# Patient Record
Sex: MALE | Race: BLACK OR AFRICAN AMERICAN | NOT HISPANIC OR LATINO | Employment: FULL TIME | URBAN - METROPOLITAN AREA
[De-identification: names, ages, dates, MRNs, and addresses within clinical notes are randomized per-mention and may not be internally consistent; named-entity substitution may affect disease eponyms.]

---

## 2019-03-03 ENCOUNTER — CLINICAL SUPPORT (OUTPATIENT)
Dept: URGENT CARE | Facility: CLINIC | Age: 34
End: 2019-03-03
Payer: COMMERCIAL

## 2019-03-03 VITALS
HEIGHT: 69 IN | RESPIRATION RATE: 16 BRPM | HEART RATE: 104 BPM | BODY MASS INDEX: 31.37 KG/M2 | DIASTOLIC BLOOD PRESSURE: 91 MMHG | SYSTOLIC BLOOD PRESSURE: 133 MMHG | TEMPERATURE: 99 F | WEIGHT: 211.81 LBS

## 2019-03-03 DIAGNOSIS — M54.50 LOW BACK PAIN, NON-SPECIFIC: ICD-10-CM
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
## 2019-03-03 DIAGNOSIS — M54.9 MUSCULOSKELETAL BACK PAIN: ICD-10-CM

## 2019-03-03 PROCEDURE — 72110 PR  X-RAY LUMBAR SPINE 4 VW: ICD-10-PCS | Mod: S$GLB,,, | Performed by: NURSE PRACTITIONER

## 2019-03-03 PROCEDURE — 72110 X-RAY EXAM L-2 SPINE 4/>VWS: CPT | Mod: S$GLB,,, | Performed by: NURSE PRACTITIONER

## 2019-03-03 PROCEDURE — 96372 PR INJECTION,THERAP/PROPH/DIAG2ST, IM OR SUBCUT: ICD-10-PCS | Mod: S$GLB,,, | Performed by: NURSE PRACTITIONER

## 2019-03-03 PROCEDURE — 99204 OFFICE O/P NEW MOD 45 MIN: CPT | Mod: 25,S$GLB,, | Performed by: NURSE PRACTITIONER

## 2019-03-03 PROCEDURE — 96372 THER/PROPH/DIAG INJ SC/IM: CPT | Mod: S$GLB,,, | Performed by: NURSE PRACTITIONER

## 2019-03-03 PROCEDURE — 99204 PR OFFICE/OUTPT VISIT, NEW, LEVL IV, 45-59 MIN: ICD-10-PCS | Mod: 25,S$GLB,, | Performed by: NURSE PRACTITIONER

## 2019-03-03 RX ORDER — DEXAMETHASONE SODIUM PHOSPHATE 4 MG/ML
8 INJECTION, SOLUTION INTRA-ARTICULAR; INTRALESIONAL; INTRAMUSCULAR; INTRAVENOUS; SOFT TISSUE
Status: COMPLETED | OUTPATIENT
Start: 2019-03-03 | End: 2019-03-03

## 2019-03-03 RX ORDER — METHOCARBAMOL 750 MG/1
TABLET, FILM COATED ORAL
Qty: 30 TABLET | Refills: 0 | Status: SHIPPED | OUTPATIENT
Start: 2019-03-03 | End: 2020-02-13

## 2019-03-03 RX ORDER — KETOROLAC TROMETHAMINE 30 MG/ML
30 INJECTION, SOLUTION INTRAMUSCULAR; INTRAVENOUS
Status: COMPLETED | OUTPATIENT
Start: 2019-03-03 | End: 2019-03-03

## 2019-03-03 RX ORDER — DEXTROAMPHETAMINE SACCHARATE, AMPHETAMINE ASPARTATE, DEXTROAMPHETAMINE SULFATE AND AMPHETAMINE SULFATE 3.125; 3.125; 3.125; 3.125 MG/1; MG/1; MG/1; MG/1
12.5 TABLET ORAL DAILY
COMMUNITY
End: 2020-02-13

## 2019-03-03 RX ORDER — MELOXICAM 15 MG/1
15 TABLET ORAL DAILY
Qty: 10 TABLET | Refills: 0 | Status: SHIPPED | OUTPATIENT
Start: 2019-03-03 | End: 2019-03-13

## 2019-03-03 RX ADMIN — KETOROLAC TROMETHAMINE 30 MG: 30 INJECTION, SOLUTION INTRAMUSCULAR; INTRAVENOUS at 02:03

## 2019-03-03 RX ADMIN — DEXAMETHASONE SODIUM PHOSPHATE 8 MG: 4 INJECTION, SOLUTION INTRA-ARTICULAR; INTRALESIONAL; INTRAMUSCULAR; INTRAVENOUS; SOFT TISSUE at 02:03

## 2019-03-03 NOTE — LETTER
March 3, 2019      [facility]  [address]  [place] [address]  Phone: [phone]       Patient: Owen Gonzalez   YOB: 1985  Date of Visit: 03/03/2019    To Whom It May Concern:    Tiana Gonzalez  was at [facility] on 03/03/2019. Please excuse him from work on 03/04/2019. If you have any questions or concerns, or if I can be of further assistance, please do not hesitate to contact me.    Sincerely,    [facility]

## 2019-03-03 NOTE — PATIENT INSTRUCTIONS
Self-Care for Low Back Pain    Most people have low back pain now and then. In many cases, it isnt serious and self-care can help. Sometimes low back pain can be a sign of a bigger problem. Call your healthcare provider if your pain returns often or gets worse over time. For the long-term care of your back, get regular exercise, lose any excess weight and learn good posture.  Take a short rest  Lying down during the day may be beneficial for short periods of time if severe pain increases with sitting or standing. Long-term bed rest could be detrimental.  Reduce pain and swelling  Cold reduces swelling. Both cold and heat can reduce pain. Protect your skin by placing a towel between your body and the ice or heat source.  · For the first few days, apply an ice pack for 15 to 20 minutes .  · After the first few days, try heat for 15 minutes at a time to ease pain. Never sleep on a heating pad.  · Over-the-counter medicine can help control pain and swelling. Try aspirin or ibuprofen.  Exercise  Exercise can help your back heal. It also helps your back get stronger and more flexible, preventing any reinjury. Ask your healthcare provider about specific exercises for your back.  Use good posture to avoid reinjury  · When moving, bend at the hips and knees. Dont bend at the waist or twist around.  · When lifting, keep the object close to your body. Dont try to lift more than you can handle.  · When sitting, keep your lower back supported. Use a rolled-up towel as needed.  Seek immediate medical care if:  · Youre unable to stand or walk.  · You have a temperature over 100.4°F (38.0°C)  · You have frequent, painful, or bloody urination.  · You have severe abdominal pain.  · You have a sharp, stabbing pain.  · Your pain is constant.  · You have pain or numbness in your leg.  · You feel pain in a new area of your back.  · You notice that the pain isnt decreasing after more than a week.   Date Last Reviewed: 9/29/2015  ©  2798-2146 [employer]. [address] [place] [address]. All rights reserved. This information is not intended as a substitute for professional medical care. Always follow your healthcare professional's instructions.        Relieving Back Pain  Back pain is a common problem. You can strain back muscles by lifting too much weight or just by moving the wrong way. Back strain can be uncomfortable, even painful. And it can take weeks or months to improve. To help yourself feel better and prevent future back strains, try these tips.  Important Note: Do not give aspirin to children or teens without first discussing it with your healthcare provider.      ? Ice    Ice reduces muscle pain and swelling. It helps most during the first 24 to 48 hours after an injury.  · Wrap an ice pack or a bag of frozen peas in a thin towel. (Never place ice directly on your skin.)  · Place the ice where your back hurts the most.  · Dont ice for more than 20 minutes at a time.  · You can use ice several times a day.  ? Medicines  Over-the-counter pain relievers can include acetaminophen and anti-inflammatory medicines, which includes aspirin or ibuprofen. They can help ease discomfort. Some also reduce swelling.  · Tell your healthcare provider about any medicines you are already taking.  · Take medicines only as directed.  ? Heat  After the first 48 hours, heat can relax sore muscles and improve blood flow.  · Try a warm bath or shower. Or use a heating pad set on low. To prevent a burn, keep a cloth between you and the heating pad.  · Dont use a heating pad for more than 15 minutes at a time. Never sleep on a heating pad.  Date Last Reviewed: 9/1/2015  © 3246-4769 [employer]. [address] [place] [address]. All rights reserved. This information is not intended as a substitute for professional medical care. Always follow your healthcare professional's instructions.
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
## 2020-02-13 ENCOUNTER — OFFICE VISIT (OUTPATIENT)
Dept: FAMILY MEDICINE | Facility: CLINIC | Age: 35
End: 2020-02-13
Payer: COMMERCIAL

## 2020-02-13 VITALS
HEIGHT: 69 IN | BODY MASS INDEX: 33.03 KG/M2 | HEART RATE: 84 BPM | SYSTOLIC BLOOD PRESSURE: 118 MMHG | WEIGHT: 223 LBS | DIASTOLIC BLOOD PRESSURE: 62 MMHG

## 2020-02-13 DIAGNOSIS — F90.0 ATTENTION DEFICIT HYPERACTIVITY DISORDER (ADHD), PREDOMINANTLY INATTENTIVE TYPE: ICD-10-CM

## 2020-02-13 DIAGNOSIS — Z00.00 ROUTINE PHYSICAL EXAMINATION: Primary | ICD-10-CM

## 2020-02-13 PROCEDURE — 99395 PR PREVENTIVE VISIT,EST,18-39: ICD-10-PCS | Mod: S$GLB,,, | Performed by: PHYSICIAN ASSISTANT

## 2020-02-13 PROCEDURE — 99395 PREV VISIT EST AGE 18-39: CPT | Mod: S$GLB,,, | Performed by: PHYSICIAN ASSISTANT

## 2020-02-13 RX ORDER — DEXTROAMPHETAMINE SACCHARATE, AMPHETAMINE ASPARTATE MONOHYDRATE, DEXTROAMPHETAMINE SULFATE AND AMPHETAMINE SULFATE 7.5; 7.5; 7.5; 7.5 MG/1; MG/1; MG/1; MG/1
30 CAPSULE, EXTENDED RELEASE ORAL EVERY MORNING
Qty: 30 CAPSULE | Refills: 0 | Status: SHIPPED | OUTPATIENT
Start: 2020-02-13 | End: 2020-03-13 | Stop reason: SDUPTHER

## 2020-02-13 NOTE — PROGRESS NOTES
SUBJECTIVE:    Patient ID: Owen Gonzalez is a 34 y.o. male.    Chief Complaint: Annual Exam (Wellness Visit, no meds// SW)    34-year-old black male presents today for annual wellness exam.  He reports that he has been doing pretty well. Does report some fatigue that seems to be worsening. Work as an  and busy with his hands. Has previously been on adderall for ADHD and reports that he was doing great on 30mg and 10 afternoon. Thinks that some of his anxiety and mood is related to not being as productive at work as he had been.      No visits with results within 6 Month(s) from this visit.   Latest known visit with results is:   Office Visit on 07/14/2016   Component Date Value Ref Range Status    Testosterone, Free 07/14/2016 9.4  5.1 - 41.5 pg/mL Final    Testosterone, Free 07/14/2016 9.4  5.1 - 41.5 pg/mL Final    TSH 07/14/2016 1.722  0.400 - 4.000 uIU/mL Final       History reviewed. No pertinent past medical history.  Past Surgical History:   Procedure Laterality Date    ANKLE FRACTURE SURGERY Left 2008    left ankle surgery 2008       Family History   Problem Relation Age of Onset    Diabetes Maternal Grandfather        Marital Status: Single  Alcohol History:  reports that he drinks alcohol.  Tobacco History:  reports that he has quit smoking. He quit smokeless tobacco use about 4 years ago.  Drug History:  reports that he does not use drugs.    Review of patient's allergies indicates:  No Known Allergies    Current Outpatient Medications:     dextroamphetamine-amphetamine (ADDERALL XR) 30 MG 24 hr capsule, Take 1 capsule (30 mg total) by mouth every morning., Disp: 30 capsule, Rfl: 0    Review of Systems   Constitutional: Negative for activity change, fatigue, fever and unexpected weight change.   HENT: Negative for congestion.    Respiratory: Negative for apnea, cough, chest tightness and shortness of breath.    Cardiovascular: Negative for chest pain and palpitations.  "  Gastrointestinal: Negative for abdominal distention and abdominal pain.   Genitourinary: Negative for difficulty urinating and dysuria.   Musculoskeletal: Negative for arthralgias and back pain.   Neurological: Negative for dizziness and weakness.   Psychiatric/Behavioral: Positive for decreased concentration. Negative for agitation, dysphoric mood and suicidal ideas. The patient is nervous/anxious.           Objective:      Vitals:    02/13/20 1025   BP: 118/62   Pulse: 84   Weight: 101.2 kg (223 lb)   Height: 5' 8.5" (1.74 m)     Physical Exam   Constitutional: He is oriented to person, place, and time. He appears well-developed and well-nourished. No distress.   HENT:   Head: Normocephalic and atraumatic.   Eyes: Pupils are equal, round, and reactive to light.   Neck: Normal range of motion. Neck supple. No thyromegaly present.   Cardiovascular: Normal rate, regular rhythm, normal heart sounds and intact distal pulses.   Pulmonary/Chest: Effort normal and breath sounds normal.   Abdominal: Soft. Bowel sounds are normal. He exhibits no distension. There is no tenderness.   Musculoskeletal: Normal range of motion.   Neurological: He is alert and oriented to person, place, and time. No cranial nerve deficit.   Skin: Skin is warm and dry. No rash noted. No erythema.         Assessment:       1. Routine physical examination    2. Attention deficit hyperactivity disorder (ADHD), predominantly inattentive type         Plan:       Routine physical examination  Comments:  Going to get baseline labs on patient today for ongoing patient care    Attention deficit hyperactivity disorder (ADHD), predominantly inattentive type  Comments:  I suspect that some of his anxiety is related to under treated ADHD.  Plan to start him back on Adderall 30 mg daily and see him in 30 days  Orders:  -     dextroamphetamine-amphetamine (ADDERALL XR) 30 MG 24 hr capsule; Take 1 capsule (30 mg total) by mouth every morning.  Dispense: 30 " capsule; Refill: 0  -     CBC auto differential; Future; Expected date: 02/13/2020  -     Comprehensive metabolic panel; Future; Expected date: 02/13/2020  -     Lipid panel; Future; Expected date: 02/13/2020  -     TSH w/reflex to FT4; Future; Expected date: 02/13/2020  -     Urinalysis, Reflex to Urine Culture Urine, Clean Catch; Future; Expected date: 02/13/2020      Follow up in about 4 weeks (around 3/12/2020) for adhd checkup.        2/13/2020 Johnathan Gill PA-C

## 2020-02-13 NOTE — PATIENT INSTRUCTIONS
Treating Attention Deficit/Hyperactivity Disorder (ADHD, ADD) in Adults  Attention deficit hyperactivity disorder (ADHD) starts in childhood. It may continue throughout your life. When it does, it may affect your job and even your relationships. Fortunately, with help, you can manage ADHD.     Treatment for ADD can help you achieve your goals.   Therapy  Your therapist can help you learn healthy ways to cope with ADHD. Sometimes, your partner or family may attend your sessions with you. This helps them understand more about your disorder.  Coaching  An ADHD  works with you to achieve your goals. Youll learn the best ways to manage your time. Youll also learn to avoid clutter and noise that may distract you. In time, your life will have more order and structure. And your  will provide support and feedback on your progress.  Work  Look for jobs where you can be free and creative. Avoid those that are dull or centered on details. You may still need to make a special effort. The following tips may help:  · Try to work at home, at least part-time.  · Ask for a private office.  · Use headphones to muffle noise.  · Work on more than one project at the same time. When you get bored with one, switch to the other.  · Work on boring tasks when you feel most alert.  · Have a schedule for each day.  · Ask your  or  to help with details.  · Use a day planner and to-do lists. Write yourself notes.  · Reward yourself when you finish a task.  Medicines  In some cases, medicines can help control symptoms of ADHD. Your healthcare provider may prescribe a stimulant to help you stay focused. Or you may take a type of antidepressant. It may take some time to find what works best for you. Keep in mind that medicines dont cure ADHD. And they may cause side effects such as headaches, trouble sleeping, or stomach problems. If youre bothered by side effects, be sure to tell your healthcare provider.  Changing the dose or type of medicine may help. Most often, youll use medicine along with therapy, coaching, and lifestyle changes.  Date Last Reviewed: 1/1/2017  © 9529-3884 [employer]. [address] [place] [address], [place] [address]. All rights reserved. This information is not intended as a substitute for professional medical care. Always follow your healthcare professional's instructions.

## 2020-02-15 LAB
ALBUMIN SERPL-MCNC: 4.6 G/DL (ref 3.6–5.1)
ALBUMIN/GLOB SERPL: 1.4 (CALC) (ref 1–2.5)
ALP SERPL-CCNC: 58 U/L (ref 36–130)
ALT SERPL-CCNC: 53 U/L (ref 9–46)
APPEARANCE UR: CLEAR
AST SERPL-CCNC: 68 U/L (ref 10–40)
BACTERIA #/AREA URNS HPF: ABNORMAL /HPF
BACTERIA UR CULT: ABNORMAL
BASOPHILS # BLD AUTO: 42 CELLS/UL (ref 0–200)
BASOPHILS NFR BLD AUTO: 0.6 %
BILIRUB SERPL-MCNC: 0.6 MG/DL (ref 0.2–1.2)
BILIRUB UR QL STRIP: NEGATIVE
BUN SERPL-MCNC: 17 MG/DL (ref 7–25)
BUN/CREAT SERPL: ABNORMAL (CALC) (ref 6–22)
CALCIUM SERPL-MCNC: 9.5 MG/DL (ref 8.6–10.3)
CHLORIDE SERPL-SCNC: 103 MMOL/L (ref 98–110)
CHOLEST SERPL-MCNC: 213 MG/DL
CHOLEST/HDLC SERPL: 3.3 (CALC)
CO2 SERPL-SCNC: 28 MMOL/L (ref 20–32)
COLOR UR: YELLOW
CREAT SERPL-MCNC: 0.95 MG/DL (ref 0.6–1.35)
EOSINOPHIL # BLD AUTO: 119 CELLS/UL (ref 15–500)
EOSINOPHIL NFR BLD AUTO: 1.7 %
ERYTHROCYTE [DISTWIDTH] IN BLOOD BY AUTOMATED COUNT: 11.9 % (ref 11–15)
GFRSERPLBLD MDRD-ARVRAT: 105 ML/MIN/1.73M2
GLOBULIN SER CALC-MCNC: 3.3 G/DL (CALC) (ref 1.9–3.7)
GLUCOSE SERPL-MCNC: 97 MG/DL (ref 65–99)
GLUCOSE UR QL STRIP: NEGATIVE
HCT VFR BLD AUTO: 47.5 % (ref 38.5–50)
HDLC SERPL-MCNC: 64 MG/DL
HGB BLD-MCNC: 16.2 G/DL (ref 13.2–17.1)
HGB UR QL STRIP: NEGATIVE
HYALINE CASTS #/AREA URNS LPF: ABNORMAL /LPF
KETONES UR QL STRIP: NEGATIVE
LDLC SERPL CALC-MCNC: 134 MG/DL (CALC)
LEUKOCYTE ESTERASE UR QL STRIP: NEGATIVE
LYMPHOCYTES # BLD AUTO: 1897 CELLS/UL (ref 850–3900)
LYMPHOCYTES NFR BLD AUTO: 27.1 %
MCH RBC QN AUTO: 31.9 PG (ref 27–33)
MCHC RBC AUTO-ENTMCNC: 34.1 G/DL (ref 32–36)
MCV RBC AUTO: 93.5 FL (ref 80–100)
MONOCYTES # BLD AUTO: 560 CELLS/UL (ref 200–950)
MONOCYTES NFR BLD AUTO: 8 %
NEUTROPHILS # BLD AUTO: 4382 CELLS/UL (ref 1500–7800)
NEUTROPHILS NFR BLD AUTO: 62.6 %
NITRITE UR QL STRIP: NEGATIVE
NONHDLC SERPL-MCNC: 149 MG/DL (CALC)
PH UR STRIP: 6.5 [PH] (ref 5–8)
PLATELET # BLD AUTO: 224 THOUSAND/UL (ref 140–400)
PMV BLD REES-ECKER: 10.6 FL (ref 7.5–12.5)
POTASSIUM SERPL-SCNC: 4.6 MMOL/L (ref 3.5–5.3)
PROT SERPL-MCNC: 7.9 G/DL (ref 6.1–8.1)
PROT UR QL STRIP: NEGATIVE
RBC # BLD AUTO: 5.08 MILLION/UL (ref 4.2–5.8)
RBC #/AREA URNS HPF: ABNORMAL /HPF
SODIUM SERPL-SCNC: 139 MMOL/L (ref 135–146)
SP GR UR STRIP: 1.02 (ref 1–1.03)
SQUAMOUS #/AREA URNS HPF: ABNORMAL /HPF
TRIGL SERPL-MCNC: 55 MG/DL
TSH SERPL-ACNC: 1.07 MIU/L (ref 0.4–4.5)
WBC # BLD AUTO: 7 THOUSAND/UL (ref 3.8–10.8)
WBC #/AREA URNS HPF: ABNORMAL /HPF

## 2020-03-13 ENCOUNTER — OFFICE VISIT (OUTPATIENT)
Dept: FAMILY MEDICINE | Facility: CLINIC | Age: 35
End: 2020-03-13
Payer: COMMERCIAL

## 2020-03-13 VITALS
SYSTOLIC BLOOD PRESSURE: 114 MMHG | HEART RATE: 76 BPM | BODY MASS INDEX: 32.88 KG/M2 | WEIGHT: 222 LBS | DIASTOLIC BLOOD PRESSURE: 76 MMHG | HEIGHT: 69 IN

## 2020-03-13 DIAGNOSIS — F90.0 ATTENTION DEFICIT HYPERACTIVITY DISORDER (ADHD), PREDOMINANTLY INATTENTIVE TYPE: ICD-10-CM

## 2020-03-13 PROCEDURE — 3008F PR BODY MASS INDEX (BMI) DOCUMENTED: ICD-10-PCS | Mod: S$GLB,,, | Performed by: PHYSICIAN ASSISTANT

## 2020-03-13 PROCEDURE — 3008F BODY MASS INDEX DOCD: CPT | Mod: S$GLB,,, | Performed by: PHYSICIAN ASSISTANT

## 2020-03-13 PROCEDURE — 99213 OFFICE O/P EST LOW 20 MIN: CPT | Mod: S$GLB,,, | Performed by: PHYSICIAN ASSISTANT

## 2020-03-13 PROCEDURE — 99213 PR OFFICE/OUTPT VISIT, EST, LEVL III, 20-29 MIN: ICD-10-PCS | Mod: S$GLB,,, | Performed by: PHYSICIAN ASSISTANT

## 2020-03-13 RX ORDER — DEXTROAMPHETAMINE SACCHARATE, AMPHETAMINE ASPARTATE, DEXTROAMPHETAMINE SULFATE AND AMPHETAMINE SULFATE 2.5; 2.5; 2.5; 2.5 MG/1; MG/1; MG/1; MG/1
10 TABLET ORAL DAILY
Qty: 30 TABLET | Refills: 0 | Status: SHIPPED | OUTPATIENT
Start: 2020-03-13 | End: 2020-04-12

## 2020-03-13 RX ORDER — DEXTROAMPHETAMINE SACCHARATE, AMPHETAMINE ASPARTATE, DEXTROAMPHETAMINE SULFATE AND AMPHETAMINE SULFATE 2.5; 2.5; 2.5; 2.5 MG/1; MG/1; MG/1; MG/1
10 TABLET ORAL DAILY
Qty: 30 TABLET | Refills: 0 | Status: SHIPPED | OUTPATIENT
Start: 2020-05-13 | End: 2020-04-16 | Stop reason: SDUPTHER

## 2020-03-13 RX ORDER — DEXTROAMPHETAMINE SACCHARATE, AMPHETAMINE ASPARTATE, DEXTROAMPHETAMINE SULFATE AND AMPHETAMINE SULFATE 2.5; 2.5; 2.5; 2.5 MG/1; MG/1; MG/1; MG/1
10 TABLET ORAL DAILY
Qty: 30 TABLET | Refills: 0 | Status: SHIPPED | OUTPATIENT
Start: 2020-04-13 | End: 2020-05-13

## 2020-03-13 RX ORDER — DEXTROAMPHETAMINE SACCHARATE, AMPHETAMINE ASPARTATE MONOHYDRATE, DEXTROAMPHETAMINE SULFATE AND AMPHETAMINE SULFATE 7.5; 7.5; 7.5; 7.5 MG/1; MG/1; MG/1; MG/1
30 CAPSULE, EXTENDED RELEASE ORAL EVERY MORNING
Qty: 30 CAPSULE | Refills: 0 | Status: SHIPPED | OUTPATIENT
Start: 2020-03-13 | End: 2020-04-12

## 2020-03-13 RX ORDER — DEXTROAMPHETAMINE SACCHARATE, AMPHETAMINE ASPARTATE MONOHYDRATE, DEXTROAMPHETAMINE SULFATE AND AMPHETAMINE SULFATE 7.5; 7.5; 7.5; 7.5 MG/1; MG/1; MG/1; MG/1
30 CAPSULE, EXTENDED RELEASE ORAL EVERY MORNING
Qty: 30 CAPSULE | Refills: 0 | Status: SHIPPED | OUTPATIENT
Start: 2020-05-13 | End: 2020-05-26 | Stop reason: SDUPTHER

## 2020-03-13 RX ORDER — DEXTROAMPHETAMINE SACCHARATE, AMPHETAMINE ASPARTATE MONOHYDRATE, DEXTROAMPHETAMINE SULFATE AND AMPHETAMINE SULFATE 7.5; 7.5; 7.5; 7.5 MG/1; MG/1; MG/1; MG/1
30 CAPSULE, EXTENDED RELEASE ORAL EVERY MORNING
Qty: 30 CAPSULE | Refills: 0 | Status: SHIPPED | OUTPATIENT
Start: 2020-04-13 | End: 2020-05-13

## 2020-03-13 NOTE — PROGRESS NOTES
SUBJECTIVE:    Patient ID: Owen Gonzalez is a 34 y.o. male.    Chief Complaint: Follow-up (ADHD, no bottles// SW)    34-year-old black male presents for 4 week follow-up.  I started him on Adderall XR at the last visit.  He had previously been treated in the past and did well on medication.  New job as an  and requiring lots of focus and prioritizing.  He reports that he has done well with the 30 XR.  Very minimal side effects to note. Thinks that he does notice it wearing off mid afternoon.      Office Visit on 02/13/2020   Component Date Value Ref Range Status    WBC 02/14/2020 7.0  3.8 - 10.8 Thousand/uL Final    RBC 02/14/2020 5.08  4.20 - 5.80 Million/uL Final    Hemoglobin 02/14/2020 16.2  13.2 - 17.1 g/dL Final    Hematocrit 02/14/2020 47.5  38.5 - 50.0 % Final    Mean Corpuscular Volume 02/14/2020 93.5  80.0 - 100.0 fL Final    Mean Corpuscular Hemoglobin 02/14/2020 31.9  27.0 - 33.0 pg Final    Mean Corpuscular Hemoglobin Conc 02/14/2020 34.1  32.0 - 36.0 g/dL Final    RDW 02/14/2020 11.9  11.0 - 15.0 % Final    Platelets 02/14/2020 224  140 - 400 Thousand/uL Final    MPV 02/14/2020 10.6  7.5 - 12.5 fL Final    Neutrophils Absolute 02/14/2020 4,382  1,500 - 7,800 cells/uL Final    Lymph # 02/14/2020 1,897  850 - 3,900 cells/uL Final    Mono # 02/14/2020 560  200 - 950 cells/uL Final    Eos # 02/14/2020 119  15 - 500 cells/uL Final    Baso # 02/14/2020 42  0 - 200 cells/uL Final    Neutrophils Relative 02/14/2020 62.6  % Final    Lymph% 02/14/2020 27.1  % Final    Mono% 02/14/2020 8.0  % Final    Eosinophil% 02/14/2020 1.7  % Final    Basophil% 02/14/2020 0.6  % Final    Glucose 02/14/2020 97  65 - 99 mg/dL Final    BUN, Bld 02/14/2020 17  7 - 25 mg/dL Final    Creatinine 02/14/2020 0.95  0.60 - 1.35 mg/dL Final    eGFR if non African American 02/14/2020 105  > OR = 60 mL/min/1.73m2 Final    eGFR if  02/14/2020 121  > OR = 60 mL/min/1.73m2 Final     BUN/Creatinine Ratio 02/14/2020 NOT APPLICABLE  6 - 22 (calc) Final    Sodium 02/14/2020 139  135 - 146 mmol/L Final    Potassium 02/14/2020 4.6  3.5 - 5.3 mmol/L Final    Chloride 02/14/2020 103  98 - 110 mmol/L Final    CO2 02/14/2020 28  20 - 32 mmol/L Final    Calcium 02/14/2020 9.5  8.6 - 10.3 mg/dL Final    Total Protein 02/14/2020 7.9  6.1 - 8.1 g/dL Final    Albumin 02/14/2020 4.6  3.6 - 5.1 g/dL Final    Globulin, Total 02/14/2020 3.3  1.9 - 3.7 g/dL (calc) Final    Albumin/Globulin Ratio 02/14/2020 1.4  1.0 - 2.5 (calc) Final    Total Bilirubin 02/14/2020 0.6  0.2 - 1.2 mg/dL Final    Alkaline Phosphatase 02/14/2020 58  36 - 130 U/L Final    AST 02/14/2020 68* 10 - 40 U/L Final    ALT 02/14/2020 53* 9 - 46 U/L Final    Cholesterol 02/14/2020 213* <200 mg/dL Final    HDL 02/14/2020 64  > OR = 40 mg/dL Final    Triglycerides 02/14/2020 55  <150 mg/dL Final    LDL Cholesterol 02/14/2020 134* mg/dL (calc) Final    Hdl/Cholesterol Ratio 02/14/2020 3.3  <5.0 (calc) Final    Non HDL Chol. (LDL+VLDL) 02/14/2020 149* <130 mg/dL (calc) Final    TSH w/reflex to FT4 02/14/2020 1.07  0.40 - 4.50 mIU/L Final    Color, UA 02/14/2020 YELLOW  YELLOW Final    Appearance, UA 02/14/2020 CLEAR  CLEAR Final    Specific Gravity, UA 02/14/2020 1.020  1.001 - 1.035 Final    pH, UA 02/14/2020 6.5  5.0 - 8.0 Final    Glucose, UA 02/14/2020 NEGATIVE  NEGATIVE Final    Bilirubin, UA 02/14/2020 NEGATIVE  NEGATIVE Final    Ketones, UA 02/14/2020 NEGATIVE  NEGATIVE Final    Occult Blood UA 02/14/2020 NEGATIVE  NEGATIVE Final    Protein, UA 02/14/2020 NEGATIVE  NEGATIVE Final    Nitrite, UA 02/14/2020 NEGATIVE  NEGATIVE Final    Leukocytes, UA 02/14/2020 NEGATIVE  NEGATIVE Final    WBC Casts, UA 02/14/2020 NONE SEEN  < OR = 5 /HPF Final    RBC Casts, UA 02/14/2020 NONE SEEN  < OR = 2 /HPF Final    Squam Epithel, UA 02/14/2020 NONE SEEN  < OR = 5 /HPF Final    Bacteria, UA 02/14/2020 NONE SEEN  NONE SEEN  /HPF Final    Hyaline Casts, UA 02/14/2020 0-5* NONE SEEN /LPF Final    Reflexive Urine Culture 02/14/2020 NO CULTURE INDICATED   Final       History reviewed. No pertinent past medical history.  Past Surgical History:   Procedure Laterality Date    ANKLE FRACTURE SURGERY Left 2008    left ankle surgery 2008       Family History   Problem Relation Age of Onset    Diabetes Maternal Grandfather        Marital Status: Single  Alcohol History:  reports that he drinks alcohol.  Tobacco History:  reports that he has quit smoking. He quit smokeless tobacco use about 4 years ago.  Drug History:  reports that he does not use drugs.    Review of patient's allergies indicates:  No Known Allergies    Current Outpatient Medications:     dextroamphetamine-amphetamine (ADDERALL XR) 30 MG 24 hr capsule, Take 1 capsule (30 mg total) by mouth every morning., Disp: 30 capsule, Rfl: 0    [START ON 4/13/2020] dextroamphetamine-amphetamine (ADDERALL XR) 30 MG 24 hr capsule, Take 1 capsule (30 mg total) by mouth every morning., Disp: 30 capsule, Rfl: 0    [START ON 5/13/2020] dextroamphetamine-amphetamine (ADDERALL XR) 30 MG 24 hr capsule, Take 1 capsule (30 mg total) by mouth every morning., Disp: 30 capsule, Rfl: 0    dextroamphetamine-amphetamine (ADDERALL) 10 mg Tab, Take 1 tablet (10 mg total) by mouth once daily., Disp: 30 tablet, Rfl: 0    [START ON 4/13/2020] dextroamphetamine-amphetamine (ADDERALL) 10 mg Tab, Take 1 tablet (10 mg total) by mouth once daily., Disp: 30 tablet, Rfl: 0    [START ON 5/13/2020] dextroamphetamine-amphetamine (ADDERALL) 10 mg Tab, Take 1 tablet (10 mg total) by mouth once daily., Disp: 30 tablet, Rfl: 0    Review of Systems   Constitutional: Negative for activity change, fatigue, fever and unexpected weight change.   HENT: Negative for congestion.    Respiratory: Negative for apnea, cough, chest tightness and shortness of breath.    Cardiovascular: Negative for chest pain and palpitations.  "  Gastrointestinal: Negative for abdominal distention and abdominal pain.   Genitourinary: Negative for difficulty urinating and dysuria.   Musculoskeletal: Negative for arthralgias and back pain.   Neurological: Negative for dizziness and weakness.          Objective:      Vitals:    03/13/20 0739   BP: 114/76   Pulse: 76   Weight: 100.7 kg (222 lb)   Height: 5' 8.5" (1.74 m)     Physical Exam   Constitutional: He is oriented to person, place, and time. He appears well-developed and well-nourished. No distress.   HENT:   Head: Normocephalic and atraumatic.   Eyes: Pupils are equal, round, and reactive to light.   Neck: Normal range of motion. Neck supple. No thyromegaly present.   Cardiovascular: Normal rate, regular rhythm, normal heart sounds and intact distal pulses.   Pulmonary/Chest: Effort normal and breath sounds normal.   Abdominal: Soft. Bowel sounds are normal. He exhibits no distension. There is no tenderness.   Musculoskeletal: Normal range of motion.   Neurological: He is alert and oriented to person, place, and time. No cranial nerve deficit.   Skin: Skin is warm and dry. No rash noted. No erythema.         Assessment:       1. Attention deficit hyperactivity disorder (ADHD), predominantly inattentive type         Plan:       Attention deficit hyperactivity disorder (ADHD), predominantly inattentive type  Comments:  Going to add 10mg IR in the afternoon prn on days where he has alot going on. Reevaluate in 3 mos.  Orders:  -     dextroamphetamine-amphetamine (ADDERALL XR) 30 MG 24 hr capsule; Take 1 capsule (30 mg total) by mouth every morning.  Dispense: 30 capsule; Refill: 0  -     dextroamphetamine-amphetamine (ADDERALL XR) 30 MG 24 hr capsule; Take 1 capsule (30 mg total) by mouth every morning.  Dispense: 30 capsule; Refill: 0  -     dextroamphetamine-amphetamine (ADDERALL XR) 30 MG 24 hr capsule; Take 1 capsule (30 mg total) by mouth every morning.  Dispense: 30 capsule; Refill: 0  -     " dextroamphetamine-amphetamine (ADDERALL) 10 mg Tab; Take 1 tablet (10 mg total) by mouth once daily.  Dispense: 30 tablet; Refill: 0  -     dextroamphetamine-amphetamine (ADDERALL) 10 mg Tab; Take 1 tablet (10 mg total) by mouth once daily.  Dispense: 30 tablet; Refill: 0  -     dextroamphetamine-amphetamine (ADDERALL) 10 mg Tab; Take 1 tablet (10 mg total) by mouth once daily.  Dispense: 30 tablet; Refill: 0      Follow up in about 3 months (around 6/13/2020).        3/13/2020 Johnathan Gill PA-C

## 2020-03-13 NOTE — PATIENT INSTRUCTIONS
Treating Attention Deficit/Hyperactivity Disorder (ADHD, ADD) in Adults  Attention deficit hyperactivity disorder (ADHD) starts in childhood. It may continue throughout your life. When it does, it may affect your job and even your relationships. Fortunately, with help, you can manage ADHD.     Treatment for ADD can help you achieve your goals.   Therapy  Your therapist can help you learn healthy ways to cope with ADHD. Sometimes, your partner or family may attend your sessions with you. This helps them understand more about your disorder.  Coaching  An ADHD  works with you to achieve your goals. Youll learn the best ways to manage your time. Youll also learn to avoid clutter and noise that may distract you. In time, your life will have more order and structure. And your  will provide support and feedback on your progress.  Work  Look for jobs where you can be free and creative. Avoid those that are dull or centered on details. You may still need to make a special effort. The following tips may help:  · Try to work at home, at least part-time.  · Ask for a private office.  · Use headphones to muffle noise.  · Work on more than one project at the same time. When you get bored with one, switch to the other.  · Work on boring tasks when you feel most alert.  · Have a schedule for each day.  · Ask your  or  to help with details.  · Use a day planner and to-do lists. Write yourself notes.  · Reward yourself when you finish a task.  Medicines  In some cases, medicines can help control symptoms of ADHD. Your healthcare provider may prescribe a stimulant to help you stay focused. Or you may take a type of antidepressant. It may take some time to find what works best for you. Keep in mind that medicines dont cure ADHD. And they may cause side effects such as headaches, trouble sleeping, or stomach problems. If youre bothered by side effects, be sure to tell your healthcare provider.  Changing the dose or type of medicine may help. Most often, youll use medicine along with therapy, coaching, and lifestyle changes.  Date Last Reviewed: 1/1/2017  © 9792-3865 [employer]. [address] [place] [address], [place] [address]. All rights reserved. This information is not intended as a substitute for professional medical care. Always follow your healthcare professional's instructions.

## 2020-03-23 ENCOUNTER — NURSE TRIAGE (OUTPATIENT)
Dept: ADMINISTRATIVE | Facility: CLINIC | Age: 35
End: 2020-03-23

## 2020-03-23 ENCOUNTER — HOSPITAL ENCOUNTER (EMERGENCY)
Facility: HOSPITAL | Age: 35
Discharge: HOME | End: 2020-03-23
Attending: EMERGENCY MEDICINE
Payer: COMMERCIAL

## 2020-03-23 VITALS
DIASTOLIC BLOOD PRESSURE: 68 MMHG | BODY MASS INDEX: 30.81 KG/M2 | RESPIRATION RATE: 25 BRPM | TEMPERATURE: 99 F | SYSTOLIC BLOOD PRESSURE: 114 MMHG | HEIGHT: 69 IN | OXYGEN SATURATION: 94 % | HEART RATE: 86 BPM | WEIGHT: 208 LBS

## 2020-03-23 DIAGNOSIS — J18.9 PNEUMONIA OF LEFT LOWER LOBE DUE TO INFECTIOUS ORGANISM: ICD-10-CM

## 2020-03-23 DIAGNOSIS — R50.9 COUGH WITH FEVER: ICD-10-CM

## 2020-03-23 DIAGNOSIS — R05.9 COUGH WITH FEVER: ICD-10-CM

## 2020-03-23 DIAGNOSIS — J40 BRONCHITIS: Primary | ICD-10-CM

## 2020-03-23 LAB
ALBUMIN SERPL BCP-MCNC: 4.3 G/DL (ref 3.5–5.2)
ALP SERPL-CCNC: 59 U/L (ref 55–135)
ALT SERPL W/O P-5'-P-CCNC: 29 U/L (ref 10–44)
ANION GAP SERPL CALC-SCNC: 9 MMOL/L (ref 8–16)
AST SERPL-CCNC: 30 U/L (ref 10–40)
BASOPHILS # BLD AUTO: 0.02 K/UL (ref 0–0.2)
BASOPHILS NFR BLD: 0.4 % (ref 0–1.9)
BILIRUB SERPL-MCNC: 1.2 MG/DL (ref 0.1–1)
BUN SERPL-MCNC: 18 MG/DL (ref 6–20)
CALCIUM SERPL-MCNC: 8.8 MG/DL (ref 8.7–10.5)
CHLORIDE SERPL-SCNC: 97 MMOL/L (ref 95–110)
CO2 SERPL-SCNC: 27 MMOL/L (ref 23–29)
CREAT SERPL-MCNC: 1.1 MG/DL (ref 0.5–1.4)
DIFFERENTIAL METHOD: ABNORMAL
EOSINOPHIL # BLD AUTO: 0 K/UL (ref 0–0.5)
EOSINOPHIL NFR BLD: 0 % (ref 0–8)
ERYTHROCYTE [DISTWIDTH] IN BLOOD BY AUTOMATED COUNT: 11.5 % (ref 11.5–14.5)
EST. GFR  (AFRICAN AMERICAN): >60 ML/MIN/1.73 M^2
EST. GFR  (NON AFRICAN AMERICAN): >60 ML/MIN/1.73 M^2
GLUCOSE SERPL-MCNC: 91 MG/DL (ref 70–110)
HCT VFR BLD AUTO: 46.9 % (ref 40–54)
HGB BLD-MCNC: 15.5 G/DL (ref 14–18)
IMM GRANULOCYTES # BLD AUTO: 0.04 K/UL (ref 0–0.04)
IMM GRANULOCYTES NFR BLD AUTO: 0.7 % (ref 0–0.5)
INFLUENZA A, MOLECULAR: NEGATIVE
INFLUENZA B, MOLECULAR: NEGATIVE
LACTATE SERPL-SCNC: 1 MMOL/L (ref 0.5–1.9)
LYMPHOCYTES # BLD AUTO: 1.4 K/UL (ref 1–4.8)
LYMPHOCYTES NFR BLD: 26.5 % (ref 18–48)
MCH RBC QN AUTO: 31.4 PG (ref 27–31)
MCHC RBC AUTO-ENTMCNC: 33 G/DL (ref 32–36)
MCV RBC AUTO: 95 FL (ref 82–98)
MONOCYTES # BLD AUTO: 0.4 K/UL (ref 0.3–1)
MONOCYTES NFR BLD: 6.8 % (ref 4–15)
NEUTROPHILS # BLD AUTO: 3.6 K/UL (ref 1.8–7.7)
NEUTROPHILS NFR BLD: 65.6 % (ref 38–73)
NRBC BLD-RTO: 0 /100 WBC
PLATELET # BLD AUTO: 130 K/UL (ref 150–350)
PMV BLD AUTO: 10.2 FL (ref 9.2–12.9)
POTASSIUM SERPL-SCNC: 4.1 MMOL/L (ref 3.5–5.1)
PROCALCITONIN SERPL IA-MCNC: <0.05 NG/ML (ref 0–0.5)
PROT SERPL-MCNC: 8.3 G/DL (ref 6–8.4)
RBC # BLD AUTO: 4.94 M/UL (ref 4.6–6.2)
SODIUM SERPL-SCNC: 133 MMOL/L (ref 136–145)
SPECIMEN SOURCE: NORMAL
WBC # BLD AUTO: 5.43 K/UL (ref 3.9–12.7)

## 2020-03-23 PROCEDURE — U0002 COVID-19 LAB TEST NON-CDC: HCPCS

## 2020-03-23 PROCEDURE — 96366 THER/PROPH/DIAG IV INF ADDON: CPT

## 2020-03-23 PROCEDURE — 25000003 PHARM REV CODE 250: Performed by: NURSE PRACTITIONER

## 2020-03-23 PROCEDURE — 83605 ASSAY OF LACTIC ACID: CPT

## 2020-03-23 PROCEDURE — 85025 COMPLETE CBC W/AUTO DIFF WBC: CPT

## 2020-03-23 PROCEDURE — 87502 INFLUENZA DNA AMP PROBE: CPT

## 2020-03-23 PROCEDURE — 96365 THER/PROPH/DIAG IV INF INIT: CPT

## 2020-03-23 PROCEDURE — 63600175 PHARM REV CODE 636 W HCPCS: Performed by: NURSE PRACTITIONER

## 2020-03-23 PROCEDURE — 87040 BLOOD CULTURE FOR BACTERIA: CPT | Mod: 59

## 2020-03-23 PROCEDURE — 99284 EMERGENCY DEPT VISIT MOD MDM: CPT | Mod: 25

## 2020-03-23 PROCEDURE — 80053 COMPREHEN METABOLIC PANEL: CPT

## 2020-03-23 PROCEDURE — 87081 CULTURE SCREEN ONLY: CPT

## 2020-03-23 PROCEDURE — 84145 PROCALCITONIN (PCT): CPT

## 2020-03-23 RX ORDER — ACETAMINOPHEN 325 MG/1
650 TABLET ORAL
Status: COMPLETED | OUTPATIENT
Start: 2020-03-23 | End: 2020-03-23

## 2020-03-23 RX ORDER — ALBUTEROL SULFATE 90 UG/1
1-2 AEROSOL, METERED RESPIRATORY (INHALATION) EVERY 6 HOURS PRN
Qty: 1 G | Refills: 0 | Status: SHIPPED | OUTPATIENT
Start: 2020-03-23 | End: 2020-09-02

## 2020-03-23 RX ORDER — AZITHROMYCIN 250 MG/1
250 TABLET, FILM COATED ORAL DAILY
Qty: 6 TABLET | Refills: 0 | Status: SHIPPED | OUTPATIENT
Start: 2020-03-23 | End: 2020-06-19

## 2020-03-23 RX ORDER — AZITHROMYCIN 250 MG/1
250 TABLET, FILM COATED ORAL DAILY
Qty: 6 TABLET | Refills: 0 | Status: SHIPPED | OUTPATIENT
Start: 2020-03-23 | End: 2020-03-23 | Stop reason: SDUPTHER

## 2020-03-23 RX ORDER — ALBUTEROL SULFATE 90 UG/1
1-2 AEROSOL, METERED RESPIRATORY (INHALATION) EVERY 6 HOURS PRN
Qty: 1 G | Refills: 0 | Status: SHIPPED | OUTPATIENT
Start: 2020-03-23 | End: 2020-03-23 | Stop reason: SDUPTHER

## 2020-03-23 RX ORDER — BENZONATATE 100 MG/1
100 CAPSULE ORAL 3 TIMES DAILY PRN
Qty: 30 CAPSULE | Refills: 0 | Status: SHIPPED | OUTPATIENT
Start: 2020-03-23 | End: 2020-06-19

## 2020-03-23 RX ORDER — BENZONATATE 100 MG/1
100 CAPSULE ORAL 3 TIMES DAILY PRN
Qty: 30 CAPSULE | Refills: 0 | Status: SHIPPED | OUTPATIENT
Start: 2020-03-23 | End: 2020-03-23 | Stop reason: SDUPTHER

## 2020-03-23 RX ADMIN — CEFTRIAXONE 1 G: 1 INJECTION, SOLUTION INTRAVENOUS at 06:03

## 2020-03-23 RX ADMIN — SODIUM CHLORIDE 1000 ML: 0.9 INJECTION, SOLUTION INTRAVENOUS at 06:03

## 2020-03-23 RX ADMIN — ACETAMINOPHEN 650 MG: 325 TABLET ORAL at 03:03

## 2020-03-23 NOTE — DISCHARGE INSTRUCTIONS
At this time you must quarantine at home until you have   3 days without fever  2.   Must have not taken any fever reducing medications   3.  Other symptoms such as cough should be improving.  4.  Must have been at least 7 days since first symptom.    At this time it is important to self quarantine at home and to call the [facility] at [phone] or text JAQUELINED to [phone] for further advice on when quarantine may be lifted.  You may talk to a doctor by virtual visit by going to [email or web address] or [email or web address] or call your primary care doctor for further advice.  You should return to the ER immediately if you have difficulty breathing, have any worsening symptoms or any concerns. You may call [phone] for 24/7 Covid-19 information.    Places for Testing         [facility] (for [facility] and [facility] patients only)                   [facility] [address] [place] [address]                             [facility] [place] [address], [address]  [place] [address]    [place] [facility]  [address] [place] [address]          [facility] [address]        [address]         [place], [address]          [facility] [address] [place] [address]          [facility] [address]        [address] [place], [place] [facility] [place]  [address]  [place] [address]

## 2020-03-23 NOTE — TELEPHONE ENCOUNTER
Owen Gonzalez is a 34 y.o. Male c/o cough, muscle aches for 2 days. Patient denies fever. 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     Plan:   - [facility] appt today  - Call back if sx worsen or further questions     Reason for Disposition   Cough occurs and onset > 14 days after COVID-19 EXPOSURE   Cough occurs within 14 days of COVID-19 EXPOSURE    Additional Information   Negative: Severe difficulty breathing (e.g., struggling for each breath, speak in single words, bluish lips)   Negative: Sounds like a life-threatening emergency to the triager   Negative: Difficulty breathing occurs within 14 days of COVID-19 EXPOSURE   Negative: Patient sounds very sick or weak to the triager   Negative: Difficulty breathing (shortness of breath) occurs and onset > 14 days after COVID-19 EXPOSURE (Close Contact)   Negative: Common cold symptoms and onset > 14 days after COVID-19 EXPOSURE   Negative: Fever or feeling feverish within 14 Days of COVID-19 EXPOSURE    Protocols used: CORONAVIRUS (COVID-19) EXPOSURE-A-OH

## 2020-03-23 NOTE — ED PROVIDER NOTES
Encounter Date: 3/23/2020       History     Chief Complaint   Patient presents with    Fever    Cough     Presents with complaint of temp hightest 100.3 and cough  Onset 3 days        Review of patient's allergies indicates:  No Known Allergies  No past medical history on file.  Past Surgical History:   Procedure Laterality Date    ANKLE FRACTURE SURGERY Left 2008    left ankle surgery 2008       Family History   Problem Relation Age of Onset    Diabetes Maternal Grandfather      Social History     Tobacco Use    Smoking status: Former Smoker    Smokeless tobacco: Former User     Quit date: 10/1/2015   Substance Use Topics    Alcohol use: Yes     Comment: socially    Drug use: No     Review of Systems   Constitutional: Positive for fever.   Respiratory: Positive for cough. Negative for shortness of breath and wheezing.    Cardiovascular: Negative for chest pain, palpitations and leg swelling.   Gastrointestinal: Negative for abdominal pain, diarrhea, nausea and vomiting.   Musculoskeletal: Negative for back pain.   Skin: Negative for rash.   Neurological: Negative for weakness.       Physical Exam     Initial Vitals [03/23/20 1358]   BP Pulse Resp Temp SpO2   126/76 102 20 99.9 °F (37.7 °C) 95 %      MAP       --         Physical Exam    Constitutional: He appears well-developed and well-nourished.   HENT:   Mouth/Throat: Oropharynx is clear and moist.   Eyes: Conjunctivae are normal.   Neck: Normal range of motion. Neck supple.   Cardiovascular: Normal rate and regular rhythm.   Pulmonary/Chest: Breath sounds normal. No respiratory distress. He has no wheezes. He has no rhonchi.   Abdominal: Soft. Bowel sounds are normal.   Musculoskeletal: Normal range of motion.   Neurological: He is alert and oriented to person, place, and time. No sensory deficit. GCS score is 15. GCS eye subscore is 4. GCS verbal subscore is 5. GCS motor subscore is 6.   Skin: Skin is warm. Capillary refill takes less than 2 seconds.    Psychiatric: He has a normal mood and affect. Thought content normal.         ED Course   Procedures  Labs Reviewed   THROAT SCREEN, RAPID   SARS-COV-2 (COVID-19) QUALITATIVE PCR   INFLUENZA A AND B ANTIGEN          Imaging Results    None          Medical Decision Making:   Initial Assessment:   Cough and fever for 3 days  ED Management:  Have discussed with this pt the possibility of having COVID 19 virus  He has received both written and verbal instructions regarding quarantine  He has verbalized understanding of his instruction  He is in no acute distress and is of low risk of having COVID 19   Pt was brought in from the car line He HR is elevated and temp barely decreased after dose of tylenol      Have discussed this pt with DR Longoria                                 Clinical Impression:       ICD-10-CM ICD-9-CM   1. Bronchitis J40 490                                Maddison Tim NP  03/23/20 1521       Maddison Tim NP  03/23/20 2008

## 2020-03-26 LAB — BACTERIA THROAT CULT: NORMAL

## 2020-03-28 LAB
BACTERIA BLD CULT: NORMAL
BACTERIA BLD CULT: NORMAL
SARS-COV-2 RNA RESP QL NAA+PROBE: NOT DETECTED

## 2020-03-29 ENCOUNTER — TELEPHONE (OUTPATIENT)
Dept: ADMINISTRATIVE | Facility: HOSPITAL | Age: 35
End: 2020-03-29

## 2020-03-29 NOTE — TELEPHONE ENCOUNTER
Instructed patient of NEGATIVE COVID-19 (coronavirus) test result. Further instructed patient, if asymptomatic, self quarantine may be discontinued, however it is recommended to stay home, avoid large crowds and practice proper handwashing. If still symptomatic, instructed patient they may have another respiratory pathogen that is circulating in the community, to avoid work, school and group settings until TWO days following the last day of respiratory symptoms/fever. Instructed to follow up with primary care physician or return to ER if symptoms worsen. Obtained home e-mail address to send a copy of printed instructions. This result was communicated to the patient by Arianne Reyes RN on 03/29/2020.

## 2020-04-16 DIAGNOSIS — F90.0 ATTENTION DEFICIT HYPERACTIVITY DISORDER (ADHD), PREDOMINANTLY INATTENTIVE TYPE: ICD-10-CM

## 2020-04-16 RX ORDER — DEXTROAMPHETAMINE SACCHARATE, AMPHETAMINE ASPARTATE, DEXTROAMPHETAMINE SULFATE AND AMPHETAMINE SULFATE 2.5; 2.5; 2.5; 2.5 MG/1; MG/1; MG/1; MG/1
10 TABLET ORAL DAILY
Qty: 30 TABLET | Refills: 0 | Status: SHIPPED | OUTPATIENT
Start: 2020-05-13 | End: 2020-05-26 | Stop reason: SDUPTHER

## 2020-04-16 NOTE — TELEPHONE ENCOUNTER
----- Message from Kirstin Acuña sent at 4/16/2020  8:55 AM CDT -----  Refill for Asderall. [facility] [address]. Pt #[phone]

## 2020-05-26 DIAGNOSIS — F90.0 ATTENTION DEFICIT HYPERACTIVITY DISORDER (ADHD), PREDOMINANTLY INATTENTIVE TYPE: ICD-10-CM

## 2020-05-26 RX ORDER — DEXTROAMPHETAMINE SACCHARATE, AMPHETAMINE ASPARTATE, DEXTROAMPHETAMINE SULFATE AND AMPHETAMINE SULFATE 2.5; 2.5; 2.5; 2.5 MG/1; MG/1; MG/1; MG/1
10 TABLET ORAL DAILY
Qty: 30 TABLET | Refills: 0 | Status: SHIPPED | OUTPATIENT
Start: 2020-05-26 | End: 2020-06-19 | Stop reason: SDUPTHER

## 2020-05-26 RX ORDER — DEXTROAMPHETAMINE SACCHARATE, AMPHETAMINE ASPARTATE MONOHYDRATE, DEXTROAMPHETAMINE SULFATE AND AMPHETAMINE SULFATE 7.5; 7.5; 7.5; 7.5 MG/1; MG/1; MG/1; MG/1
30 CAPSULE, EXTENDED RELEASE ORAL EVERY MORNING
Qty: 30 CAPSULE | Refills: 0 | Status: SHIPPED | OUTPATIENT
Start: 2020-05-26 | End: 2020-06-19 | Stop reason: SDUPTHER

## 2020-05-26 NOTE — TELEPHONE ENCOUNTER
----- Message from Jennie Fish sent at 5/26/2020  1:06 PM CDT -----  Contact: Owen BRANTLEY  1:00  Refill Adderall 10 mg and Adderall  30 mg. [facility] [address]. pts # [phone] GH

## 2020-06-19 ENCOUNTER — OFFICE VISIT (OUTPATIENT)
Dept: FAMILY MEDICINE | Facility: CLINIC | Age: 35
End: 2020-06-19
Payer: COMMERCIAL

## 2020-06-19 VITALS
DIASTOLIC BLOOD PRESSURE: 84 MMHG | SYSTOLIC BLOOD PRESSURE: 118 MMHG | HEART RATE: 72 BPM | WEIGHT: 216 LBS | HEIGHT: 69 IN | BODY MASS INDEX: 31.99 KG/M2 | TEMPERATURE: 98 F

## 2020-06-19 DIAGNOSIS — F90.0 ATTENTION DEFICIT HYPERACTIVITY DISORDER (ADHD), PREDOMINANTLY INATTENTIVE TYPE: ICD-10-CM

## 2020-06-19 DIAGNOSIS — Z20.822 CLOSE EXPOSURE TO COVID-19 VIRUS: Primary | ICD-10-CM

## 2020-06-19 PROCEDURE — 3008F PR BODY MASS INDEX (BMI) DOCUMENTED: ICD-10-PCS | Mod: S$GLB,,, | Performed by: PHYSICIAN ASSISTANT

## 2020-06-19 PROCEDURE — 99213 PR OFFICE/OUTPT VISIT, EST, LEVL III, 20-29 MIN: ICD-10-PCS | Mod: S$GLB,,, | Performed by: PHYSICIAN ASSISTANT

## 2020-06-19 PROCEDURE — 99213 OFFICE O/P EST LOW 20 MIN: CPT | Mod: S$GLB,,, | Performed by: PHYSICIAN ASSISTANT

## 2020-06-19 PROCEDURE — 3008F BODY MASS INDEX DOCD: CPT | Mod: S$GLB,,, | Performed by: PHYSICIAN ASSISTANT

## 2020-06-19 RX ORDER — DEXTROAMPHETAMINE SACCHARATE, AMPHETAMINE ASPARTATE, DEXTROAMPHETAMINE SULFATE AND AMPHETAMINE SULFATE 2.5; 2.5; 2.5; 2.5 MG/1; MG/1; MG/1; MG/1
10 TABLET ORAL DAILY
Qty: 30 TABLET | Refills: 0 | Status: SHIPPED | OUTPATIENT
Start: 2020-07-26 | End: 2020-09-02

## 2020-06-19 RX ORDER — DEXTROAMPHETAMINE SACCHARATE, AMPHETAMINE ASPARTATE MONOHYDRATE, DEXTROAMPHETAMINE SULFATE AND AMPHETAMINE SULFATE 7.5; 7.5; 7.5; 7.5 MG/1; MG/1; MG/1; MG/1
30 CAPSULE, EXTENDED RELEASE ORAL EVERY MORNING
Qty: 30 CAPSULE | Refills: 0 | Status: SHIPPED | OUTPATIENT
Start: 2020-07-26 | End: 2020-08-25

## 2020-06-19 RX ORDER — DEXTROAMPHETAMINE SACCHARATE, AMPHETAMINE ASPARTATE, DEXTROAMPHETAMINE SULFATE AND AMPHETAMINE SULFATE 2.5; 2.5; 2.5; 2.5 MG/1; MG/1; MG/1; MG/1
10 TABLET ORAL DAILY
Qty: 30 TABLET | Refills: 0 | Status: ON HOLD | OUTPATIENT
Start: 2020-08-26 | End: 2020-08-18 | Stop reason: HOSPADM

## 2020-06-19 RX ORDER — DEXTROAMPHETAMINE SACCHARATE, AMPHETAMINE ASPARTATE MONOHYDRATE, DEXTROAMPHETAMINE SULFATE AND AMPHETAMINE SULFATE 7.5; 7.5; 7.5; 7.5 MG/1; MG/1; MG/1; MG/1
30 CAPSULE, EXTENDED RELEASE ORAL EVERY MORNING
Qty: 30 CAPSULE | Refills: 0 | Status: SHIPPED | OUTPATIENT
Start: 2020-06-26 | End: 2020-07-26

## 2020-06-19 RX ORDER — DEXTROAMPHETAMINE SACCHARATE, AMPHETAMINE ASPARTATE MONOHYDRATE, DEXTROAMPHETAMINE SULFATE AND AMPHETAMINE SULFATE 7.5; 7.5; 7.5; 7.5 MG/1; MG/1; MG/1; MG/1
30 CAPSULE, EXTENDED RELEASE ORAL EVERY MORNING
Qty: 30 CAPSULE | Refills: 0 | Status: ON HOLD | OUTPATIENT
Start: 2020-08-26 | End: 2020-08-18 | Stop reason: HOSPADM

## 2020-06-19 RX ORDER — DEXTROAMPHETAMINE SACCHARATE, AMPHETAMINE ASPARTATE, DEXTROAMPHETAMINE SULFATE AND AMPHETAMINE SULFATE 2.5; 2.5; 2.5; 2.5 MG/1; MG/1; MG/1; MG/1
10 TABLET ORAL DAILY
Qty: 30 TABLET | Refills: 0 | Status: SHIPPED | OUTPATIENT
Start: 2020-06-26 | End: 2020-07-26

## 2020-06-19 NOTE — PATIENT INSTRUCTIONS
Attention Deficit/Hyperactivity Disorder (ADHD, ADD) in Adults  Youve always had trouble concentrating. Your mind wanders, and its hard to finish tasks. As a result, you didnt do well in school. And now, you often struggle with your job. Sometimes this makes you clark or depressed. These may be symptoms of attention deficit  hyperactivity disorder (ADHD) or may still be referred to as attention deficit disorder (ADD). To find out more, talk to your healthcare provider. He or she can offer guidance and support.  Symptoms  of ADD in adults  For an adult to be diagnosed with ADHD, the symptoms must have been present since childhood. The symptoms may include:  · Trouble thinking things through  · Low self-esteem  · Depression  · Trouble holding a job  · Memory problems  · Problems with a marriage or relationship  · Lack of discipline   What is ADHD?  Attention deficit hyperactivity disorder makes it hard to focus your mind. You may daydream a lot. And you may be restless much of the time. As a result, you may have trouble with detailed or boring work. And it may be hard to stick with one project for very long. You also may forget things. Or, you may miss key points during a lecture or meeting. You may even have trouble sitting through a movie or concert. At times, you may feel frustrated or angry. This can affect your relationships with others.  Who does it affect?  ADHD starts in childhood. Sometimes, your symptoms may improve as you get older. But they also may persist into your adult years. ADHD is often thought of as a kids problem. Thats why its often missed in adults. In fact, many parents learn they have ADHD when their children are diagnosed.  What causes it?  The exact cause of ADHD isnt known. The disorder does run in families. Having one parent with ADHD makes it more likely youll have it too. And the part of your brain that controls attention may be involved. Certain brain chemicals that are out  of balance may also play a role.  What can be done?  The first step is finding out if you really have ADHD. Your doctor will use special guidelines to diagnose the disorder. Most adults with ADHD are greatly helped by therapy and coaching. In some cases, your doctor may also prescribe medicine to ease your symptoms.  Resources  · National Resource Center on AD/HDwww.vlup9nuzp.org  · Attention Deficit Disorder Associationwww.add.org   Date Last Reviewed: 1/1/2017 © 2000-2017 [employer]. [address] [place] [address], [place] [address]. All rights reserved. This information is not intended as a substitute for professional medical care. Always follow your healthcare professional's instructions.

## 2020-06-19 NOTE — PROGRESS NOTES
SUBJECTIVE:    Patient ID: Owen Gonzalez is a 34 y.o. male.    Chief Complaint: Follow-up (ADHD, no bottles, went over meds verbally// SW)    34-year-old male presents today for regular ADHD checkup. He reports that he has been doing pretty well overall. Medication doses are effective at the current dose. Denies any SEs. Sleeps well. Occasionally does get a tinnitus and ringing in his ears. Did have his GF that tested positive for COVID-19 in march. Nurse at [facility]. He deveoped all the sxs but was negative. Interested in getting the antibody test done.      Admission on 03/23/2020, Discharged on 03/23/2020   Component Date Value Ref Range Status    SARS-CoV2 (COVID-19) Qualitative P* 03/23/2020 Not Detected  Not Detected Final    Influenza A, Molecular 03/23/2020 Negative  Negative Final    Influenza B, Molecular 03/23/2020 Negative  Negative Final    Flu A & B Source 03/23/2020 Nasal swab   Final    Strep A Culture 03/23/2020 No  Group A  Streptococcus isolated   Final    WBC 03/23/2020 5.43  3.90 - 12.70 K/uL Final    RBC 03/23/2020 4.94  4.60 - 6.20 M/uL Final    Hemoglobin 03/23/2020 15.5  14.0 - 18.0 g/dL Final    Hematocrit 03/23/2020 46.9  40.0 - 54.0 % Final    Mean Corpuscular Volume 03/23/2020 95  82 - 98 fL Final    Mean Corpuscular Hemoglobin 03/23/2020 31.4* 27.0 - 31.0 pg Final    Mean Corpuscular Hemoglobin Conc 03/23/2020 33.0  32.0 - 36.0 g/dL Final    RDW 03/23/2020 11.5  11.5 - 14.5 % Final    Platelets 03/23/2020 130* 150 - 350 K/uL Final    MPV 03/23/2020 10.2  9.2 - 12.9 fL Final    Immature Granulocytes 03/23/2020 0.7* 0.0 - 0.5 % Final    Gran # (ANC) 03/23/2020 3.6  1.8 - 7.7 K/uL Final    Immature Grans (Abs) 03/23/2020 0.04  0.00 - 0.04 K/uL Final    Lymph # 03/23/2020 1.4  1.0 - 4.8 K/uL Final    Mono # 03/23/2020 0.4  0.3 - 1.0 K/uL Final    Eos # 03/23/2020 0.0  0.0 - 0.5 K/uL Final    Baso # 03/23/2020 0.02  0.00 - 0.20 K/uL Final    nRBC  03/23/2020 0  0 /100 WBC Final    Gran% 03/23/2020 65.6  38.0 - 73.0 % Final    Lymph% 03/23/2020 26.5  18.0 - 48.0 % Final    Mono% 03/23/2020 6.8  4.0 - 15.0 % Final    Eosinophil% 03/23/2020 0.0  0.0 - 8.0 % Final    Basophil% 03/23/2020 0.4  0.0 - 1.9 % Final    Differential Method 03/23/2020 Automated   Final    Sodium 03/23/2020 133* 136 - 145 mmol/L Final    Potassium 03/23/2020 4.1  3.5 - 5.1 mmol/L Final    Chloride 03/23/2020 97  95 - 110 mmol/L Final    CO2 03/23/2020 27  23 - 29 mmol/L Final    Glucose 03/23/2020 91  70 - 110 mg/dL Final    BUN, Bld 03/23/2020 18  6 - 20 mg/dL Final    Creatinine 03/23/2020 1.1  0.5 - 1.4 mg/dL Final    Calcium 03/23/2020 8.8  8.7 - 10.5 mg/dL Final    Total Protein 03/23/2020 8.3  6.0 - 8.4 g/dL Final    Albumin 03/23/2020 4.3  3.5 - 5.2 g/dL Final    Total Bilirubin 03/23/2020 1.2* 0.1 - 1.0 mg/dL Final    Alkaline Phosphatase 03/23/2020 59  55 - 135 U/L Final    AST 03/23/2020 30  10 - 40 U/L Final    ALT 03/23/2020 29  10 - 44 U/L Final    Anion Gap 03/23/2020 9  8 - 16 mmol/L Final    eGFR if African American 03/23/2020 >60.0  >60 mL/min/1.73 m^2 Final    eGFR if non African American 03/23/2020 >60.0  >60 mL/min/1.73 m^2 Final    Blood Culture, Routine 03/23/2020 No growth after 5 days.   Final    Blood Culture, Routine 03/23/2020 No growth after 5 days.   Final    Procalcitonin 03/23/2020 <0.05  0.00 - 0.50 ng/mL Final    Lactate (Lactic Acid) 03/23/2020 1.0  0.5 - 1.9 mmol/L Final   Office Visit on 02/13/2020   Component Date Value Ref Range Status    WBC 02/14/2020 7.0  3.8 - 10.8 Thousand/uL Final    RBC 02/14/2020 5.08  4.20 - 5.80 Million/uL Final    Hemoglobin 02/14/2020 16.2  13.2 - 17.1 g/dL Final    Hematocrit 02/14/2020 47.5  38.5 - 50.0 % Final    Mean Corpuscular Volume 02/14/2020 93.5  80.0 - 100.0 fL Final    Mean Corpuscular Hemoglobin 02/14/2020 31.9  27.0 - 33.0 pg Final    Mean Corpuscular Hemoglobin Conc  02/14/2020 34.1  32.0 - 36.0 g/dL Final    RDW 02/14/2020 11.9  11.0 - 15.0 % Final    Platelets 02/14/2020 224  140 - 400 Thousand/uL Final    MPV 02/14/2020 10.6  7.5 - 12.5 fL Final    Neutrophils Absolute 02/14/2020 4,382  1,500 - 7,800 cells/uL Final    Lymph # 02/14/2020 1,897  850 - 3,900 cells/uL Final    Mono # 02/14/2020 560  200 - 950 cells/uL Final    Eos # 02/14/2020 119  15 - 500 cells/uL Final    Baso # 02/14/2020 42  0 - 200 cells/uL Final    Neutrophils Relative 02/14/2020 62.6  % Final    Lymph% 02/14/2020 27.1  % Final    Mono% 02/14/2020 8.0  % Final    Eosinophil% 02/14/2020 1.7  % Final    Basophil% 02/14/2020 0.6  % Final    Glucose 02/14/2020 97  65 - 99 mg/dL Final    BUN, Bld 02/14/2020 17  7 - 25 mg/dL Final    Creatinine 02/14/2020 0.95  0.60 - 1.35 mg/dL Final    eGFR if non African American 02/14/2020 105  > OR = 60 mL/min/1.73m2 Final    eGFR if  02/14/2020 121  > OR = 60 mL/min/1.73m2 Final    BUN/Creatinine Ratio 02/14/2020 NOT APPLICABLE  6 - 22 (calc) Final    Sodium 02/14/2020 139  135 - 146 mmol/L Final    Potassium 02/14/2020 4.6  3.5 - 5.3 mmol/L Final    Chloride 02/14/2020 103  98 - 110 mmol/L Final    CO2 02/14/2020 28  20 - 32 mmol/L Final    Calcium 02/14/2020 9.5  8.6 - 10.3 mg/dL Final    Total Protein 02/14/2020 7.9  6.1 - 8.1 g/dL Final    Albumin 02/14/2020 4.6  3.6 - 5.1 g/dL Final    Globulin, Total 02/14/2020 3.3  1.9 - 3.7 g/dL (calc) Final    Albumin/Globulin Ratio 02/14/2020 1.4  1.0 - 2.5 (calc) Final    Total Bilirubin 02/14/2020 0.6  0.2 - 1.2 mg/dL Final    Alkaline Phosphatase 02/14/2020 58  36 - 130 U/L Final    AST 02/14/2020 68* 10 - 40 U/L Final    ALT 02/14/2020 53* 9 - 46 U/L Final    Cholesterol 02/14/2020 213* <200 mg/dL Final    HDL 02/14/2020 64  > OR = 40 mg/dL Final    Triglycerides 02/14/2020 55  <150 mg/dL Final    LDL Cholesterol 02/14/2020 134* mg/dL (calc) Final    Hdl/Cholesterol Ratio  02/14/2020 3.3  <5.0 (calc) Final    Non HDL Chol. (LDL+VLDL) 02/14/2020 149* <130 mg/dL (calc) Final    TSH w/reflex to FT4 02/14/2020 1.07  0.40 - 4.50 mIU/L Final    Color, UA 02/14/2020 YELLOW  YELLOW Final    Appearance, UA 02/14/2020 CLEAR  CLEAR Final    Specific Gravity, UA 02/14/2020 1.020  1.001 - 1.035 Final    pH, UA 02/14/2020 6.5  5.0 - 8.0 Final    Glucose, UA 02/14/2020 NEGATIVE  NEGATIVE Final    Bilirubin, UA 02/14/2020 NEGATIVE  NEGATIVE Final    Ketones, UA 02/14/2020 NEGATIVE  NEGATIVE Final    Occult Blood UA 02/14/2020 NEGATIVE  NEGATIVE Final    Protein, UA 02/14/2020 NEGATIVE  NEGATIVE Final    Nitrite, UA 02/14/2020 NEGATIVE  NEGATIVE Final    Leukocytes, UA 02/14/2020 NEGATIVE  NEGATIVE Final    WBC Casts, UA 02/14/2020 NONE SEEN  < OR = 5 /HPF Final    RBC Casts, UA 02/14/2020 NONE SEEN  < OR = 2 /HPF Final    Squam Epithel, UA 02/14/2020 NONE SEEN  < OR = 5 /HPF Final    Bacteria, UA 02/14/2020 NONE SEEN  NONE SEEN /HPF Final    Hyaline Casts, UA 02/14/2020 0-5* NONE SEEN /LPF Final    Reflexive Urine Culture 02/14/2020 NO CULTURE INDICATED   Final       History reviewed. No pertinent past medical history.  Past Surgical History:   Procedure Laterality Date    ANKLE FRACTURE SURGERY Left 2008    left ankle surgery 2008       Family History   Problem Relation Age of Onset    Diabetes Maternal Grandfather        Marital Status: Single  Alcohol History:  reports current alcohol use.  Tobacco History:  reports that he has quit smoking. He quit smokeless tobacco use about 4 years ago.  Drug History:  reports no history of drug use.    Review of patient's allergies indicates:  No Known Allergies    Current Outpatient Medications:     albuterol (PROVENTIL/VENTOLIN HFA) 90 mcg/actuation inhaler, Inhale 1-2 puffs into the lungs every 6 (six) hours as needed for Wheezing. Rescue, Disp: 1 g, Rfl: 0    [START ON 6/26/2020] dextroamphetamine-amphetamine (ADDERALL XR) 30 MG 24  "hr capsule, Take 1 capsule (30 mg total) by mouth every morning., Disp: 30 capsule, Rfl: 0    [START ON 7/26/2020] dextroamphetamine-amphetamine (ADDERALL XR) 30 MG 24 hr capsule, Take 1 capsule (30 mg total) by mouth every morning., Disp: 30 capsule, Rfl: 0    [START ON 8/26/2020] dextroamphetamine-amphetamine (ADDERALL XR) 30 MG 24 hr capsule, Take 1 capsule (30 mg total) by mouth every morning., Disp: 30 capsule, Rfl: 0    [START ON 6/26/2020] dextroamphetamine-amphetamine (ADDERALL) 10 mg Tab, Take 1 tablet (10 mg total) by mouth once daily., Disp: 30 tablet, Rfl: 0    [START ON 7/26/2020] dextroamphetamine-amphetamine (ADDERALL) 10 mg Tab, Take 1 tablet (10 mg total) by mouth once daily., Disp: 30 tablet, Rfl: 0    [START ON 8/26/2020] dextroamphetamine-amphetamine (ADDERALL) 10 mg Tab, Take 1 tablet (10 mg total) by mouth once daily., Disp: 30 tablet, Rfl: 0    Review of Systems   Constitutional: Negative for activity change, fatigue, fever and unexpected weight change.   HENT: Negative for congestion.    Respiratory: Negative for apnea, cough, chest tightness and shortness of breath.    Cardiovascular: Negative for chest pain and palpitations.   Gastrointestinal: Negative for abdominal distention and abdominal pain.   Genitourinary: Negative for difficulty urinating and dysuria.   Musculoskeletal: Negative for arthralgias and back pain.   Neurological: Negative for dizziness and weakness.          Objective:      Vitals:    06/19/20 0804   BP: 118/84   Pulse: 72   Temp: 98.3 °F (36.8 °C)   Weight: 98 kg (216 lb)   Height: 5' 8.5" (1.74 m)     Physical Exam  Constitutional:       General: He is not in acute distress.     Appearance: He is well-developed.   HENT:      Head: Normocephalic and atraumatic.   Eyes:      Pupils: Pupils are equal, round, and reactive to light.   Neck:      Musculoskeletal: Normal range of motion and neck supple.      Thyroid: No thyromegaly.   Cardiovascular:      Rate and " Rhythm: Normal rate and regular rhythm.      Heart sounds: Normal heart sounds.   Pulmonary:      Effort: Pulmonary effort is normal.      Breath sounds: Normal breath sounds.   Abdominal:      General: Bowel sounds are normal. There is no distension.      Palpations: Abdomen is soft.      Tenderness: There is no abdominal tenderness.   Musculoskeletal: Normal range of motion.   Skin:     General: Skin is warm and dry.      Findings: No erythema or rash.   Neurological:      Mental Status: He is alert and oriented to person, place, and time.      Cranial Nerves: No cranial nerve deficit.           Assessment:       1. Close Exposure to Covid-19 Virus    2. Attention deficit hyperactivity disorder (ADHD), predominantly inattentive type         Plan:       Close Exposure to Covid-19 Virus  Comments:  GIven acute test was negative but extremely close exposure with GF being positive we will check IgG now..  Orders:  -     COVID-19 (SARS CoV-2) IgG Antibody; Future; Expected date: 06/19/2020    Attention deficit hyperactivity disorder (ADHD), predominantly inattentive type  Comments:  Doing very well. continue as is.  Orders:  -     dextroamphetamine-amphetamine (ADDERALL XR) 30 MG 24 hr capsule; Take 1 capsule (30 mg total) by mouth every morning.  Dispense: 30 capsule; Refill: 0  -     dextroamphetamine-amphetamine (ADDERALL) 10 mg Tab; Take 1 tablet (10 mg total) by mouth once daily.  Dispense: 30 tablet; Refill: 0  -     dextroamphetamine-amphetamine (ADDERALL XR) 30 MG 24 hr capsule; Take 1 capsule (30 mg total) by mouth every morning.  Dispense: 30 capsule; Refill: 0  -     dextroamphetamine-amphetamine (ADDERALL XR) 30 MG 24 hr capsule; Take 1 capsule (30 mg total) by mouth every morning.  Dispense: 30 capsule; Refill: 0  -     dextroamphetamine-amphetamine (ADDERALL) 10 mg Tab; Take 1 tablet (10 mg total) by mouth once daily.  Dispense: 30 tablet; Refill: 0  -     dextroamphetamine-amphetamine (ADDERALL) 10 mg  Tab; Take 1 tablet (10 mg total) by mouth once daily.  Dispense: 30 tablet; Refill: 0      Follow up in about 3 months (around 9/19/2020) for adhd checkup.        6/19/2020 Johnathan Gill PA-C

## 2020-07-09 ENCOUNTER — TELEPHONE (OUTPATIENT)
Dept: FAMILY MEDICINE | Facility: CLINIC | Age: 35
End: 2020-07-09

## 2020-07-09 NOTE — TELEPHONE ENCOUNTER
----- Message from Rachael Mohamud sent at 7/9/2020  2:34 PM CDT -----  Regarding: Refill  Contact: Owen Gonzalez  [facility]all [facility] [address]  Pt# [phone]

## 2020-08-04 ENCOUNTER — TELEPHONE (OUTPATIENT)
Dept: FAMILY MEDICINE | Facility: CLINIC | Age: 35
End: 2020-08-04
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
## 2020-08-06 DIAGNOSIS — M89.8X1 PAIN OF RIGHT CLAVICLE: Primary | ICD-10-CM

## 2020-08-17 ENCOUNTER — HOSPITAL ENCOUNTER (OUTPATIENT)
Dept: PREADMISSION TESTING | Facility: HOSPITAL | Age: 35
Discharge: HOME | End: 2020-08-17
Attending: ORTHOPAEDIC SURGERY

## 2020-08-17 ENCOUNTER — ANESTHESIA EVENT (OUTPATIENT)
Dept: SURGERY | Facility: HOSPITAL | Age: 35
End: 2020-08-17

## 2020-08-17 ENCOUNTER — OFFICE VISIT (OUTPATIENT)
Dept: ORTHOPEDICS | Facility: CLINIC | Age: 35
End: 2020-08-17
Payer: COMMERCIAL

## 2020-08-17 VITALS
WEIGHT: 217 LBS | DIASTOLIC BLOOD PRESSURE: 88 MMHG | BODY MASS INDEX: 32.14 KG/M2 | SYSTOLIC BLOOD PRESSURE: 131 MMHG | HEIGHT: 69 IN | HEART RATE: 84 BPM

## 2020-08-17 VITALS — WEIGHT: 217 LBS | HEIGHT: 69 IN | BODY MASS INDEX: 32.14 KG/M2

## 2020-08-17 DIAGNOSIS — S42.021A CLOSED DISPLACED FRACTURE OF SHAFT OF RIGHT CLAVICLE, INITIAL ENCOUNTER: Primary | ICD-10-CM

## 2020-08-17 DIAGNOSIS — M89.8X1 PAIN OF RIGHT CLAVICLE: ICD-10-CM

## 2020-08-17 DIAGNOSIS — S42.021A CLOSED DISPLACED FRACTURE OF SHAFT OF RIGHT CLAVICLE: ICD-10-CM

## 2020-08-17 DIAGNOSIS — S42.021A CLOSED DISPLACED FRACTURE OF SHAFT OF RIGHT CLAVICLE, INITIAL ENCOUNTER: ICD-10-CM

## 2020-08-17 LAB
ALBUMIN SERPL BCP-MCNC: 4.2 G/DL (ref 3.5–5.2)
ALP SERPL-CCNC: 79 U/L (ref 55–135)
ALT SERPL W/O P-5'-P-CCNC: 88 U/L (ref 10–44)
ANION GAP SERPL CALC-SCNC: 10 MMOL/L (ref 8–16)
AST SERPL-CCNC: 50 U/L (ref 10–40)
BASOPHILS # BLD AUTO: 0.05 K/UL (ref 0–0.2)
BASOPHILS NFR BLD: 0.5 % (ref 0–1.9)
BILIRUB SERPL-MCNC: 0.7 MG/DL (ref 0.1–1)
BUN SERPL-MCNC: 12 MG/DL (ref 6–20)
CALCIUM SERPL-MCNC: 9.3 MG/DL (ref 8.7–10.5)
CHLORIDE SERPL-SCNC: 103 MMOL/L (ref 95–110)
CO2 SERPL-SCNC: 27 MMOL/L (ref 23–29)
CREAT SERPL-MCNC: 1 MG/DL (ref 0.5–1.4)
DIFFERENTIAL METHOD: ABNORMAL
EOSINOPHIL # BLD AUTO: 0.2 K/UL (ref 0–0.5)
EOSINOPHIL NFR BLD: 2.2 % (ref 0–8)
ERYTHROCYTE [DISTWIDTH] IN BLOOD BY AUTOMATED COUNT: 12.1 % (ref 11.5–14.5)
EST. GFR  (AFRICAN AMERICAN): >60 ML/MIN/1.73 M^2
EST. GFR  (NON AFRICAN AMERICAN): >60 ML/MIN/1.73 M^2
GLUCOSE SERPL-MCNC: 99 MG/DL (ref 70–110)
HCT VFR BLD AUTO: 47.3 % (ref 40–54)
HGB BLD-MCNC: 15.2 G/DL (ref 14–18)
IMM GRANULOCYTES # BLD AUTO: 0.11 K/UL (ref 0–0.04)
IMM GRANULOCYTES NFR BLD AUTO: 1.2 % (ref 0–0.5)
LYMPHOCYTES # BLD AUTO: 2.1 K/UL (ref 1–4.8)
LYMPHOCYTES NFR BLD: 22.7 % (ref 18–48)
MCH RBC QN AUTO: 32.3 PG (ref 27–31)
MCHC RBC AUTO-ENTMCNC: 32.1 G/DL (ref 32–36)
MCV RBC AUTO: 100 FL (ref 82–98)
MONOCYTES # BLD AUTO: 0.7 K/UL (ref 0.3–1)
MONOCYTES NFR BLD: 7.6 % (ref 4–15)
NEUTROPHILS # BLD AUTO: 6.1 K/UL (ref 1.8–7.7)
NEUTROPHILS NFR BLD: 65.8 % (ref 38–73)
NRBC BLD-RTO: 0 /100 WBC
PLATELET # BLD AUTO: 205 K/UL (ref 150–350)
PMV BLD AUTO: 9.7 FL (ref 9.2–12.9)
POTASSIUM SERPL-SCNC: 3.8 MMOL/L (ref 3.5–5.1)
PROT SERPL-MCNC: 8.2 G/DL (ref 6–8.4)
RBC # BLD AUTO: 4.71 M/UL (ref 4.6–6.2)
SODIUM SERPL-SCNC: 140 MMOL/L (ref 136–145)
WBC # BLD AUTO: 9.24 K/UL (ref 3.9–12.7)

## 2020-08-17 PROCEDURE — 99203 PR OFFICE/OUTPT VISIT, NEW, LEVL III, 30-44 MIN: ICD-10-PCS | Mod: S$GLB,,, | Performed by: ORTHOPAEDIC SURGERY

## 2020-08-17 PROCEDURE — 3008F BODY MASS INDEX DOCD: CPT | Mod: S$GLB,,, | Performed by: ORTHOPAEDIC SURGERY

## 2020-08-17 PROCEDURE — 3008F PR BODY MASS INDEX (BMI) DOCUMENTED: ICD-10-PCS | Mod: S$GLB,,, | Performed by: ORTHOPAEDIC SURGERY

## 2020-08-17 PROCEDURE — 80053 COMPREHEN METABOLIC PANEL: CPT

## 2020-08-17 PROCEDURE — 36415 COLL VENOUS BLD VENIPUNCTURE: CPT

## 2020-08-17 PROCEDURE — 99900104 DSU ONLY-NO CHARGE-EA ADD'L HR (STAT)

## 2020-08-17 PROCEDURE — 99900103 DSU ONLY-NO CHARGE-INITIAL HR (STAT)

## 2020-08-17 PROCEDURE — 99203 OFFICE O/P NEW LOW 30 MIN: CPT | Mod: S$GLB,,, | Performed by: ORTHOPAEDIC SURGERY

## 2020-08-17 PROCEDURE — 85025 COMPLETE CBC W/AUTO DIFF WBC: CPT

## 2020-08-17 RX ORDER — MUPIROCIN 20 MG/G
OINTMENT TOPICAL
Status: CANCELLED | OUTPATIENT
Start: 2020-08-17

## 2020-08-17 RX ORDER — HYDROCODONE BITARTRATE AND ACETAMINOPHEN 10; 325 MG/1; MG/1
1 TABLET ORAL EVERY 6 HOURS PRN
Qty: 28 TABLET | Refills: 0 | Status: SHIPPED | OUTPATIENT
Start: 2020-08-17 | End: 2020-08-24

## 2020-08-17 RX ORDER — HYDROCODONE BITARTRATE AND ACETAMINOPHEN 5; 325 MG/1; MG/1
1 TABLET ORAL
COMMUNITY
Start: 2020-08-10 | End: 2020-08-17 | Stop reason: DRUGHIGH

## 2020-08-17 RX ORDER — CYCLOBENZAPRINE HCL 10 MG
TABLET ORAL
COMMUNITY
Start: 2020-08-06 | End: 2020-10-21

## 2020-08-17 RX ORDER — IBUPROFEN 800 MG/1
TABLET ORAL
COMMUNITY
Start: 2020-08-06 | End: 2020-10-21
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
## 2020-08-17 NOTE — DISCHARGE INSTRUCTIONS
To confirm, Your doctor has instructed you that surgery is scheduled for: 8/18/2020    Please report to [facility] the morning of surgery. You must check-in and receive a wristband before going to your procedure.    Pre-Op will call the afternoon prior to surgery between 1:00 and 6:00 PM with the final arrival time.  Phone number: [phone]    PLEASE NOTE:  The surgery schedule has many variables which may affect the time of your surgery case.  Family members should be available if your surgery time changes.  Plan to be here the day of your procedure between 4-6 hours.    MEDICATIONS:  TAKE ONLY THESE MEDICATIONS WITH A SMALL SIP OF WATER THE MORNING OF YOUR PROCEDURE:  NORCO IF NEEDED, FLEXERIL IF NEEDED    DO NOT TAKE THESE MEDICATIONS 5-7 DAYS PRIOR to your procedure or per your surgeon's request: ASPIRIN, ALEVE, ADVIL, IBUPROFEN, FISH OIL VITAMIN E, HERBALS  (May take Tylenol)    ONLY if you are prescribed any types of blood thinners such as:  Aspirin, Coumadin, Plavix, Pradaxa, Xarelto, Aggrenox, Effient, Eliquis, Savasya, Brilinta, or any other, ask your surgeon whether you should stop taking them and how long before surgery you should stop.  You may also need to verify with the prescribing physician if it is ok to stop your medication.      INSTRUCTIONS IMPORTANT!!  · Do not eat or drink anything between midnight and the time of your procedure- this includes gum, mints, and candy.  · Do not smoke or drink alcoholic beverages 24 hours prior to your procedure.  · Shower the night before AND the morning of your procedure with a Chlorhexidine wash such as Hibiclens or Dial antibacterial soap from the neck down.  Do not get it on your face or in your eyes.  You may use your own shampoo and face wash. This helps your skin to be as bacteria free as possible.    · If you wear contact lenses, dentures, hearing aids or glasses, bring a container to put them in during surgery and  give to a family member for safe keeping.  Please leave all jewelry, piercing's and valuables at home.   · DO NOT remove hair from the surgery site.  Do not shave the incision site unless you are given specific instructions to do so.    · ONLY if you have been diagnosed with sleep apnea please bring your C-PAP machine.  · ONLY if you wear home oxygen please bring your portable oxygen tank the day of your procedure.  · ONLY if you have a history of OPEN HEART SURGERY you will need a clearance from your Cardiologist per Anesthesia.      · ONLY for patients requiring bowel prep, written instructions will be given by your doctor's office.  · ONLY if you have a neuro stimulator, please bring the controller with you the morning of surgery  · ONLY if a type and screen test is needed before surgery, please return: N/A  · If your doctor has scheduled you for an overnight stay, bring a small overnight bag with any personal items you need.  · Make arrangements in advance for transportation home by a responsible adult.  It is not safe to drive a vehicle during the 24 hours after anesthesia.     · ONLY ONE VISITOR PER PATIENT IS ALLOWED TO COME IN THE HOSPITAL THE DAY OF PROCEDURE.   · Visiting hours are 8:00AM-6:00PM. For the safety of all patients, visitors under the age of 12 are not allowed above the first floor of the hospital.    · All [facility] facilities and properties are tobacco free.  Smoking is NOT allowed.       If you have any questions about these instructions, call Pre-Op Admit  Nursing at [phone] or the Pre-Op Day Surgery Unit at [phone].

## 2020-08-17 NOTE — LETTER
August 17, 2020      [facility]  [address]  [place] [address]  Phone: [phone]  Fax: [phone]       Patient: Owen Gonzalez   YOB: 1985  Date of Visit: 08/17/2020    To Human Resources:    Tiana Gonzalez  was at [facility] on 08/17/2020. He is not fit for working duty due to medical condition and urgent surgery.     Sincerely,      Electronically Signed By: Murtaza Adkins M.D.

## 2020-08-18 ENCOUNTER — ANESTHESIA (OUTPATIENT)
Dept: SURGERY | Facility: HOSPITAL | Age: 35
End: 2020-08-18

## 2020-08-18 ENCOUNTER — HOSPITAL ENCOUNTER (OUTPATIENT)
Facility: HOSPITAL | Age: 35
Discharge: HOME | End: 2020-08-18
Attending: ORTHOPAEDIC SURGERY | Admitting: ORTHOPAEDIC SURGERY

## 2020-08-18 DIAGNOSIS — S42.021A CLOSED DISPLACED FRACTURE OF SHAFT OF RIGHT CLAVICLE, INITIAL ENCOUNTER: Primary | ICD-10-CM

## 2020-08-18 DIAGNOSIS — S42.021A CLOSED DISPLACED FRACTURE OF SHAFT OF RIGHT CLAVICLE: ICD-10-CM

## 2020-08-18 DIAGNOSIS — M89.8X1 PAIN OF RIGHT CLAVICLE: ICD-10-CM

## 2020-08-18 LAB — SARS-COV-2 RDRP RESP QL NAA+PROBE: NEGATIVE

## 2020-08-18 PROCEDURE — 36000711: Performed by: ORTHOPAEDIC SURGERY

## 2020-08-18 PROCEDURE — D9220A PRA ANESTHESIA: ICD-10-PCS | Mod: ,,, | Performed by: ANESTHESIOLOGY

## 2020-08-18 PROCEDURE — 63600175 PHARM REV CODE 636 W HCPCS: Performed by: ANESTHESIOLOGY

## 2020-08-18 PROCEDURE — C1769 GUIDE WIRE: HCPCS | Performed by: ORTHOPAEDIC SURGERY

## 2020-08-18 PROCEDURE — 63600175 PHARM REV CODE 636 W HCPCS: Performed by: ORTHOPAEDIC SURGERY

## 2020-08-18 PROCEDURE — 37000008 HC ANESTHESIA 1ST 15 MINUTES: Performed by: ORTHOPAEDIC SURGERY

## 2020-08-18 PROCEDURE — 63600175 PHARM REV CODE 636 W HCPCS: Performed by: NURSE ANESTHETIST, CERTIFIED REGISTERED

## 2020-08-18 PROCEDURE — 71000033 HC RECOVERY, INTIAL HOUR: Performed by: ORTHOPAEDIC SURGERY

## 2020-08-18 PROCEDURE — 71000015 HC POSTOP RECOV 1ST HR: Performed by: ORTHOPAEDIC SURGERY

## 2020-08-18 PROCEDURE — D9220A PRA ANESTHESIA: Mod: ,,, | Performed by: ANESTHESIOLOGY

## 2020-08-18 PROCEDURE — 27201423 OPTIME MED/SURG SUP & DEVICES STERILE SUPPLY: Performed by: ORTHOPAEDIC SURGERY

## 2020-08-18 PROCEDURE — 25000003 PHARM REV CODE 250: Performed by: ORTHOPAEDIC SURGERY

## 2020-08-18 PROCEDURE — 99900104 DSU ONLY-NO CHARGE-EA ADD'L HR (STAT): Performed by: ORTHOPAEDIC SURGERY

## 2020-08-18 PROCEDURE — C1713 ANCHOR/SCREW BN/BN,TIS/BN: HCPCS | Performed by: ORTHOPAEDIC SURGERY

## 2020-08-18 PROCEDURE — 63600175 PHARM REV CODE 636 W HCPCS: Performed by: REGISTERED NURSE

## 2020-08-18 PROCEDURE — 36000710: Performed by: ORTHOPAEDIC SURGERY

## 2020-08-18 PROCEDURE — 25000003 PHARM REV CODE 250: Performed by: ANESTHESIOLOGY

## 2020-08-18 PROCEDURE — U0002 COVID-19 LAB TEST NON-CDC: HCPCS

## 2020-08-18 PROCEDURE — 99900103 DSU ONLY-NO CHARGE-INITIAL HR (STAT): Performed by: ORTHOPAEDIC SURGERY

## 2020-08-18 PROCEDURE — 37000009 HC ANESTHESIA EA ADD 15 MINS: Performed by: ORTHOPAEDIC SURGERY

## 2020-08-18 PROCEDURE — 25000003 PHARM REV CODE 250: Performed by: REGISTERED NURSE

## 2020-08-18 PROCEDURE — 25000003 PHARM REV CODE 250: Performed by: NURSE ANESTHETIST, CERTIFIED REGISTERED

## 2020-08-18 PROCEDURE — 71000039 HC RECOVERY, EACH ADD'L HOUR: Performed by: ORTHOPAEDIC SURGERY

## 2020-08-18 DEVICE — SCREW BONE NLHEXALOBE 3.5 X 18: Type: IMPLANTABLE DEVICE | Site: CLAVICLE | Status: FUNCTIONAL

## 2020-08-18 DEVICE — SCREW BNE LOK HEXLB 3.5X12: Type: IMPLANTABLE DEVICE | Site: CLAVICLE | Status: FUNCTIONAL

## 2020-08-18 DEVICE — SCREW BNE N LOK HEXLB 3.5X14: Type: IMPLANTABLE DEVICE | Site: CLAVICLE | Status: FUNCTIONAL

## 2020-08-18 DEVICE — SCREW BNE LOK HEXLB 3.5X14: Type: IMPLANTABLE DEVICE | Site: CLAVICLE | Status: FUNCTIONAL

## 2020-08-18 DEVICE — SCREW BNE N LOK HEXLB 3.5X12: Type: IMPLANTABLE DEVICE | Site: CLAVICLE | Status: FUNCTIONAL

## 2020-08-18 RX ORDER — OXYCODONE AND ACETAMINOPHEN 5; 325 MG/1; MG/1
1 TABLET ORAL EVERY 30 MIN PRN
Status: COMPLETED | OUTPATIENT
Start: 2020-08-18 | End: 2020-08-18

## 2020-08-18 RX ORDER — FENTANYL CITRATE 50 UG/ML
25 INJECTION, SOLUTION INTRAMUSCULAR; INTRAVENOUS EVERY 5 MIN PRN
Status: COMPLETED | OUTPATIENT
Start: 2020-08-18 | End: 2020-08-18

## 2020-08-18 RX ORDER — METOCLOPRAMIDE HYDROCHLORIDE 5 MG/ML
10 INJECTION INTRAMUSCULAR; INTRAVENOUS EVERY 10 MIN PRN
Status: DISCONTINUED | OUTPATIENT
Start: 2020-08-18 | End: 2020-08-20 | Stop reason: HOSPADM

## 2020-08-18 RX ORDER — SODIUM CHLORIDE 0.9 % (FLUSH) 0.9 %
10 SYRINGE (ML) INJECTION
Status: DISCONTINUED | OUTPATIENT
Start: 2020-08-18 | End: 2020-08-20 | Stop reason: HOSPADM

## 2020-08-18 RX ORDER — GLYCOPYRROLATE 0.2 MG/ML
INJECTION INTRAMUSCULAR; INTRAVENOUS
Status: DISCONTINUED | OUTPATIENT
Start: 2020-08-18 | End: 2020-08-18

## 2020-08-18 RX ORDER — MIDAZOLAM HYDROCHLORIDE 1 MG/ML
INJECTION INTRAMUSCULAR; INTRAVENOUS
Status: DISCONTINUED | OUTPATIENT
Start: 2020-08-18 | End: 2020-08-18

## 2020-08-18 RX ORDER — PHENYLEPHRINE HYDROCHLORIDE 10 MG/ML
INJECTION INTRAVENOUS
Status: DISCONTINUED | OUTPATIENT
Start: 2020-08-18 | End: 2020-08-18

## 2020-08-18 RX ORDER — NEOSTIGMINE METHYLSULFATE 1 MG/ML
INJECTION, SOLUTION INTRAVENOUS
Status: DISCONTINUED | OUTPATIENT
Start: 2020-08-18 | End: 2020-08-18

## 2020-08-18 RX ORDER — HYDROMORPHONE HYDROCHLORIDE 2 MG/ML
0.2 INJECTION, SOLUTION INTRAMUSCULAR; INTRAVENOUS; SUBCUTANEOUS EVERY 5 MIN PRN
Status: DISCONTINUED | OUTPATIENT
Start: 2020-08-18 | End: 2020-08-20 | Stop reason: HOSPADM

## 2020-08-18 RX ORDER — BUPIVACAINE HYDROCHLORIDE AND EPINEPHRINE 2.5; 5 MG/ML; UG/ML
INJECTION, SOLUTION EPIDURAL; INFILTRATION; INTRACAUDAL; PERINEURAL
Status: DISCONTINUED | OUTPATIENT
Start: 2020-08-18 | End: 2020-08-18 | Stop reason: HOSPADM

## 2020-08-18 RX ORDER — ONDANSETRON HYDROCHLORIDE 2 MG/ML
INJECTION, SOLUTION INTRAMUSCULAR; INTRAVENOUS
Status: DISCONTINUED | OUTPATIENT
Start: 2020-08-18 | End: 2020-08-18

## 2020-08-18 RX ORDER — LIDOCAINE HYDROCHLORIDE 10 MG/ML
1 INJECTION, SOLUTION EPIDURAL; INFILTRATION; INTRACAUDAL; PERINEURAL ONCE
Status: COMPLETED | OUTPATIENT
Start: 2020-08-18 | End: 2020-08-18

## 2020-08-18 RX ORDER — LIDOCAINE HCL/PF 100 MG/5ML
SYRINGE (ML) INTRAVENOUS
Status: DISCONTINUED | OUTPATIENT
Start: 2020-08-18 | End: 2020-08-18

## 2020-08-18 RX ORDER — OXYCODONE HYDROCHLORIDE 10 MG/1
10 TABLET ORAL EVERY 4 HOURS PRN
Status: DISCONTINUED | OUTPATIENT
Start: 2020-08-18 | End: 2020-08-18

## 2020-08-18 RX ORDER — FENTANYL CITRATE 50 UG/ML
INJECTION, SOLUTION INTRAMUSCULAR; INTRAVENOUS
Status: DISCONTINUED | OUTPATIENT
Start: 2020-08-18 | End: 2020-08-18

## 2020-08-18 RX ORDER — ONDANSETRON 2 MG/ML
4 INJECTION INTRAMUSCULAR; INTRAVENOUS EVERY 12 HOURS PRN
Status: DISCONTINUED | OUTPATIENT
Start: 2020-08-18 | End: 2020-08-20 | Stop reason: HOSPADM

## 2020-08-18 RX ORDER — HYDROCODONE BITARTRATE AND ACETAMINOPHEN 5; 325 MG/1; MG/1
1 TABLET ORAL EVERY 4 HOURS PRN
Status: DISCONTINUED | OUTPATIENT
Start: 2020-08-18 | End: 2020-08-18

## 2020-08-18 RX ORDER — MUPIROCIN 20 MG/G
OINTMENT TOPICAL
Status: DISCONTINUED | OUTPATIENT
Start: 2020-08-18 | End: 2020-08-20 | Stop reason: HOSPADM

## 2020-08-18 RX ORDER — OXYCODONE HYDROCHLORIDE 5 MG/1
5 TABLET ORAL
Status: DISCONTINUED | OUTPATIENT
Start: 2020-08-18 | End: 2020-08-18

## 2020-08-18 RX ORDER — PROPOFOL 10 MG/ML
VIAL (ML) INTRAVENOUS
Status: DISCONTINUED | OUTPATIENT
Start: 2020-08-18 | End: 2020-08-18

## 2020-08-18 RX ORDER — HYDROMORPHONE HCL 2 MG/ML
VIAL (ML) INJECTION
Status: DISCONTINUED | OUTPATIENT
Start: 2020-08-18 | End: 2020-08-18

## 2020-08-18 RX ORDER — OXYCODONE AND ACETAMINOPHEN 5; 325 MG/1; MG/1
1 TABLET ORAL ONCE AS NEEDED
Status: DISCONTINUED | OUTPATIENT
Start: 2020-08-18 | End: 2020-08-18

## 2020-08-18 RX ORDER — KETAMINE HYDROCHLORIDE 100 MG/ML
INJECTION, SOLUTION INTRAMUSCULAR; INTRAVENOUS
Status: DISCONTINUED | OUTPATIENT
Start: 2020-08-18 | End: 2020-08-18

## 2020-08-18 RX ORDER — ONDANSETRON 2 MG/ML
4 INJECTION INTRAMUSCULAR; INTRAVENOUS DAILY PRN
Status: DISCONTINUED | OUTPATIENT
Start: 2020-08-18 | End: 2020-08-20 | Stop reason: HOSPADM

## 2020-08-18 RX ORDER — SUCCINYLCHOLINE CHLORIDE 20 MG/ML
INJECTION INTRAMUSCULAR; INTRAVENOUS
Status: DISCONTINUED | OUTPATIENT
Start: 2020-08-18 | End: 2020-08-18

## 2020-08-18 RX ORDER — ROCURONIUM BROMIDE 10 MG/ML
INJECTION, SOLUTION INTRAVENOUS
Status: DISCONTINUED | OUTPATIENT
Start: 2020-08-18 | End: 2020-08-18

## 2020-08-18 RX ORDER — DEXAMETHASONE SODIUM PHOSPHATE 4 MG/ML
INJECTION, SOLUTION INTRA-ARTICULAR; INTRALESIONAL; INTRAMUSCULAR; INTRAVENOUS; SOFT TISSUE
Status: DISCONTINUED | OUTPATIENT
Start: 2020-08-18 | End: 2020-08-18

## 2020-08-18 RX ORDER — CEFAZOLIN SODIUM 2 G/50ML
2 SOLUTION INTRAVENOUS
Status: COMPLETED | OUTPATIENT
Start: 2020-08-18 | End: 2020-08-18

## 2020-08-18 RX ADMIN — SODIUM CHLORIDE, SODIUM GLUCONATE, SODIUM ACETATE, POTASSIUM CHLORIDE, MAGNESIUM CHLORIDE, SODIUM PHOSPHATE, DIBASIC, AND POTASSIUM PHOSPHATE: .53; .5; .37; .037; .03; .012; .00082 INJECTION, SOLUTION INTRAVENOUS at 04:08

## 2020-08-18 RX ADMIN — ROCURONIUM BROMIDE 40 MG: 10 INJECTION, SOLUTION INTRAVENOUS at 02:08

## 2020-08-18 RX ADMIN — FENTANYL CITRATE 25 MCG: 50 INJECTION, SOLUTION INTRAMUSCULAR; INTRAVENOUS at 05:08

## 2020-08-18 RX ADMIN — PHENYLEPHRINE HYDROCHLORIDE 0.25 MCG/KG/MIN: 10 INJECTION INTRAVENOUS at 02:08

## 2020-08-18 RX ADMIN — OXYCODONE AND ACETAMINOPHEN 1 TABLET: 5; 325 TABLET ORAL at 05:08

## 2020-08-18 RX ADMIN — ONDANSETRON 4 MG: 2 INJECTION, SOLUTION INTRAMUSCULAR; INTRAVENOUS at 02:08

## 2020-08-18 RX ADMIN — MUPIROCIN: 20 OINTMENT TOPICAL at 12:08

## 2020-08-18 RX ADMIN — LIDOCAINE HYDROCHLORIDE 100 MG: 20 INJECTION, SOLUTION INTRAVENOUS at 02:08

## 2020-08-18 RX ADMIN — HYDROMORPHONE HYDROCHLORIDE 0.4 MG: 2 INJECTION INTRAMUSCULAR; INTRAVENOUS; SUBCUTANEOUS at 04:08

## 2020-08-18 RX ADMIN — SODIUM CHLORIDE, SODIUM GLUCONATE, SODIUM ACETATE, POTASSIUM CHLORIDE, MAGNESIUM CHLORIDE, SODIUM PHOSPHATE, DIBASIC, AND POTASSIUM PHOSPHATE: .53; .5; .37; .037; .03; .012; .00082 INJECTION, SOLUTION INTRAVENOUS at 03:08

## 2020-08-18 RX ADMIN — MIDAZOLAM HYDROCHLORIDE 2 MG: 1 INJECTION, SOLUTION INTRAMUSCULAR; INTRAVENOUS at 02:08

## 2020-08-18 RX ADMIN — FENTANYL CITRATE 25 MCG: 50 INJECTION, SOLUTION INTRAMUSCULAR; INTRAVENOUS at 03:08

## 2020-08-18 RX ADMIN — CEFAZOLIN SODIUM 2 G: 2 SOLUTION INTRAVENOUS at 02:08

## 2020-08-18 RX ADMIN — DEXAMETHASONE SODIUM PHOSPHATE 8 MG: 4 INJECTION, SOLUTION INTRA-ARTICULAR; INTRALESIONAL; INTRAMUSCULAR; INTRAVENOUS; SOFT TISSUE at 02:08

## 2020-08-18 RX ADMIN — FENTANYL CITRATE 100 MCG: 50 INJECTION, SOLUTION INTRAMUSCULAR; INTRAVENOUS at 02:08

## 2020-08-18 RX ADMIN — SODIUM CHLORIDE, SODIUM GLUCONATE, SODIUM ACETATE, POTASSIUM CHLORIDE, MAGNESIUM CHLORIDE, SODIUM PHOSPHATE, DIBASIC, AND POTASSIUM PHOSPHATE: .53; .5; .37; .037; .03; .012; .00082 INJECTION, SOLUTION INTRAVENOUS at 12:08

## 2020-08-18 RX ADMIN — PHENYLEPHRINE HYDROCHLORIDE 80 MCG: 10 INJECTION INTRAVENOUS at 03:08

## 2020-08-18 RX ADMIN — ROCURONIUM BROMIDE 10 MG: 10 INJECTION, SOLUTION INTRAVENOUS at 02:08

## 2020-08-18 RX ADMIN — SUCCINYLCHOLINE CHLORIDE 120 MG: 20 INJECTION, SOLUTION INTRAMUSCULAR; INTRAVENOUS; PARENTERAL at 02:08

## 2020-08-18 RX ADMIN — NEOSTIGMINE METHYLSULFATE 5 MG: 1 INJECTION INTRAVENOUS at 03:08

## 2020-08-18 RX ADMIN — PROPOFOL 200 MG: 10 INJECTION, EMULSION INTRAVENOUS at 02:08

## 2020-08-18 RX ADMIN — KETAMINE HYDROCHLORIDE 30 MG: 100 INJECTION, SOLUTION, CONCENTRATE INTRAMUSCULAR; INTRAVENOUS at 02:08

## 2020-08-18 RX ADMIN — LIDOCAINE HYDROCHLORIDE 10 MG: 10 INJECTION, SOLUTION EPIDURAL; INFILTRATION; INTRACAUDAL; PERINEURAL at 12:08

## 2020-08-18 RX ADMIN — ESMOLOL HYDROCHLORIDE 20 MG: 20 INJECTION INTRAVENOUS at 03:08

## 2020-08-18 RX ADMIN — HYDROMORPHONE HYDROCHLORIDE 0.2 MG: 2 INJECTION INTRAMUSCULAR; INTRAVENOUS; SUBCUTANEOUS at 05:08

## 2020-08-18 RX ADMIN — GLYCOPYRROLATE 0.6 MG: 0.2 INJECTION, SOLUTION INTRAMUSCULAR; INTRAVENOUS at 03:08

## 2020-08-18 RX ADMIN — FENTANYL CITRATE 50 MCG: 50 INJECTION, SOLUTION INTRAMUSCULAR; INTRAVENOUS at 02:08

## 2020-08-18 NOTE — PLAN OF CARE
Pre op assessment performed and questions answered.  Clothing and partial denture, cell phone given to mother.  OK to discuss medical information w/mother today

## 2020-08-18 NOTE — TRANSFER OF CARE
"Anesthesia Transfer of Care Note    Patient: Owen Gonzalez    Procedure(s) Performed: Procedure(s) (LRB):  ORIF, FRACTURE, CLAVICLE (Right)    Patient location: PACU    Anesthesia Type: general    Transport from OR: Transported from OR on 6-10 L/min O2 by face mask with adequate spontaneous ventilation    Post pain: adequate analgesia    Post assessment: tolerated procedure well and no apparent anesthetic complications    Post vital signs: stable    Level of consciousness: sedated    Nausea/Vomiting: no nausea/vomiting    Complications: none    Transfer of care protocol was followed      Last vitals:   Visit Vitals  /80   Pulse 78   Temp 36.6 °C (97.9 °F) (Skin)   Resp 12   Ht 5' 8.5" (1.74 m)   Wt 98.4 kg (217 lb)   SpO2 98%   BMI 32.51 kg/m²     "

## 2020-08-18 NOTE — PLAN OF CARE
Checked on pt- pt's mother was eating a bag of chips asked if pt ate any- stated he did not eat any. Dr. Adkins came in & marked pt. verified consents and H&P done.

## 2020-08-18 NOTE — DISCHARGE INSTRUCTIONS
"Discharge Instructions: After Your Surgery/Procedure  Youve just had surgery. During surgery you were given medicine called anesthesia to keep you relaxed and free of pain. After surgery you may have some pain or nausea. This is common. Here are some tips for feeling better and getting well after surgery.     Stay on schedule with your medication.   Going home  Your doctor or nurse will show you how to take care of yourself when you go home. He or she will also answer your questions. Have an adult family member or friend drive you home.      For your safety we recommend these precaution for the first 24 hours after your procedure:  · Do not drive or use heavy equipment.  · Do not make important decisions or sign legal papers.  · Do not drink alcohol.  · Have someone stay with you, if needed. He or she can watch for problems and help keep you safe.  · Your concentration, balance, coordination, and judgement may be impaired for many hours after anesthesia.  Use caution when ambulating or standing up.     · You may feel weak and "washed out" after anesthesia and surgery.      Subtle residual effects of general anesthesia or sedation with regional / local anesthesia can last more than 24 hours.  Rest for the remainder of the day or longer if your Doctor/Surgeon has advised you to do so.  Although you may feel normal within the first 24 hours, your reflexes and mental ability may be impaired without you realizing it.  You may feel dizzy, lightheaded or sleepy for 24 hours or longer.      Be sure to go to all follow-up visits with your doctor. And rest after your surgery for as long as your doctor tells you to.  Coping with pain  If you have pain after surgery, pain medicine will help you feel better. Take it as told, before pain becomes severe. Also, ask your doctor or pharmacist about other ways to control pain. This might be with heat, ice, or relaxation. And follow any other instructions your surgeon or nurse gives " you.  Tips for taking pain medicine  To get the best relief possible, remember these points:  · Pain medicines can upset your stomach. Taking them with a little food may help.  · Most pain relievers taken by mouth need at least 20 to 30 minutes to start to work.  · Taking medicine on a schedule can help you remember to take it. Try to time your medicine so that you can take it before starting an activity. This might be before you get dressed, go for a walk, or sit down for dinner.  · Constipation is a common side effect of pain medicines. Call your doctor before taking any medicines such as laxatives or stool softeners to help ease constipation. Also ask if you should skip any foods. Drinking lots of fluids and eating foods such as fruits and vegetables that are high in fiber can also help. Remember, do not take laxatives unless your surgeon has prescribed them.  · Drinking alcohol and taking pain medicine can cause dizziness and slow your breathing. It can even be deadly. Do not drink alcohol while taking pain medicine.  · Pain medicine can make you react more slowly to things. Do not drive or run machinery while taking pain medicine.  Your health care provider may tell you to take acetaminophen to help ease your pain. Ask him or her how much you are supposed to take each day. Acetaminophen or other pain relievers may interact with your prescription medicines or other over-the-counter (OTC) drugs. Some prescription medicines have acetaminophen and other ingredients. Using both prescription and OTC acetaminophen for pain can cause you to overdose. Read the labels on your OTC medicines with care. This will help you to clearly know the list of ingredients, how much to take, and any warnings. It may also help you not take too much acetaminophen. If you have questions or do not understand the information, ask your pharmacist or health care provider to explain it to you before you take the OTC medicine.  Managing  nausea  Some people have an upset stomach after surgery. This is often because of anesthesia, pain, or pain medicine, or the stress of surgery. These tips will help you handle nausea and eat healthy foods as you get better. If you were on a special food plan before surgery, ask your doctor if you should follow it while you get better. These tips may help:  · Do not push yourself to eat. Your body will tell you when to eat and how much.  · Start off with clear liquids and soup. They are easier to digest.  · Next try semi-solid foods, such as mashed potatoes, applesauce, and gelatin, as you feel ready.  · Slowly move to solid foods. Dont eat fatty, rich, or spicy foods at first.  · Do not force yourself to have 3 large meals a day. Instead eat smaller amounts more often.  · Take pain medicines with a small amount of solid food, such as crackers or toast, to avoid nausea.     Call your surgeon if  · You still have pain an hour after taking medicine. The medicine may not be strong enough.  · You feel too sleepy, dizzy, or groggy. The medicine may be too strong.  · You have side effects like nausea, vomiting, or skin changes, such as rash, itching, or hives.       If you have obstructive sleep apnea  You were given anesthesia medicine during surgery to keep you comfortable and free of pain. After surgery, you may have more apnea spells because of this medicine and other medicines you were given. The spells may last longer than usual.   At home:  · Keep using the continuous positive airway pressure (CPAP) device when you sleep. Unless your health care provider tells you not to, use it when you sleep, day or night. CPAP is a common device used to treat obstructive sleep apnea.  · Talk with your provider before taking any pain medicine, muscle relaxants, or sedatives. Your provider will tell you about the possible dangers of taking these medicines.  © 8516-0702 [employer]. [address] [place] [address]  [place] [address]. All rights reserved. This information is not intended as a substitute for professional medical care. Always follow your healthcare professional's instructions.  Post op instructions for prevention of DVT  What is deep vein thrombosis?  Deep vein thrombosis (DVT) is the medical term for blood clots in the deep veins of the leg.  These blood clots can be dangerous.  A DVT can block a blood vessel and keep blood from getting where it needs to go.  Another problem is that the clot can travel to other parts of the body such as the lungs.  A clot that travels to the lungs is called a pulmonary embolus (PE) and can cause serious problems with breathing which can lead to death.  Am I at risk for DVT/PE?  If you are not very active, you are at risk of DVT.  Anyone confined to bed, sitting for long periods of time, recovering from surgery, etc. increases the risk of DVT.  Other risk factors are cancer diagnosis, certain medications, estrogen replacement in any form,older age, obesity, pregnancy, smoking, history of clotting disorders, and dehydration.  How will I know if I have a DVT?   Swelling in the lower leg   Pain   Warmth, redness, hardness or bulging of the vein  If you have any of these symptoms, call your doctors office right away.  Some people will not have any symptoms until the clot moves to the lungs.  What are the symptoms of a PE?   Panting, shortness of breath, or trouble breathing   Sharp, knife-like chest pain when you breathe   Coughing or coughing up blood   Rapid heartbeat  If you have any of these symptoms or get worse quickly, call 911 for emergency treatment.  How can I prevent a DVT?   Avoid long periods of inactivity and dont cross your legs--get up and walk around every hour or so.   Stay active--walking after surgery is highly encouraged.  This means you should get out of the house and walk in the neighborhood.  Going up and down stairs will not impair healing (unless advised  against such activity by your doctor).     Drink plenty of noncaffeinated, nonalcoholic fluids each day to prevent dehydration.   Wear special support stockings, if they have been advised by your doctor.   If you travel, stop at least once an hour and walk around.   Avoid smoking (assistance with stopping is available through your healthcare provider)  Always notify your doctor if you are not able to follow the post operative instructions that are given to you at the time of discharge.  It may be necessary to prescribe one of the medications available to prevent DVT.

## 2020-08-18 NOTE — ANESTHESIA PREPROCEDURE EVALUATION
08/18/2020  Owen Gonzalez is a 35 y.o., male.    Anesthesia Evaluation    I have reviewed the Patient Summary Reports.    I have reviewed the Nursing Notes. I have reviewed the NPO Status.   I have reviewed the Medications.     Review of Systems  Anesthesia Hx:  Denies Family Hx of Anesthesia complications.   Denies Personal Hx of Anesthesia complications.   Pulmonary:   Pneumonia Resolved flu/CAP in March    Probable ADRIAN   Psych:   Psychiatric History          Physical Exam  General:  Obesity    Airway/Jaw/Neck:  Airway Findings: Mouth Opening: Normal Tongue: Normal  General Airway Assessment: Adult  Mallampati: III  TM Distance: Normal, at least 6 cm        Eyes/Ears/Nose:  EYES/EARS/NOSE FINDINGS: Normal   Dental:  Dental Findings:    Chest/Lungs:  Chest/Lungs Findings: Normal Respiratory Rate     Heart/Vascular:  Heart Findings: Rate: Normal  Rhythm: Regular Rhythm        Mental Status:  Mental Status Findings: Normal        Anesthesia Plan  Type of Anesthesia, risks & benefits discussed:  Anesthesia Type:  general  Patient's Preference:   Intra-op Monitoring Plan: standard ASA monitors  Intra-op Monitoring Plan Comments:   Post Op Pain Control Plan: multimodal analgesia  Post Op Pain Control Plan Comments:   Induction:   IV  Beta Blocker:  Patient is not currently on a Beta-Blocker (No further documentation required).       Informed Consent: Patient understands risks and agrees with Anesthesia plan.  Questions answered. Anesthesia consent signed with patient.  ASA Score: 2     Day of Surgery Review of History & Physical:    H&P update referred to the surgeon.         Ready For Surgery From Anesthesia Perspective.

## 2020-08-18 NOTE — PLAN OF CARE
Pt in phase II rec, has right arm in sling with pillow under for support  Bandage on right shoulder dry and intact  Father at bedside explained dc instructions to pt and father both verb understanding  Pt voided x 3 in rec,  Will wheel to car via wheelchair when able

## 2020-08-18 NOTE — PROGRESS NOTES
Mother at bedside, states she is going to stay until pt goes to surgery.  Then she is going home and will return once pt is out of surgery

## 2020-08-18 NOTE — ANESTHESIA PROCEDURE NOTES
Intubation  Performed by: Allen Rodriguez CRNA  Authorized by: Rell Bergeron MD     Intubation:     Attempted By:  CRNA    Difficult Airway Encountered?: No      Complications:  None    Airway Device:  Oral endotracheal tube    Airway Device Size:  7.5    Style/Cuff Inflation:  Cuffed    Inflation Amount (mL):  5    Tube secured:  22    Placement Verified By:  Capnometry    Complicating Factors:  None    Findings Post-Intubation:  BS equal bilateral

## 2020-08-18 NOTE — OP NOTE
[facility]  Surgery Department  Operative Note    SUMMARY     Date of Procedure: 8/18/2020     Procedure: Procedure(s) (LRB):  ORIF, FRACTURE, CLAVICLE (Right)     Surgeon(s) and Role:     * Murtaza Adkins Jr., MD - Primary    Assisting Surgeon:nelly eisenberg    Pre-Operative Diagnosis: Closed displaced fracture of shaft of right clavicle, initial encounter [S42.021A]  Pain of right clavicle [M89.8X1]    Post-Operative Diagnosis: Post-Op Diagnosis Codes:     * Closed displaced fracture of shaft of right clavicle, initial encounter [S42.021A]     * Pain of right clavicle [M89.8X1]    Anesthesia: General    Technical Procedures Used:  Dorsal plate  Description of the Findings of the Procedure:  Patient taken to operating room placed under general anesthesia is then positioned in the chair supine the shoulder and head elevated the arm at the side we then prepped draped right clavicle right side of the chest.  We did not need to manipulate the arm the made a skin incision over the right midshaft clavicle went through skin subcu had access to the clavicle proximally and then worked our way distally with our elevator along the bone to be got the fracture site and then there was callus present now 2-week-old we had mobilized some callus to mobilize the fracture there was a butterfly fragment relatively small also seen we moved out callus we kept it is attached to the soft tissues and kept any butterfly fragments attached to the soft tissue once we had mobilized the fracture on both hands we cut the then see than on the proximal and there was actually a longitudinal split of the fracture so I clamped it together from side to side and put a single screw to hold that.  We then put our plate on top once it was reduced we had Accu Med 8 hole plate we put 3 screws in it but I was not completely satisfied with that look like there was too much curves of the distally we were missing the end of the clavicle as  she had to change plates to different plate now had better fit without 1 hip for holes proximal 4 holes distal aligned along the clavicle much better so we had a combination of nonlocking and locking screws of across our fracture the position of looks quite good on x-ray.  Screw length looked good.  And then a put a 2nd screw from anterior to posterior on the proximal in for that butterfly at the any of the small butterfly was connected the soft tissue was not big enough to screw we then irrigated we then closed our soft tissue envelope with her periosteum and early callus with 2 O Vicryl that came together nicely close subcu with 2 Vicryl close skin with running subcuticular Monocryl and Steri-Strips and supplied a sling  Significant Surgical Tasks Conducted by the Assistant(s), if Applicable:  Exposure of the fracture site and manipulation    Complications:  None    Estimated Blood Loss (EBL): 100 mL           Implants:   Implant Name Type Inv. Item Serial No.  Lot No. LRB No. Used Action   SCREW BNE N ALEJA HEXLB 3.5X14 - [id number]  SCREW BNE N ALEJA HEXLB 3.5X14  [employer]  Right 4 Implanted   SCREW BONE NONLOCK HEX 3.5X10 - [id number]  SCREW BONE NONLOCK HEX 3.5X10  [employer]  Right 1 Implanted and Explanted   SCREW BONE NLHEXALOBE 3.5 X 18 - [id number]  SCREW BONE NLHEXALOBE 3.5 X 18  [employer]  Right 1 Implanted   8 HOLE RIGHT CLAVICLE PLATE    [employer]  Right 1 Implanted and Explanted   8 HOLE PLATE    [employer]  Right 1 Implanted   SCREW BNE ALEJA HEXLB 3.5X14 - [id number]  SCREW BNE ALEJA HEXLB 3.5X14  [employer]  Right 3 Implanted   SCREW BNE ALEJA HEXLB 3.5X12 - [id number]  SCREW BNE ALEJA HEXLB 3.5X12  [employer]  Right 1 Implanted   SCREW BNE N ALEJA HEXLB 3.5X12 - [id number]  SCREW BNE N ALEJA HEXLB 3.5X12  [employer]  Right 1 Implanted   K WIRE .059    [employer]  Right 1 Implanted and Explanted       Specimens:   Specimen (12h ago, onward)    None                  Condition: Good    Disposition:  PACU - hemodynamically stable.    Attestation: I was present and scrubbed for the entire procedure.

## 2020-08-18 NOTE — PLAN OF CARE
Released from Pacu when criteria met pain controlled skin w+d No nausea No emesis dsg dry intact aaox4 encouraged deep breaths Pt has all belongings  Ice pk to r clavicle able to wiggle fingers + radial pulse on right

## 2020-08-18 NOTE — BRIEF OP NOTE
[facility]  Brief Operative Note     SUMMARY     Surgery Date: 8/18/2020     Surgeon(s) and Role:     * Murtaza Adkins Jr., MD - Primary    Assisting Surgeon: None    Pre-op Diagnosis:  Closed displaced fracture of shaft of right clavicle, initial encounter [S42.021A]  Pain of right clavicle [M89.8X1]    Post-op Diagnosis:  Post-Op Diagnosis Codes:     * Closed displaced fracture of shaft of right clavicle, initial encounter [S42.021A]     * Pain of right clavicle [M89.8X1]    Procedure(s) (LRB):  ORIF, FRACTURE, CLAVICLE (Right)    Anesthesia: General    Description of the findings of the procedure:  Dorsal plate    Estimated Blood Loss: 100 mL         Specimens:   Specimen (12h ago, onward)    None          Discharge Note    SUMMARY     Admit Date: 8/18/2020    Discharge Date and Time:  08/18/2020 4:00 PM    Hospital Course (synopsis of major diagnoses, care, treatment, and services provided during the course of the hospital stay): Uneventful Outpatient Surgery     Final Diagnosis: Post-Op Diagnosis Codes:     * Closed displaced fracture of shaft of right clavicle, initial encounter [S42.021A]     * Pain of right clavicle [M89.8X1]    Disposition: Home or Self Care    Follow Up/Patient Instructions:     Medications:  Reconciled Home Medications:   Current Discharge Medication List      CONTINUE these medications which have NOT CHANGED    Details   cyclobenzaprine (FLEXERIL) 10 MG tablet       dextroamphetamine-amphetamine (ADDERALL XR) 30 MG 24 hr capsule Take 1 capsule (30 mg total) by mouth every morning.  Qty: 30 capsule, Refills: 0    Associated Diagnoses: Attention deficit hyperactivity disorder (ADHD), predominantly inattentive type      dextroamphetamine-amphetamine (ADDERALL) 10 mg Tab Take 1 tablet (10 mg total) by mouth once daily.  Qty: 30 tablet, Refills: 0    Associated Diagnoses: Attention deficit hyperactivity disorder (ADHD), predominantly inattentive type       HYDROcodone-acetaminophen (NORCO)  mg per tablet Take 1 tablet by mouth every 6 (six) hours as needed.  Qty: 28 tablet, Refills: 0    Comments: Quantity prescribed more than 7 day supply? No  Associated Diagnoses: Closed displaced fracture of shaft of right clavicle, initial encounter      ibuprofen (ADVIL,MOTRIN) 800 MG tablet       albuterol (PROVENTIL/VENTOLIN HFA) 90 mcg/actuation inhaler Inhale 1-2 puffs into the lungs every 6 (six) hours as needed for Wheezing. Rescue  Qty: 1 g, Refills: 0           Discharge Procedure Orders   SLING ORTHOPEDIC LARGE FOR HOME USE     Diet general     Ice to affected area     Lifting restrictions     No driving, operating heavy equipment or signing legal documents while taking pain medication     Call MD for:  temperature >100.4     Call MD for:  persistent nausea and vomiting     Call MD for:  severe uncontrolled pain     Call MD for:  difficulty breathing, headache or visual disturbances     Call MD for:  redness, tenderness, or signs of infection (pain, swelling, redness, odor or green/yellow discharge around incision site)     Call MD for:  hives     Call MD for:  persistent dizziness or light-headedness     Call MD for:  extreme fatigue     Remove dressing in 72 hours     Follow-up Information     Murtaza Adkins Jr, MD In 2 weeks.    Specialties: Orthopedic Surgery, Surgery  Contact information:  [address] [address]  [phone]

## 2020-08-20 ENCOUNTER — TELEPHONE (OUTPATIENT)
Dept: ORTHOPEDICS | Facility: CLINIC | Age: 35
End: 2020-08-20

## 2020-08-20 VITALS
HEART RATE: 88 BPM | TEMPERATURE: 98 F | BODY MASS INDEX: 32.14 KG/M2 | WEIGHT: 217 LBS | HEIGHT: 69 IN | SYSTOLIC BLOOD PRESSURE: 143 MMHG | OXYGEN SATURATION: 95 % | RESPIRATION RATE: 19 BRPM | DIASTOLIC BLOOD PRESSURE: 86 MMHG

## 2020-08-20 NOTE — ANESTHESIA POSTPROCEDURE EVALUATION
Anesthesia Post Evaluation    Patient: Owen Gonzalez    Procedure(s) Performed: Procedure(s) (LRB):  ORIF, FRACTURE, CLAVICLE (Right)    Final Anesthesia Type: general    Patient location during evaluation: PACU  Patient participation: Yes- Able to Participate  Level of consciousness: awake and alert  Post-procedure vital signs: reviewed and stable  Pain management: adequate  Airway patency: patent    PONV status at discharge: No PONV  Anesthetic complications: no      Cardiovascular status: blood pressure returned to baseline  Respiratory status: unassisted  Hydration status: euvolemic  Follow-up not needed.          Vitals Value Taken Time   /86 08/18/20 1755   Temp 36.7 °C (98 °F) 08/18/20 1749   Pulse 88 08/18/20 1756   Resp 19 08/18/20 1755   SpO2 95 % 08/18/20 1756         Event Time   Out of Recovery 17:38:00         Pain/Amaury Score: No data recorded

## 2020-08-20 NOTE — TELEPHONE ENCOUNTER
----- Message from Berta Dsouza sent at 8/19/2020 11:54 AM CDT -----  Contact: Pt  Pt stated that his employer is asking for his  short term disability and leave of absence paperwork and would like to know when it will be faxed to [phone]  Pt call back # [phone]

## 2020-09-01 ENCOUNTER — TELEPHONE (OUTPATIENT)
Dept: ORTHOPEDICS | Facility: CLINIC | Age: 35
End: 2020-09-01

## 2020-09-01 NOTE — TELEPHONE ENCOUNTER
Do you have a cough? no  Have you had a cough since your surgical procedure ?no  Are you short of breath?no  Have you experienced shortness of breath since your surgical procedure?no  Do you have a fever? no   Did you have a fever since your surgical procedure? no  If yes, what was your temperature n/a  Any redness at the surgery site? yes Warm to the touch? yes ( very little)   Have you been tested for COVID-19 since your surgical procedure? no What was your result? N/A

## 2020-09-02 ENCOUNTER — OFFICE VISIT (OUTPATIENT)
Dept: ORTHOPEDICS | Facility: CLINIC | Age: 35
End: 2020-09-02
Payer: COMMERCIAL

## 2020-09-02 ENCOUNTER — PATIENT MESSAGE (OUTPATIENT)
Dept: FAMILY MEDICINE | Facility: CLINIC | Age: 35
End: 2020-09-02

## 2020-09-02 VITALS
DIASTOLIC BLOOD PRESSURE: 80 MMHG | WEIGHT: 217 LBS | HEART RATE: 80 BPM | HEIGHT: 69 IN | BODY MASS INDEX: 32.14 KG/M2 | SYSTOLIC BLOOD PRESSURE: 119 MMHG

## 2020-09-02 DIAGNOSIS — Z98.890 S/P ORIF (OPEN REDUCTION INTERNAL FIXATION) FRACTURE: Primary | ICD-10-CM

## 2020-09-02 DIAGNOSIS — F90.0 ATTENTION DEFICIT HYPERACTIVITY DISORDER (ADHD), PREDOMINANTLY INATTENTIVE TYPE: ICD-10-CM

## 2020-09-02 DIAGNOSIS — S42.021D CLOSED DISPLACED FRACTURE OF SHAFT OF RIGHT CLAVICLE WITH ROUTINE HEALING, SUBSEQUENT ENCOUNTER: ICD-10-CM

## 2020-09-02 DIAGNOSIS — F90.0 ATTENTION DEFICIT HYPERACTIVITY DISORDER (ADHD), PREDOMINANTLY INATTENTIVE TYPE: Primary | ICD-10-CM

## 2020-09-02 DIAGNOSIS — Z87.81 S/P ORIF (OPEN REDUCTION INTERNAL FIXATION) FRACTURE: Primary | ICD-10-CM

## 2020-09-02 PROCEDURE — 99024 PR POST-OP FOLLOW-UP VISIT: ICD-10-PCS | Mod: S$GLB,,, | Performed by: ORTHOPAEDIC SURGERY

## 2020-09-02 PROCEDURE — 99024 POSTOP FOLLOW-UP VISIT: CPT | Mod: S$GLB,,, | Performed by: ORTHOPAEDIC SURGERY

## 2020-09-02 RX ORDER — DEXTROAMPHETAMINE SACCHARATE, AMPHETAMINE ASPARTATE, DEXTROAMPHETAMINE SULFATE AND AMPHETAMINE SULFATE 2.5; 2.5; 2.5; 2.5 MG/1; MG/1; MG/1; MG/1
10 TABLET ORAL DAILY
Qty: 30 TABLET | Refills: 0 | Status: SHIPPED | OUTPATIENT
Start: 2020-09-02 | End: 2020-09-24 | Stop reason: SDUPTHER

## 2020-09-02 RX ORDER — DEXTROAMPHETAMINE SULFATE, DEXTROAMPHETAMINE SACCHARATE, AMPHETAMINE SULFATE AND AMPHETAMINE ASPARTATE 7.5; 7.5; 7.5; 7.5 MG/1; MG/1; MG/1; MG/1
CAPSULE, EXTENDED RELEASE ORAL
COMMUNITY
Start: 2020-07-10 | End: 2020-09-02 | Stop reason: SDUPTHER

## 2020-09-02 RX ORDER — HYDROCODONE BITARTRATE AND ACETAMINOPHEN 10; 325 MG/1; MG/1
TABLET ORAL
COMMUNITY
Start: 2020-08-28 | End: 2020-09-23

## 2020-09-02 NOTE — PROGRESS NOTES
[facility] ORTHOPEDICS POST-OP NOTE    Subjective:           Chief Complaint:   Chief Complaint   Patient presents with    Right Shoulder - Post-op Evaluation     ORIF right clavicle 8.18.2020. Suture removal States that he still has some soreness and is tender.        Past Medical History:   Diagnosis Date    ADHD        Past Surgical History:   Procedure Laterality Date    ANKLE FRACTURE SURGERY Left 2008    left ankle surgery 2008      OPEN REDUCTION AND INTERNAL FIXATION (ORIF) OF FRACTURE OF CLAVICLE Right 8/18/2020    Procedure: ORIF, FRACTURE, CLAVICLE;  Surgeon: Murtaza Adkins Jr., MD;  Location: [facility];  Service: Orthopedics;  Laterality: Right;  Accumed       Current Outpatient Medications   Medication Sig    ADDERALL XR 30 mg 24 hr capsule     cyclobenzaprine (FLEXERIL) 10 MG tablet     ibuprofen (ADVIL,MOTRIN) 800 MG tablet     HYDROcodone-acetaminophen (NORCO)  mg per tablet TK 1 T PO  Q 6 H PRN     No current facility-administered medications for this visit.        Review of patient's allergies indicates:  No Known Allergies    Family History   Problem Relation Age of Onset    Diabetes Maternal Grandfather        Social History     Socioeconomic History    Marital status: Single     Spouse name: Not on file    Number of children: Not on file    Years of education: Not on file    Highest education level: Not on file   Occupational History    Not on file   Social Needs    Financial resource strain: Not on file    Food insecurity     Worry: Not on file     Inability: Not on file    Transportation needs     Medical: Not on file     Non-medical: Not on file   Tobacco Use    Smoking status: Former Smoker    Smokeless tobacco: Former User     Quit date: 10/1/2015   Substance and Sexual Activity    Alcohol use: Yes     Comment: socially    Drug use: No    Sexual activity: Not on file   Lifestyle    Physical activity     Days per week: Not on file     Minutes per session: Not on  file    Stress: Not on file   Relationships    Social connections     Talks on phone: Not on file     Gets together: Not on file     Attends Pentecostalism service: Not on file     Active member of club or organization: Not on file     Attends meetings of clubs or organizations: Not on file     Relationship status: Not on file   Other Topics Concern    Not on file   Social History Narrative    Not on file       History of present illness:  2 weeks follow-up ORIF of a right clavicle.  He is doing well at this point taking very little meds in out of his sling    Review of Systems:    Musculoskeletal:  See HPI      Objective:        Physical Examination:    Vital Signs:    Vitals:    09/02/20 0956   BP: 119/80   Pulse: 80       Body mass index is 32.51 kg/m².    This a well-developed, well nourished patient in no acute distress.  They are alert and oriented and cooperative to examination.        On of physical exam wound looks good.  He has 90° of forward flexion and abduction without pain.  Lower arm is fine  Pertinent New Results:    XRAY Report / Interpretation:   AP x-ray right clavicle shows a 8 hole plate over mid shaft clavicle fracture with 2 of additional screws for interfragmentary compression.  There is a significant butterfly fragment inferior to the fracture.  Hardware is in good position.  No callus formation visible.  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  He is going to return about another 3 weeks here another of x-rays does not need pain medicine.    This note was created using Dragon voice recognition software that occasionally misinterpreted phrases or words.

## 2020-09-03 RX ORDER — DEXTROAMPHETAMINE SULFATE, DEXTROAMPHETAMINE SACCHARATE, AMPHETAMINE SULFATE AND AMPHETAMINE ASPARTATE 7.5; 7.5; 7.5; 7.5 MG/1; MG/1; MG/1; MG/1
30 CAPSULE, EXTENDED RELEASE ORAL EVERY MORNING
Qty: 30 CAPSULE | Refills: 0 | Status: SHIPPED | OUTPATIENT
Start: 2020-09-03 | End: 2020-09-24

## 2020-09-23 ENCOUNTER — OFFICE VISIT (OUTPATIENT)
Dept: ORTHOPEDICS | Facility: CLINIC | Age: 35
End: 2020-09-23
Payer: COMMERCIAL

## 2020-09-23 VITALS
WEIGHT: 217 LBS | HEIGHT: 69 IN | DIASTOLIC BLOOD PRESSURE: 84 MMHG | HEART RATE: 80 BPM | SYSTOLIC BLOOD PRESSURE: 130 MMHG | BODY MASS INDEX: 32.14 KG/M2

## 2020-09-23 DIAGNOSIS — Z87.81 S/P ORIF (OPEN REDUCTION INTERNAL FIXATION) FRACTURE: Primary | ICD-10-CM

## 2020-09-23 DIAGNOSIS — S42.021D CLOSED DISPLACED FRACTURE OF SHAFT OF RIGHT CLAVICLE WITH ROUTINE HEALING, SUBSEQUENT ENCOUNTER: ICD-10-CM

## 2020-09-23 DIAGNOSIS — Z98.890 S/P ORIF (OPEN REDUCTION INTERNAL FIXATION) FRACTURE: Primary | ICD-10-CM

## 2020-09-23 PROCEDURE — 99024 POSTOP FOLLOW-UP VISIT: CPT | Mod: S$GLB,,, | Performed by: ORTHOPAEDIC SURGERY

## 2020-09-23 PROCEDURE — 99024 PR POST-OP FOLLOW-UP VISIT: ICD-10-PCS | Mod: S$GLB,,, | Performed by: ORTHOPAEDIC SURGERY

## 2020-09-23 NOTE — PROGRESS NOTES
[facility] ORTHOPEDICS POST-OP NOTE    Subjective:           Chief Complaint:   Chief Complaint   Patient presents with    Right Shoulder - Post-op Evaluation     ORIF right clavicle 8.18.2020. States that he gets a stiffness/sorness feeling at times and states that it is worse in the AM and some times at night        Past Medical History:   Diagnosis Date    ADHD        Past Surgical History:   Procedure Laterality Date    ANKLE FRACTURE SURGERY Left 2008    left ankle surgery 2008      OPEN REDUCTION AND INTERNAL FIXATION (ORIF) OF FRACTURE OF CLAVICLE Right 8/18/2020    Procedure: ORIF, FRACTURE, CLAVICLE;  Surgeon: Murtaza Adkins Jr., MD;  Location: [facility];  Service: Orthopedics;  Laterality: Right;  Accumed       Current Outpatient Medications   Medication Sig    ADDERALL XR 30 mg 24 hr capsule Take 1 capsule (30 mg total) by mouth every morning.    cyclobenzaprine (FLEXERIL) 10 MG tablet     dextroamphetamine-amphetamine (ADDERALL) 10 mg Tab Take 1 tablet (10 mg total) by mouth once daily.    ibuprofen (ADVIL,MOTRIN) 800 MG tablet      No current facility-administered medications for this visit.        Review of patient's allergies indicates:  No Known Allergies    Family History   Problem Relation Age of Onset    Diabetes Maternal Grandfather        Social History     Socioeconomic History    Marital status: Single     Spouse name: Not on file    Number of children: Not on file    Years of education: Not on file    Highest education level: Not on file   Occupational History    Not on file   Social Needs    Financial resource strain: Not on file    Food insecurity     Worry: Not on file     Inability: Not on file    Transportation needs     Medical: Not on file     Non-medical: Not on file   Tobacco Use    Smoking status: Former Smoker    Smokeless tobacco: Former User     Quit date: 10/1/2015   Substance and Sexual Activity    Alcohol use: Yes     Comment: socially    Drug use: No     Sexual activity: Not on file   Lifestyle    Physical activity     Days per week: Not on file     Minutes per session: Not on file    Stress: Not on file   Relationships    Social connections     Talks on phone: Not on file     Gets together: Not on file     Attends Yazidi service: Not on file     Active member of club or organization: Not on file     Attends meetings of clubs or organizations: Not on file     Relationship status: Not on file   Other Topics Concern    Not on file   Social History Narrative    Not on file       History of present illness:  Follow-up on right clavicle fracture.  He is about 6 weeks.  He is having very little discomfort.    Review of Systems:    Musculoskeletal:  See HPI      Objective:        Physical Examination:    Vital Signs:    Vitals:    09/23/20 0838   BP: 130/84   Pulse: 80       Body mass index is 32.51 kg/m².    This a well-developed, well nourished patient in no acute distress.  They are alert and oriented and cooperative to examination.        So physical exam shows that the right clavicle wound looks good.  He has got good range of motion the shoulder is got no tenderness over the fracture site nor any pain with motion of the right shoulder.  Pertinent New Results:    XRAY Report / Interpretation:   AP and oblique of the right clavicle shows the this a mid shaft clavicle fracture 8 hole plate the hardware has not changed position the reduction is good there is some comminution at the fracture site with a butterfly fragment visible there is bridging callus inferior and slight defect superior at the fracture site.  The callus is progressed since prior x-ray a month ago.  It is not mature callus yet.  Position is excellent Electronically Signed By Murtaza Adkins JR, MD    Assessment/Plan:      So plan he is fit for light duty at this point we can do anything real heavy work.  We need another 4-6 weeks the past for the rest of the healing to occur.  Does not need  therapy does not need a sling.  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

## 2020-09-23 NOTE — LETTER
September 23, 2020                 [facility]  [address]  [place] [address]  Phone: [phone]  Fax: [phone] September 23, 2020     Patient: Owen Gonzalez    YOB: 1985   Date of Visit: 9/23/2020       To Human Resources:    It is my medical opinion that Owen Gonzalez is fit for light duty only for the next 4 weeks.        Sincerely,        Murtaza Adkins Jr, MD

## 2020-09-24 ENCOUNTER — OFFICE VISIT (OUTPATIENT)
Dept: FAMILY MEDICINE | Facility: CLINIC | Age: 35
End: 2020-09-24
Payer: COMMERCIAL

## 2020-09-24 VITALS
TEMPERATURE: 98 F | BODY MASS INDEX: 31.99 KG/M2 | HEIGHT: 69 IN | HEART RATE: 80 BPM | DIASTOLIC BLOOD PRESSURE: 86 MMHG | SYSTOLIC BLOOD PRESSURE: 124 MMHG | WEIGHT: 216 LBS

## 2020-09-24 DIAGNOSIS — F90.0 ATTENTION DEFICIT HYPERACTIVITY DISORDER (ADHD), PREDOMINANTLY INATTENTIVE TYPE: ICD-10-CM

## 2020-09-24 DIAGNOSIS — Z23 NEED FOR INFLUENZA VACCINATION: Primary | ICD-10-CM

## 2020-09-24 PROCEDURE — 90682 FLU VACCINE - QUADRIVALENT (RECOMBINANT) PRESERVATIVE FREE: ICD-10-PCS | Mod: S$GLB,,, | Performed by: PHYSICIAN ASSISTANT

## 2020-09-24 PROCEDURE — 3008F BODY MASS INDEX DOCD: CPT | Mod: S$GLB,,, | Performed by: PHYSICIAN ASSISTANT

## 2020-09-24 PROCEDURE — 99213 OFFICE O/P EST LOW 20 MIN: CPT | Mod: 25,S$GLB,, | Performed by: PHYSICIAN ASSISTANT

## 2020-09-24 PROCEDURE — 90471 FLU VACCINE - QUADRIVALENT (RECOMBINANT) PRESERVATIVE FREE: ICD-10-PCS | Mod: S$GLB,,, | Performed by: PHYSICIAN ASSISTANT

## 2020-09-24 PROCEDURE — 90471 IMMUNIZATION ADMIN: CPT | Mod: S$GLB,,, | Performed by: PHYSICIAN ASSISTANT

## 2020-09-24 PROCEDURE — 99213 PR OFFICE/OUTPT VISIT, EST, LEVL III, 20-29 MIN: ICD-10-PCS | Mod: 25,S$GLB,, | Performed by: PHYSICIAN ASSISTANT

## 2020-09-24 PROCEDURE — 90682 RIV4 VACC RECOMBINANT DNA IM: CPT | Mod: S$GLB,,, | Performed by: PHYSICIAN ASSISTANT

## 2020-09-24 PROCEDURE — 3008F PR BODY MASS INDEX (BMI) DOCUMENTED: ICD-10-PCS | Mod: S$GLB,,, | Performed by: PHYSICIAN ASSISTANT

## 2020-09-24 RX ORDER — DEXTROAMPHETAMINE SACCHARATE, AMPHETAMINE ASPARTATE, DEXTROAMPHETAMINE SULFATE AND AMPHETAMINE SULFATE 2.5; 2.5; 2.5; 2.5 MG/1; MG/1; MG/1; MG/1
10 TABLET ORAL DAILY
Qty: 30 TABLET | Refills: 0 | Status: SHIPPED | OUTPATIENT
Start: 2020-09-24 | End: 2020-12-08 | Stop reason: SDUPTHER

## 2020-09-24 RX ORDER — DEXTROAMPHETAMINE SACCHARATE, AMPHETAMINE ASPARTATE MONOHYDRATE, DEXTROAMPHETAMINE SULFATE AND AMPHETAMINE SULFATE 6.25; 6.25; 6.25; 6.25 MG/1; MG/1; MG/1; MG/1
25 CAPSULE, EXTENDED RELEASE ORAL EVERY MORNING
Qty: 30 CAPSULE | Refills: 0 | Status: SHIPPED | OUTPATIENT
Start: 2020-09-24 | End: 2020-10-24

## 2020-09-24 NOTE — PROGRESS NOTES
SUBJECTIVE:    Patient ID: Owen Gonzalez is a 35 y.o. male.    Chief Complaint: Follow-up (ADHD, no bottles, went over meds verbally, wants FLU shot// SW)    36 yo male presents for regular checkup and refills. Reports that his most recent issue has been related to RT clavicular fracture. Can see details of this in recent ortho records in epic. ORIF mid last month with Dr. Adkins and currently about 6 weeks out form returning to full duty. He does feel that the 30mg adderall dose is a bit strong for him. Finds worsening HA and issues coming off of the medication. Interested in lowering the dose.      Admission on 08/18/2020, Discharged on 08/18/2020   Component Date Value Ref Range Status    SARS-CoV-2 RNA, Amplification, Qual 08/18/2020 Negative  Negative Final   Hospital Outpatient Visit on 08/17/2020   Component Date Value Ref Range Status    WBC 08/17/2020 9.24  3.90 - 12.70 K/uL Final    RBC 08/17/2020 4.71  4.60 - 6.20 M/uL Final    Hemoglobin 08/17/2020 15.2  14.0 - 18.0 g/dL Final    Hematocrit 08/17/2020 47.3  40.0 - 54.0 % Final    Mean Corpuscular Volume 08/17/2020 100* 82 - 98 fL Final    Mean Corpuscular Hemoglobin 08/17/2020 32.3* 27.0 - 31.0 pg Final    Mean Corpuscular Hemoglobin Conc 08/17/2020 32.1  32.0 - 36.0 g/dL Final    RDW 08/17/2020 12.1  11.5 - 14.5 % Final    Platelets 08/17/2020 205  150 - 350 K/uL Final    MPV 08/17/2020 9.7  9.2 - 12.9 fL Final    Immature Granulocytes 08/17/2020 1.2* 0.0 - 0.5 % Final    Gran # (ANC) 08/17/2020 6.1  1.8 - 7.7 K/uL Final    Immature Grans (Abs) 08/17/2020 0.11* 0.00 - 0.04 K/uL Final    Lymph # 08/17/2020 2.1  1.0 - 4.8 K/uL Final    Mono # 08/17/2020 0.7  0.3 - 1.0 K/uL Final    Eos # 08/17/2020 0.2  0.0 - 0.5 K/uL Final    Baso # 08/17/2020 0.05  0.00 - 0.20 K/uL Final    nRBC 08/17/2020 0  0 /100 WBC Final    Gran% 08/17/2020 65.8  38.0 - 73.0 % Final    Lymph% 08/17/2020 22.7  18.0 - 48.0 % Final    Mono% 08/17/2020 7.6   4.0 - 15.0 % Final    Eosinophil% 08/17/2020 2.2  0.0 - 8.0 % Final    Basophil% 08/17/2020 0.5  0.0 - 1.9 % Final    Differential Method 08/17/2020 Automated   Final    Sodium 08/17/2020 140  136 - 145 mmol/L Final    Potassium 08/17/2020 3.8  3.5 - 5.1 mmol/L Final    Chloride 08/17/2020 103  95 - 110 mmol/L Final    CO2 08/17/2020 27  23 - 29 mmol/L Final    Glucose 08/17/2020 99  70 - 110 mg/dL Final    BUN, Bld 08/17/2020 12  6 - 20 mg/dL Final    Creatinine 08/17/2020 1.0  0.5 - 1.4 mg/dL Final    Calcium 08/17/2020 9.3  8.7 - 10.5 mg/dL Final    Total Protein 08/17/2020 8.2  6.0 - 8.4 g/dL Final    Albumin 08/17/2020 4.2  3.5 - 5.2 g/dL Final    Total Bilirubin 08/17/2020 0.7  0.1 - 1.0 mg/dL Final    Alkaline Phosphatase 08/17/2020 79  55 - 135 U/L Final    AST 08/17/2020 50* 10 - 40 U/L Final    ALT 08/17/2020 88* 10 - 44 U/L Final    Anion Gap 08/17/2020 10  8 - 16 mmol/L Final    eGFR if African American 08/17/2020 >60  >60 mL/min/1.73 m^2 Final    eGFR if non African American 08/17/2020 >60  >60 mL/min/1.73 m^2 Final       Past Medical History:   Diagnosis Date    ADHD      Past Surgical History:   Procedure Laterality Date    ANKLE FRACTURE SURGERY Left 2008    left ankle surgery 2008      OPEN REDUCTION AND INTERNAL FIXATION (ORIF) OF FRACTURE OF CLAVICLE Right 8/18/2020    Procedure: ORIF, FRACTURE, CLAVICLE;  Surgeon: Murtaza Adkins Jr., MD;  Location: [facility];  Service: Orthopedics;  Laterality: Right;  Accumed     Family History   Problem Relation Age of Onset    Diabetes Maternal Grandfather        Marital Status: Single  Alcohol History:  reports current alcohol use.  Tobacco History:  reports that he has quit smoking. He quit smokeless tobacco use about 4 years ago.  Drug History:  reports no history of drug use.    Review of patient's allergies indicates:  No Known Allergies    Current Outpatient Medications:     cyclobenzaprine (FLEXERIL) 10 MG tablet, , Disp: , Rfl:  "    dextroamphetamine-amphetamine (ADDERALL) 10 mg Tab, Take 1 tablet (10 mg total) by mouth once daily., Disp: 30 tablet, Rfl: 0    ibuprofen (ADVIL,MOTRIN) 800 MG tablet, , Disp: , Rfl:     dextroamphetamine-amphetamine (ADDERALL XR) 25 MG 24 hr capsule, Take 1 capsule (25 mg total) by mouth every morning., Disp: 30 capsule, Rfl: 0    Review of Systems   Constitutional: Negative for activity change, fatigue, fever and unexpected weight change.   HENT: Negative for congestion.    Respiratory: Negative for apnea, cough, chest tightness and shortness of breath.    Cardiovascular: Negative for chest pain and palpitations.   Gastrointestinal: Negative for abdominal distention and abdominal pain.   Genitourinary: Negative for difficulty urinating and dysuria.   Musculoskeletal: Negative for arthralgias and back pain.   Neurological: Negative for dizziness and weakness.          Objective:      Vitals:    09/24/20 0913   BP: 124/86   Pulse: 80   Temp: 98.4 °F (36.9 °C)   Weight: 98 kg (216 lb)   Height: 5' 8.5" (1.74 m)     Physical Exam  Constitutional:       General: He is not in acute distress.     Appearance: He is well-developed.   HENT:      Head: Normocephalic and atraumatic.   Eyes:      Pupils: Pupils are equal, round, and reactive to light.   Neck:      Musculoskeletal: Normal range of motion and neck supple.      Thyroid: No thyromegaly.   Cardiovascular:      Rate and Rhythm: Normal rate and regular rhythm.      Heart sounds: Normal heart sounds.   Pulmonary:      Effort: Pulmonary effort is normal.      Breath sounds: Normal breath sounds.   Abdominal:      General: Bowel sounds are normal. There is no distension.      Palpations: Abdomen is soft.      Tenderness: There is no abdominal tenderness.   Musculoskeletal: Normal range of motion.   Skin:     General: Skin is warm and dry.      Findings: No erythema or rash.   Neurological:      Mental Status: He is alert and oriented to person, place, and time. "      Cranial Nerves: No cranial nerve deficit.           Assessment:       1. Need for influenza vaccination    2. Attention deficit hyperactivity disorder (ADHD), predominantly inattentive type         Plan:       Need for influenza vaccination  -     Influenza - Quadrivalent (Recombinant) (PF)    Attention deficit hyperactivity disorder (ADHD), predominantly inattentive type  Comments:  Lower to 25mg XR in am and will let me know how he does. Consider further lowering if needed  Orders:  -     dextroamphetamine-amphetamine (ADDERALL XR) 25 MG 24 hr capsule; Take 1 capsule (25 mg total) by mouth every morning.  Dispense: 30 capsule; Refill: 0  -     dextroamphetamine-amphetamine (ADDERALL) 10 mg Tab; Take 1 tablet (10 mg total) by mouth once daily.  Dispense: 30 tablet; Refill: 0      Follow up in about 3 months (around 12/24/2020) for adhd f/u.        9/24/2020 Johnathan Gill PA-C

## 2020-09-24 NOTE — PATIENT INSTRUCTIONS
Attention Deficit/Hyperactivity Disorder (ADHD, ADD) in Adults  Youve always had trouble concentrating. Your mind wanders, and its hard to finish tasks. As a result, you didnt do well in school. And now, you often struggle with your job. Sometimes this makes you clark or depressed. These may be symptoms of attention deficit  hyperactivity disorder (ADHD) or may still be referred to as attention deficit disorder (ADD). To find out more, talk to your healthcare provider. He or she can offer guidance and support.  Symptoms  of ADD in adults  For an adult to be diagnosed with ADHD, the symptoms must have been present since childhood. The symptoms may include:  · Trouble thinking things through  · Low self-esteem  · Depression  · Trouble holding a job  · Memory problems  · Problems with a marriage or relationship  · Lack of discipline   What is ADHD?  Attention deficit hyperactivity disorder makes it hard to focus your mind. You may daydream a lot. And you may be restless much of the time. As a result, you may have trouble with detailed or boring work. And it may be hard to stick with one project for very long. You also may forget things. Or, you may miss key points during a lecture or meeting. You may even have trouble sitting through a movie or concert. At times, you may feel frustrated or angry. This can affect your relationships with others.  Who does it affect?  ADHD starts in childhood. Sometimes, your symptoms may improve as you get older. But they also may persist into your adult years. ADHD is often thought of as a kids problem. Thats why its often missed in adults. In fact, many parents learn they have ADHD when their children are diagnosed.  What causes it?  The exact cause of ADHD isnt known. The disorder does run in families. Having one parent with ADHD makes it more likely youll have it too. And the part of your brain that controls attention may be involved. Certain brain chemicals that are out  of balance may also play a role.  What can be done?  The first step is finding out if you really have ADHD. Your doctor will use special guidelines to diagnose the disorder. Most adults with ADHD are greatly helped by therapy and coaching. In some cases, your doctor may also prescribe medicine to ease your symptoms.  Resources  · National Resource Center on AD/HDwww.rgcw3oito.org  · Attention Deficit Disorder Associationwww.add.org   Date Last Reviewed: 1/1/2017 © 2000-2017 [employer]. [address] [place] [address], [place] [address]. All rights reserved. This information is not intended as a substitute for professional medical care. Always follow your healthcare professional's instructions.

## 2020-10-21 ENCOUNTER — OFFICE VISIT (OUTPATIENT)
Dept: ORTHOPEDICS | Facility: CLINIC | Age: 35
End: 2020-10-21
Payer: COMMERCIAL

## 2020-10-21 VITALS
DIASTOLIC BLOOD PRESSURE: 90 MMHG | HEIGHT: 69 IN | HEART RATE: 93 BPM | SYSTOLIC BLOOD PRESSURE: 144 MMHG | BODY MASS INDEX: 31.99 KG/M2 | WEIGHT: 216 LBS

## 2020-10-21 DIAGNOSIS — Z87.81 S/P ORIF (OPEN REDUCTION INTERNAL FIXATION) FRACTURE: Primary | ICD-10-CM

## 2020-10-21 DIAGNOSIS — S42.021D CLOSED DISPLACED FRACTURE OF SHAFT OF RIGHT CLAVICLE WITH ROUTINE HEALING, SUBSEQUENT ENCOUNTER: ICD-10-CM

## 2020-10-21 DIAGNOSIS — Z98.890 S/P ORIF (OPEN REDUCTION INTERNAL FIXATION) FRACTURE: Primary | ICD-10-CM

## 2020-10-21 PROCEDURE — 99024 POSTOP FOLLOW-UP VISIT: CPT | Mod: S$GLB,,, | Performed by: PHYSICIAN ASSISTANT

## 2020-10-21 PROCEDURE — 99024 PR POST-OP FOLLOW-UP VISIT: ICD-10-PCS | Mod: S$GLB,,, | Performed by: PHYSICIAN ASSISTANT

## 2020-10-21 NOTE — PROGRESS NOTES
[facility]  [address]  [place] [address]  Phone: [phone]   [phone]    Patient's PCP:Johnathan Gill PA-C  Referring Provider: No ref. provider found    POST-OP Note:    Subjective:        Chief Complaint:   Chief Complaint   Patient presents with    Right Shoulder - Post-op Evaluation     ORIF right clavicle 8.18.2020. States that he is doing good, States that he had some stiffness the other day, other than that he is good        Past Medical History:   Diagnosis Date    ADHD        Past Surgical History:   Procedure Laterality Date    ANKLE FRACTURE SURGERY Left 2008    left ankle surgery 2008      OPEN REDUCTION AND INTERNAL FIXATION (ORIF) OF FRACTURE OF CLAVICLE Right 8/18/2020    Procedure: ORIF, FRACTURE, CLAVICLE;  Surgeon: Murtaza Adkins Jr., MD;  Location: [facility];  Service: Orthopedics;  Laterality: Right;  Accumed       Current Outpatient Medications   Medication Sig    dextroamphetamine-amphetamine (ADDERALL XR) 25 MG 24 hr capsule Take 1 capsule (25 mg total) by mouth every morning.    dextroamphetamine-amphetamine (ADDERALL) 10 mg Tab Take 1 tablet (10 mg total) by mouth once daily.     No current facility-administered medications for this visit.        Review of patient's allergies indicates:  No Known Allergies    Family History   Problem Relation Age of Onset    Diabetes Maternal Grandfather        Social History     Socioeconomic History    Marital status: Single     Spouse name: Not on file    Number of children: Not on file    Years of education: Not on file    Highest education level: Not on file   Occupational History    Not on file   Social Needs    Financial resource strain: Not on file    Food insecurity     Worry: Not on file     Inability: Not on file    Transportation needs     Medical: Not on file     Non-medical: Not on file   Tobacco Use    Smoking status: Former Smoker    Smokeless tobacco: Former User     Quit date:  10/1/2015   Substance and Sexual Activity    Alcohol use: Yes     Comment: socially    Drug use: No    Sexual activity: Not on file   Lifestyle    Physical activity     Days per week: Not on file     Minutes per session: Not on file    Stress: Not on file   Relationships    Social connections     Talks on phone: Not on file     Gets together: Not on file     Attends Buddhist service: Not on file     Active member of club or organization: Not on file     Attends meetings of clubs or organizations: Not on file     Relationship status: Not on file   Other Topics Concern    Not on file   Social History Narrative    Not on file       History of present illness:   Patient presents for follow-up of a right clavicle fracture.  ORIF done in August.  We last saw him on September 23rd. Since his last visit he has been doing well.  Minimal pain over the distal clavicle.  No problems with range of motion of the shoulder.      Review of Systems:    Musculoskeletal:  See HPI       Objective:        Physical Examination:    Vital Signs:   Vitals:    10/21/20 0908   BP: (!) 144/90   Pulse: 93       Body mass index is 32.36 kg/m².    This a well-developed, well nourished patient in no acute distress.  They are alert and oriented and cooperative to examination.          Examination of the right clavicle and right shoulder.  Patient has a well-healed incision no evidence of wound failure dehiscence.  No drainage or signs of infection.  No tenderness to palpation or percussion over the wound or the clavicle.  Patient has excellent range of motion of the shoulder with forward flexion to 180°, external rotation to greater than 90°, extension and internal rotation are normal.  No pain with resisted testing of the rotator cuff.    Pertinent New Results:     XRAY Report / Interpretation:   Two views   AP and obliqueof the right clavicle, show a well-healed clavicle fracture with a hardware, plate and screws.  No evidence of hardware  failure or loosening.  Good approximation and alignment.       Assessment:       1. S/P ORIF (open reduction internal fixation) fracture    2. Closed displaced fracture of shaft of right clavicle with routine healing, subsequent encounter      Plan:     S/P ORIF (open reduction internal fixation) fracture  -     X-Ray Clavicle Right    Closed displaced fracture of shaft of right clavicle with routine healing, subsequent encounter  -     X-Ray Clavicle Right        Follow up if symptoms worsen or fail to improve.      Patient is now 12 weeks status post ORIF of a midshaft right clavicle fracture.  He works as a refrigeration repairman, he has been on light duty with restrictions of no heavy lifting greater than 15 lb or overhead work for the last month.  At this time I think he is ready to return to full duty.  Will give him a return to work release dated November 1st.  Will follow him up criselda    [unfilled]  AB Roblero PA-C    This note was created using [employer] voice recognition software that occasionally misinterprets words or phrases.

## 2020-11-03 ENCOUNTER — OFFICE VISIT (OUTPATIENT)
Dept: URGENT CARE | Facility: CLINIC | Age: 35
End: 2020-11-03
Payer: COMMERCIAL

## 2020-11-03 VITALS
TEMPERATURE: 98 F | HEIGHT: 69 IN | HEART RATE: 73 BPM | BODY MASS INDEX: 32.88 KG/M2 | OXYGEN SATURATION: 98 % | WEIGHT: 222 LBS | DIASTOLIC BLOOD PRESSURE: 80 MMHG | SYSTOLIC BLOOD PRESSURE: 130 MMHG

## 2020-11-03 DIAGNOSIS — S30.1XXA ABDOMINAL WALL HEMATOMA, INITIAL ENCOUNTER: Primary | ICD-10-CM

## 2020-11-03 DIAGNOSIS — R10.9 FLANK PAIN: ICD-10-CM

## 2020-11-03 LAB
BILIRUB UR QL STRIP: NEGATIVE
GLUCOSE UR QL STRIP: NEGATIVE
KETONES UR QL STRIP: NEGATIVE
LEUKOCYTE ESTERASE UR QL STRIP: NEGATIVE
PH, POC UA: 6.5
POC BLOOD, URINE: NEGATIVE
POC NITRATES, URINE: NEGATIVE
PROT UR QL STRIP: POSITIVE
SP GR UR STRIP: 1.01 (ref 1–1.03)
UROBILINOGEN UR STRIP-ACNC: POSITIVE (ref 0.3–2.2)

## 2020-11-03 PROCEDURE — 81003 URINALYSIS AUTO W/O SCOPE: CPT | Mod: QW,S$GLB,, | Performed by: NURSE PRACTITIONER

## 2020-11-03 PROCEDURE — 99214 PR OFFICE/OUTPT VISIT, EST, LEVL IV, 30-39 MIN: ICD-10-PCS | Mod: 25,S$GLB,, | Performed by: NURSE PRACTITIONER

## 2020-11-03 PROCEDURE — 81003 POCT URINALYSIS, DIPSTICK, AUTOMATED, W/O SCOPE: ICD-10-PCS | Mod: QW,S$GLB,, | Performed by: NURSE PRACTITIONER

## 2020-11-03 PROCEDURE — 99214 OFFICE O/P EST MOD 30 MIN: CPT | Mod: 25,S$GLB,, | Performed by: NURSE PRACTITIONER

## 2020-11-03 NOTE — PROGRESS NOTES
"Subjective:       Patient ID: Owen Gonzalez is a 35 y.o. male.    Vitals:  height is 5' 9" (1.753 m) and weight is 100.7 kg (222 lb). His oral temperature is 97.6 °F (36.4 °C). His blood pressure is 130/80 and his pulse is 73. His oxygen saturation is 98%.     Chief Complaint: Flank Pain    Pt states "Was cutting down a tree and it fell on his right side causing pain, bruising." incident happened about 4-5 days ago, is improving, wants to make sure its ok    Flank Pain  This is a new problem. The current episode started in the past 7 days. The problem has been gradually worsening since onset. The pain does not radiate. The symptoms are aggravated by bending. Associated symptoms include abdominal pain. Pertinent negatives include no chest pain, dysuria, fever or pelvic pain. He has tried NSAIDs for the symptoms. The treatment provided no relief.       Constitution: Negative for appetite change, chills, sweating and fever.   Cardiovascular: Negative for chest pain.   Respiratory: Negative for shortness of breath.    Gastrointestinal: Positive for abdominal trauma and abdominal pain. Negative for abdominal bloating, history of abdominal surgery, nausea, vomiting, constipation, diarrhea, dark colored stools and heartburn.   Genitourinary: Positive for flank pain. Negative for dysuria and pelvic pain.   Musculoskeletal: Negative for back pain.       Objective:      Physical Exam   Constitutional: He is oriented to person, place, and time. He appears well-developed.   HENT:   Head: Normocephalic and atraumatic.   Ears:   Right Ear: External ear normal.   Left Ear: External ear normal.   Nose: Nose normal.   Mouth/Throat: Mucous membranes are normal.   Eyes: Conjunctivae and lids are normal.   Neck: Trachea normal and full passive range of motion without pain. Neck supple.   Cardiovascular: Normal rate, regular rhythm and normal heart sounds.   Pulmonary/Chest: Effort normal and breath sounds normal. No respiratory " distress.   Abdominal: Soft. Normal appearance and bowel sounds are normal. He exhibits no distension, no abdominal bruit, no pulsatile midline mass and no mass. There is no abdominal tenderness.     flat abdomen  Musculoskeletal: Normal range of motion.   Neurological: He is alert and oriented to person, place, and time. He has normal strength.   Skin: Skin is warm, dry, intact, not diaphoretic and not pale. Psychiatric: His speech is normal and behavior is normal. Judgment and thought content normal.   Nursing note and vitals reviewed.        Assessment:       1. Abdominal wall hematoma, initial encounter    2. Flank pain        Plan:       No concern for intraabdominal process at this time, instructed to use ice, NSAIDs  Abdominal wall hematoma, initial encounter    Flank pain  -     POCT Urinalysis, Dipstick, Automated, W/O Scope

## 2020-11-03 NOTE — PATIENT INSTRUCTIONS
Abdominal Pain    Abdominal pain is pain in the stomach or belly area. Everyone has this pain from time to time. In many cases it goes away on its own. But abdominal pain can sometimes be due to a serious problem, such as appendicitis. So its important to know when to seek help.  Causes of abdominal pain  There are many possible causes of abdominal pain. Common causes in adults include:  · Constipation, diarrhea, or gas  · Stomach acid flowing back up into the esophagus (acid reflux or heartburn)  · Severe acid reflux, called GERD (gastroesophageal reflux disease)  · A sore in the lining of the stomach or small intestine (peptic ulcer)  · Inflammation of the gallbladder, liver, or pancreas  · Gallstones or kidney stones  · Appendicitis   · Intestinal blockage   · An internal organ pushing through a muscle or other tissue (hernia)  · Urinary tract infections  · In women, menstrual cramps, fibroids, or endometriosis  · Inflammation or infection of the intestines  Diagnosing the cause of abdominal pain  Your healthcare provider will do a physical exam help find the cause of your pain. If needed, tests will be ordered. Belly pain has many possible causes. So it can be hard to find the reason for your pain. Giving details about your pain can help. Tell your provider where and when you feel the pain, and what makes it better or worse. Also let your provider know if you have other symptoms such as:  · Fever  · Tiredness  · Upset stomach (nausea)  · Vomiting  · Changes in bathroom habits  Treating abdominal pain  Some causes of pain need emergency medical treatment right away. These include appendicitis or a bowel blockage. Other problems can be treated with rest, fluids, or medicines. Your healthcare provider can give you specific instructions for treatment or self-care based on what is causing your pain.  If you have vomiting or diarrhea, sip water or other clear fluids. When you are ready to eat solid foods again,  start with small amounts of easy-to-digest, low-fat foods. These include apple sauce, toast, or crackers.   When to seek medical care  Call 911 or go to the hospital right away if you:  · Cant pass stool and are vomiting  · Are vomiting blood or have bloody diarrhea or black, tarry diarrhea  · Have chest, neck, or shoulder pain  · Feel like you might pass out  · Have pain in your shoulder blades with nausea  · Have sudden, severe belly pain  · Have new, severe pain unlike any you have felt before  · Have a belly that is rigid, hard, and tender to touch  Call your healthcare provider if you have:  · Pain for more than 5 days  · Bloating for more than 2 days  · Diarrhea for more than 5 days  · A fever of 100.4°F (38.0°C) or higher, or as directed by your provider  · Pain that gets worse  · Weight loss for no reason  · Continued lack of appetite  · Blood in your stool  How to prevent abdominal pain  Here are some tips to help prevent abdominal pain:  · Eat smaller amounts of food at one time.  · Avoid greasy, fried, or other high-fat foods.  · Avoid foods that give you gas.  · Exercise regularly.  · Drink plenty of fluids.  To help prevent GERD symptoms:  · Quit smoking.  · Reduce alcohol and certain foods that increase stomach acid.  · Avoid aspirin and over-the-counter pain and fever medicines (NSAIDS or nonsteroidal anti-inflammatory drugs), if possible  · Lose extra weight.  · Finish eating at least 2 hours before you go to bed or lie down.  · Raise the head of your bed.  Date Last Reviewed: 7/1/2016  © 6547-7843 [employer]. [address] [place] [address], [place] [address]. All rights reserved. This information is not intended as a substitute for professional medical care. Always follow your healthcare professional's instructions.        Bruises (Contusions)    A contusion is a bruise. A bruise happens when a blow to your body doesn't break the skin but does break blood vessels beneath the skin. Blood  leaking from the broken vessels causes redness and swelling. As it heals, your bruise is likely to turn colors like purple, green, and yellow. This is normal. The bruise should fade in 2 or 3 weeks.  Factors that make you more likely to bruise  Almost everyone bruises now and then. Certain people do bruise more easily than others. You're more prone to bruising as you get older. That's because blood vessels become more fragile with age. You're also more likely to bruise if you have a clotting disorder such as hemophilia or take medications that reduce clotting, including aspirin, coumadin, newer agents.  When to go to the emergency room (ER)  Bruises almost always heal on their own without special treatment. But for some people, a bad bruise can be serious. Seek medical care if you:  · Have a clotting disorder such as hemophilia.  · Have cirrhosis or other serious liver disease.  · Take blood-thinning medications such as warfarin (Coumadin).  What to expect in the ER  A doctor will examine your bruise and ask about any health conditions you have. In some cases, you may have a test to check how well your blood clots. Other treatment will depend on your needs.  Follow-up care  Sometimes a bruise gets worse instead of better. It may become larger and more swollen. This can occur when your body walls off a small pool of blood under the skin (hematoma). In very rare cases, your doctor may need to drain excess blood from the area.  Tip:  Apply an ice pack or bag of frozen peas to a bruise (keep a thin cloth between the cold source and your skin). This can help reduce redness and swelling.   Date Last Reviewed: 12/1/2016  © 7627-4509 [employer]. [address] [place] [address], [place] [address]. All rights reserved. This information is not intended as a substitute for professional medical care. Always follow your healthcare professional's instructions.

## 2020-12-08 ENCOUNTER — PATIENT MESSAGE (OUTPATIENT)
Dept: FAMILY MEDICINE | Facility: CLINIC | Age: 35
End: 2020-12-08

## 2020-12-08 DIAGNOSIS — F90.0 ATTENTION DEFICIT HYPERACTIVITY DISORDER (ADHD), PREDOMINANTLY INATTENTIVE TYPE: ICD-10-CM

## 2020-12-08 RX ORDER — DEXTROAMPHETAMINE SACCHARATE, AMPHETAMINE ASPARTATE, DEXTROAMPHETAMINE SULFATE AND AMPHETAMINE SULFATE 2.5; 2.5; 2.5; 2.5 MG/1; MG/1; MG/1; MG/1
10 TABLET ORAL DAILY
Qty: 30 TABLET | Refills: 0 | Status: SHIPPED | OUTPATIENT
Start: 2020-12-08 | End: 2021-01-07

## 2020-12-30 ENCOUNTER — TELEPHONE (OUTPATIENT)
Dept: FAMILY MEDICINE | Facility: CLINIC | Age: 35
End: 2020-12-30

## 2020-12-30 DIAGNOSIS — Z79.899 ENCOUNTER FOR LONG-TERM (CURRENT) USE OF OTHER MEDICATIONS: Primary | ICD-10-CM

## 2021-01-19 ENCOUNTER — TELEPHONE (OUTPATIENT)
Dept: FAMILY MEDICINE | Facility: CLINIC | Age: 36
End: 2021-01-19

## 2021-01-19 RX ORDER — DEXTROAMPHETAMINE SACCHARATE, AMPHETAMINE ASPARTATE MONOHYDRATE, DEXTROAMPHETAMINE SULFATE AND AMPHETAMINE SULFATE 6.25; 6.25; 6.25; 6.25 MG/1; MG/1; MG/1; MG/1
25 CAPSULE, EXTENDED RELEASE ORAL EVERY MORNING
Qty: 30 CAPSULE | Refills: 0 | Status: SHIPPED | OUTPATIENT
Start: 2021-01-19 | End: 2021-01-29 | Stop reason: SDUPTHER

## 2021-01-29 ENCOUNTER — OFFICE VISIT (OUTPATIENT)
Dept: FAMILY MEDICINE | Facility: CLINIC | Age: 36
End: 2021-01-29
Payer: COMMERCIAL

## 2021-01-29 VITALS
WEIGHT: 220 LBS | SYSTOLIC BLOOD PRESSURE: 130 MMHG | BODY MASS INDEX: 32.58 KG/M2 | DIASTOLIC BLOOD PRESSURE: 80 MMHG | HEIGHT: 69 IN | HEART RATE: 60 BPM

## 2021-01-29 DIAGNOSIS — Z51.81 ENCOUNTER FOR THERAPEUTIC DRUG MONITORING: ICD-10-CM

## 2021-01-29 DIAGNOSIS — F90.0 ATTENTION DEFICIT HYPERACTIVITY DISORDER (ADHD), PREDOMINANTLY INATTENTIVE TYPE: ICD-10-CM

## 2021-01-29 DIAGNOSIS — Z79.899 ENCOUNTER FOR LONG-TERM (CURRENT) USE OF OTHER MEDICATIONS: Primary | ICD-10-CM

## 2021-01-29 PROCEDURE — 1125F PR PAIN SEVERITY QUANTIFIED, PAIN PRESENT: ICD-10-PCS | Mod: S$GLB,,, | Performed by: PHYSICIAN ASSISTANT

## 2021-01-29 PROCEDURE — 3008F PR BODY MASS INDEX (BMI) DOCUMENTED: ICD-10-PCS | Mod: S$GLB,,, | Performed by: PHYSICIAN ASSISTANT

## 2021-01-29 PROCEDURE — 99395 PREV VISIT EST AGE 18-39: CPT | Mod: S$GLB,,, | Performed by: PHYSICIAN ASSISTANT

## 2021-01-29 PROCEDURE — 99395 PR PREVENTIVE VISIT,EST,18-39: ICD-10-PCS | Mod: S$GLB,,, | Performed by: PHYSICIAN ASSISTANT

## 2021-01-29 PROCEDURE — 1125F AMNT PAIN NOTED PAIN PRSNT: CPT | Mod: S$GLB,,, | Performed by: PHYSICIAN ASSISTANT

## 2021-01-29 PROCEDURE — 3008F BODY MASS INDEX DOCD: CPT | Mod: S$GLB,,, | Performed by: PHYSICIAN ASSISTANT

## 2021-01-29 RX ORDER — DEXTROAMPHETAMINE SACCHARATE, AMPHETAMINE ASPARTATE, DEXTROAMPHETAMINE SULFATE AND AMPHETAMINE SULFATE 2.5; 2.5; 2.5; 2.5 MG/1; MG/1; MG/1; MG/1
1 TABLET ORAL DAILY
COMMUNITY
Start: 2021-01-19 | End: 2021-01-29 | Stop reason: SDUPTHER

## 2021-01-29 RX ORDER — DEXTROAMPHETAMINE SACCHARATE, AMPHETAMINE ASPARTATE MONOHYDRATE, DEXTROAMPHETAMINE SULFATE AND AMPHETAMINE SULFATE 6.25; 6.25; 6.25; 6.25 MG/1; MG/1; MG/1; MG/1
25 CAPSULE, EXTENDED RELEASE ORAL EVERY MORNING
Qty: 30 CAPSULE | Refills: 0 | Status: SHIPPED | OUTPATIENT
Start: 2021-03-19 | End: 2021-04-18

## 2021-01-29 RX ORDER — DEXTROAMPHETAMINE SACCHARATE, AMPHETAMINE ASPARTATE, DEXTROAMPHETAMINE SULFATE AND AMPHETAMINE SULFATE 2.5; 2.5; 2.5; 2.5 MG/1; MG/1; MG/1; MG/1
1 TABLET ORAL DAILY
Qty: 30 TABLET | Refills: 0 | Status: SHIPPED | OUTPATIENT
Start: 2021-02-19 | End: 2021-03-21

## 2021-01-29 RX ORDER — DEXTROAMPHETAMINE SACCHARATE, AMPHETAMINE ASPARTATE MONOHYDRATE, DEXTROAMPHETAMINE SULFATE AND AMPHETAMINE SULFATE 6.25; 6.25; 6.25; 6.25 MG/1; MG/1; MG/1; MG/1
25 CAPSULE, EXTENDED RELEASE ORAL EVERY MORNING
Qty: 30 CAPSULE | Refills: 0 | Status: SHIPPED | OUTPATIENT
Start: 2021-02-19 | End: 2021-03-21

## 2021-01-29 RX ORDER — DEXTROAMPHETAMINE SACCHARATE, AMPHETAMINE ASPARTATE MONOHYDRATE, DEXTROAMPHETAMINE SULFATE AND AMPHETAMINE SULFATE 6.25; 6.25; 6.25; 6.25 MG/1; MG/1; MG/1; MG/1
25 CAPSULE, EXTENDED RELEASE ORAL EVERY MORNING
Qty: 30 CAPSULE | Refills: 0 | Status: SHIPPED | OUTPATIENT
Start: 2021-04-19 | End: 2021-04-30 | Stop reason: SDUPTHER

## 2021-01-29 RX ORDER — DEXTROAMPHETAMINE SACCHARATE, AMPHETAMINE ASPARTATE, DEXTROAMPHETAMINE SULFATE AND AMPHETAMINE SULFATE 2.5; 2.5; 2.5; 2.5 MG/1; MG/1; MG/1; MG/1
1 TABLET ORAL DAILY
Qty: 30 TABLET | Refills: 0 | Status: SHIPPED | OUTPATIENT
Start: 2021-03-19 | End: 2021-04-18

## 2021-01-29 RX ORDER — DEXTROAMPHETAMINE SACCHARATE, AMPHETAMINE ASPARTATE, DEXTROAMPHETAMINE SULFATE AND AMPHETAMINE SULFATE 2.5; 2.5; 2.5; 2.5 MG/1; MG/1; MG/1; MG/1
1 TABLET ORAL DAILY
Qty: 30 TABLET | Refills: 0 | Status: SHIPPED | OUTPATIENT
Start: 2021-04-19 | End: 2021-04-30 | Stop reason: SDUPTHER

## 2021-02-02 LAB
ALBUMIN SERPL-MCNC: 4.5 G/DL (ref 3.6–5.1)
ALBUMIN/GLOB SERPL: 1.5 (CALC) (ref 1–2.5)
ALP SERPL-CCNC: 70 U/L (ref 36–130)
ALT SERPL-CCNC: 21 U/L (ref 9–46)
AMPHET UR-MCNC: 2725 NG/ML
AMPHETAMINES UR QL: POSITIVE NG/ML
APPEARANCE UR: CLEAR
AST SERPL-CCNC: 18 U/L (ref 10–40)
BACTERIA #/AREA URNS HPF: NORMAL /HPF
BACTERIA UR CULT: NORMAL
BARBITURATES UR QL: NEGATIVE NG/ML
BASOPHILS # BLD AUTO: 50 CELLS/UL (ref 0–200)
BASOPHILS NFR BLD AUTO: 0.7 %
BENZODIAZ UR QL: NEGATIVE NG/ML
BILIRUB SERPL-MCNC: 0.7 MG/DL (ref 0.2–1.2)
BILIRUB UR QL STRIP: NEGATIVE
BUN SERPL-MCNC: 10 MG/DL (ref 7–25)
BUN/CREAT SERPL: NORMAL (CALC) (ref 6–22)
BZE UR QL: NEGATIVE NG/ML
CALCIUM SERPL-MCNC: 9.5 MG/DL (ref 8.6–10.3)
CHLORIDE SERPL-SCNC: 102 MMOL/L (ref 98–110)
CHOLEST SERPL-MCNC: 199 MG/DL
CHOLEST/HDLC SERPL: 3.1 (CALC)
CO2 SERPL-SCNC: 31 MMOL/L (ref 20–32)
COLOR UR: YELLOW
COMMENT: ABNORMAL
CREAT SERPL-MCNC: 1.01 MG/DL (ref 0.6–1.35)
CREAT UR-MCNC: 179.4 MG/DL
DRUG SCREEN COMMENT UR-IMP: ABNORMAL
EOSINOPHIL # BLD AUTO: 178 CELLS/UL (ref 15–500)
EOSINOPHIL NFR BLD AUTO: 2.5 %
ERYTHROCYTE [DISTWIDTH] IN BLOOD BY AUTOMATED COUNT: 11.8 % (ref 11–15)
GFRSERPLBLD MDRD-ARVRAT: 97 ML/MIN/1.73M2
GLOBULIN SER CALC-MCNC: 3.1 G/DL (CALC) (ref 1.9–3.7)
GLUCOSE SERPL-MCNC: 96 MG/DL (ref 65–99)
GLUCOSE UR QL STRIP: NEGATIVE
HCT VFR BLD AUTO: 49.7 % (ref 38.5–50)
HDLC SERPL-MCNC: 65 MG/DL
HGB BLD-MCNC: 16.2 G/DL (ref 13.2–17.1)
HGB UR QL STRIP: NEGATIVE
HYALINE CASTS #/AREA URNS LPF: NORMAL /LPF
KETONES UR QL STRIP: NEGATIVE
LDLC SERPL CALC-MCNC: 120 MG/DL (CALC)
LEUKOCYTE ESTERASE UR QL STRIP: NEGATIVE
LYMPHOCYTES # BLD AUTO: 1874 CELLS/UL (ref 850–3900)
LYMPHOCYTES NFR BLD AUTO: 26.4 %
MCH RBC QN AUTO: 30.5 PG (ref 27–33)
MCHC RBC AUTO-ENTMCNC: 32.6 G/DL (ref 32–36)
MCV RBC AUTO: 93.4 FL (ref 80–100)
MEDICATION PRESCRIBED: ABNORMAL
METHADONE UR QL: NEGATIVE NG/ML
METHAMPHET UR-MCNC: NEGATIVE NG/ML
MONOCYTES # BLD AUTO: 398 CELLS/UL (ref 200–950)
MONOCYTES NFR BLD AUTO: 5.6 %
NEUTROPHILS # BLD AUTO: 4601 CELLS/UL (ref 1500–7800)
NEUTROPHILS NFR BLD AUTO: 64.8 %
NITRITE UR QL STRIP: NEGATIVE
NONHDLC SERPL-MCNC: 134 MG/DL (CALC)
OPIATES UR QL: NEGATIVE NG/ML
OXIDANTS UR QL: NEGATIVE MCG/ML
OXYCODONE UR QL: NEGATIVE NG/ML
PCP UR QL: NEGATIVE NG/ML
PH UR STRIP: 6.5 [PH] (ref 5–8)
PH UR: 5.8 [PH] (ref 4.5–9)
PLATELET # BLD AUTO: 240 THOUSAND/UL (ref 140–400)
PMV BLD REES-ECKER: 10.5 FL (ref 7.5–12.5)
POTASSIUM SERPL-SCNC: 4 MMOL/L (ref 3.5–5.3)
PROT SERPL-MCNC: 7.6 G/DL (ref 6.1–8.1)
PROT UR QL STRIP: NEGATIVE
RBC # BLD AUTO: 5.32 MILLION/UL (ref 4.2–5.8)
RBC #/AREA URNS HPF: NORMAL /HPF
SODIUM SERPL-SCNC: 139 MMOL/L (ref 135–146)
SP GR UR STRIP: 1.02 (ref 1–1.03)
SQUAMOUS #/AREA URNS HPF: NORMAL /HPF
TRIGL SERPL-MCNC: 51 MG/DL
TSH SERPL-ACNC: 1.83 MIU/L (ref 0.4–4.5)
WBC # BLD AUTO: 7.1 THOUSAND/UL (ref 3.8–10.8)
WBC #/AREA URNS HPF: NORMAL /HPF

## 2021-03-19 ENCOUNTER — PATIENT MESSAGE (OUTPATIENT)
Dept: FAMILY MEDICINE | Facility: CLINIC | Age: 36
End: 2021-03-19

## 2021-03-23 ENCOUNTER — PATIENT MESSAGE (OUTPATIENT)
Dept: FAMILY MEDICINE | Facility: CLINIC | Age: 36
End: 2021-03-23

## 2021-04-29 ENCOUNTER — PATIENT MESSAGE (OUTPATIENT)
Dept: FAMILY MEDICINE | Facility: CLINIC | Age: 36
End: 2021-04-29

## 2021-04-29 DIAGNOSIS — F90.0 ATTENTION DEFICIT HYPERACTIVITY DISORDER (ADHD), PREDOMINANTLY INATTENTIVE TYPE: ICD-10-CM

## 2021-04-30 RX ORDER — DEXTROAMPHETAMINE SACCHARATE, AMPHETAMINE ASPARTATE MONOHYDRATE, DEXTROAMPHETAMINE SULFATE AND AMPHETAMINE SULFATE 6.25; 6.25; 6.25; 6.25 MG/1; MG/1; MG/1; MG/1
25 CAPSULE, EXTENDED RELEASE ORAL EVERY MORNING
Qty: 30 CAPSULE | Refills: 0 | Status: SHIPPED | OUTPATIENT
Start: 2021-04-30 | End: 2021-05-30

## 2021-04-30 RX ORDER — DEXTROAMPHETAMINE SACCHARATE, AMPHETAMINE ASPARTATE, DEXTROAMPHETAMINE SULFATE AND AMPHETAMINE SULFATE 2.5; 2.5; 2.5; 2.5 MG/1; MG/1; MG/1; MG/1
1 TABLET ORAL DAILY
Qty: 30 TABLET | Refills: 0 | Status: SHIPPED | OUTPATIENT
Start: 2021-04-30 | End: 2021-07-21 | Stop reason: SDUPTHER

## 2021-05-10 ENCOUNTER — PATIENT MESSAGE (OUTPATIENT)
Dept: RESEARCH | Facility: HOSPITAL | Age: 36
End: 2021-05-10

## 2021-07-21 ENCOUNTER — PATIENT MESSAGE (OUTPATIENT)
Dept: FAMILY MEDICINE | Facility: CLINIC | Age: 36
End: 2021-07-21

## 2021-07-21 DIAGNOSIS — F90.0 ATTENTION DEFICIT HYPERACTIVITY DISORDER (ADHD), PREDOMINANTLY INATTENTIVE TYPE: ICD-10-CM

## 2021-07-21 RX ORDER — DEXTROAMPHETAMINE SACCHARATE, AMPHETAMINE ASPARTATE MONOHYDRATE, DEXTROAMPHETAMINE SULFATE AND AMPHETAMINE SULFATE 7.5; 7.5; 7.5; 7.5 MG/1; MG/1; MG/1; MG/1
30 CAPSULE, EXTENDED RELEASE ORAL EVERY MORNING
Qty: 30 CAPSULE | Refills: 0 | Status: SHIPPED | OUTPATIENT
Start: 2021-07-21 | End: 2021-07-30

## 2021-07-21 RX ORDER — DEXTROAMPHETAMINE SACCHARATE, AMPHETAMINE ASPARTATE, DEXTROAMPHETAMINE SULFATE AND AMPHETAMINE SULFATE 2.5; 2.5; 2.5; 2.5 MG/1; MG/1; MG/1; MG/1
1 TABLET ORAL DAILY
Qty: 30 TABLET | Refills: 0 | Status: SHIPPED | OUTPATIENT
Start: 2021-07-21 | End: 2021-07-30

## 2021-07-30 ENCOUNTER — OFFICE VISIT (OUTPATIENT)
Dept: FAMILY MEDICINE | Facility: CLINIC | Age: 36
End: 2021-07-30
Payer: COMMERCIAL

## 2021-07-30 VITALS
SYSTOLIC BLOOD PRESSURE: 138 MMHG | WEIGHT: 218 LBS | DIASTOLIC BLOOD PRESSURE: 80 MMHG | BODY MASS INDEX: 32.29 KG/M2 | HEART RATE: 76 BPM | HEIGHT: 69 IN

## 2021-07-30 DIAGNOSIS — F90.9 ATTENTION DEFICIT HYPERACTIVITY DISORDER (ADHD), UNSPECIFIED ADHD TYPE: Primary | ICD-10-CM

## 2021-07-30 DIAGNOSIS — R29.818 SUSPECTED SLEEP APNEA: ICD-10-CM

## 2021-07-30 PROCEDURE — 1160F PR REVIEW ALL MEDS BY PRESCRIBER/CLIN PHARMACIST DOCUMENTED: ICD-10-PCS | Mod: S$GLB,,, | Performed by: PHYSICIAN ASSISTANT

## 2021-07-30 PROCEDURE — 99213 OFFICE O/P EST LOW 20 MIN: CPT | Mod: S$GLB,,, | Performed by: PHYSICIAN ASSISTANT

## 2021-07-30 PROCEDURE — 3079F DIAST BP 80-89 MM HG: CPT | Mod: S$GLB,,, | Performed by: PHYSICIAN ASSISTANT

## 2021-07-30 PROCEDURE — 99213 PR OFFICE/OUTPT VISIT, EST, LEVL III, 20-29 MIN: ICD-10-PCS | Mod: S$GLB,,, | Performed by: PHYSICIAN ASSISTANT

## 2021-07-30 PROCEDURE — 3075F SYST BP GE 130 - 139MM HG: CPT | Mod: S$GLB,,, | Performed by: PHYSICIAN ASSISTANT

## 2021-07-30 PROCEDURE — 1160F RVW MEDS BY RX/DR IN RCRD: CPT | Mod: S$GLB,,, | Performed by: PHYSICIAN ASSISTANT

## 2021-07-30 PROCEDURE — 3079F PR MOST RECENT DIASTOLIC BLOOD PRESSURE 80-89 MM HG: ICD-10-PCS | Mod: S$GLB,,, | Performed by: PHYSICIAN ASSISTANT

## 2021-07-30 PROCEDURE — 3008F BODY MASS INDEX DOCD: CPT | Mod: S$GLB,,, | Performed by: PHYSICIAN ASSISTANT

## 2021-07-30 PROCEDURE — 3075F PR MOST RECENT SYSTOLIC BLOOD PRESS GE 130-139MM HG: ICD-10-PCS | Mod: S$GLB,,, | Performed by: PHYSICIAN ASSISTANT

## 2021-07-30 PROCEDURE — 3008F PR BODY MASS INDEX (BMI) DOCUMENTED: ICD-10-PCS | Mod: S$GLB,,, | Performed by: PHYSICIAN ASSISTANT

## 2021-07-30 RX ORDER — DEXTROAMPHETAMINE SACCHARATE, AMPHETAMINE ASPARTATE, DEXTROAMPHETAMINE SULFATE AND AMPHETAMINE SULFATE 5; 5; 5; 5 MG/1; MG/1; MG/1; MG/1
1 TABLET ORAL 2 TIMES DAILY
Qty: 60 TABLET | Refills: 0 | Status: SHIPPED | OUTPATIENT
Start: 2021-07-30 | End: 2021-08-23 | Stop reason: SDUPTHER

## 2021-08-23 ENCOUNTER — PATIENT MESSAGE (OUTPATIENT)
Dept: FAMILY MEDICINE | Facility: CLINIC | Age: 36
End: 2021-08-23

## 2021-08-23 DIAGNOSIS — F90.9 ATTENTION DEFICIT HYPERACTIVITY DISORDER (ADHD), UNSPECIFIED ADHD TYPE: ICD-10-CM

## 2021-08-23 RX ORDER — DEXTROAMPHETAMINE SACCHARATE, AMPHETAMINE ASPARTATE, DEXTROAMPHETAMINE SULFATE AND AMPHETAMINE SULFATE 5; 5; 5; 5 MG/1; MG/1; MG/1; MG/1
1 TABLET ORAL 2 TIMES DAILY
Qty: 60 TABLET | Refills: 0 | Status: SHIPPED | OUTPATIENT
Start: 2021-10-22 | End: 2021-11-12 | Stop reason: SDUPTHER

## 2021-08-23 RX ORDER — DEXTROAMPHETAMINE SACCHARATE, AMPHETAMINE ASPARTATE, DEXTROAMPHETAMINE SULFATE AND AMPHETAMINE SULFATE 5; 5; 5; 5 MG/1; MG/1; MG/1; MG/1
1 TABLET ORAL 2 TIMES DAILY
Qty: 60 TABLET | Refills: 0 | Status: SHIPPED | OUTPATIENT
Start: 2021-09-22 | End: 2021-10-22

## 2021-08-23 RX ORDER — DEXTROAMPHETAMINE SACCHARATE, AMPHETAMINE ASPARTATE, DEXTROAMPHETAMINE SULFATE AND AMPHETAMINE SULFATE 5; 5; 5; 5 MG/1; MG/1; MG/1; MG/1
1 TABLET ORAL 2 TIMES DAILY
Qty: 60 TABLET | Refills: 0 | Status: SHIPPED | OUTPATIENT
Start: 2021-08-23 | End: 2021-09-22

## 2021-11-10 ENCOUNTER — PATIENT MESSAGE (OUTPATIENT)
Dept: FAMILY MEDICINE | Facility: CLINIC | Age: 36
End: 2021-11-10
Payer: COMMERCIAL

## 2021-11-12 ENCOUNTER — OFFICE VISIT (OUTPATIENT)
Dept: FAMILY MEDICINE | Facility: CLINIC | Age: 36
End: 2021-11-12
Payer: COMMERCIAL

## 2021-11-12 VITALS
TEMPERATURE: 98 F | SYSTOLIC BLOOD PRESSURE: 122 MMHG | HEIGHT: 69 IN | DIASTOLIC BLOOD PRESSURE: 74 MMHG | WEIGHT: 213 LBS | BODY MASS INDEX: 31.55 KG/M2 | HEART RATE: 90 BPM

## 2021-11-12 DIAGNOSIS — Z23 NEED FOR INFLUENZA VACCINATION: Primary | ICD-10-CM

## 2021-11-12 DIAGNOSIS — F90.9 ATTENTION DEFICIT HYPERACTIVITY DISORDER (ADHD), UNSPECIFIED ADHD TYPE: ICD-10-CM

## 2021-11-12 PROCEDURE — 99213 OFFICE O/P EST LOW 20 MIN: CPT | Mod: 25,S$GLB,, | Performed by: PHYSICIAN ASSISTANT

## 2021-11-12 PROCEDURE — 3008F BODY MASS INDEX DOCD: CPT | Mod: S$GLB,,, | Performed by: PHYSICIAN ASSISTANT

## 2021-11-12 PROCEDURE — 90471 IMMUNIZATION ADMIN: CPT | Mod: S$GLB,,, | Performed by: PHYSICIAN ASSISTANT

## 2021-11-12 PROCEDURE — 90471 FLU VACCINE - QUADRIVALENT (RECOMBINANT) PRESERVATIVE FREE: ICD-10-PCS | Mod: S$GLB,,, | Performed by: PHYSICIAN ASSISTANT

## 2021-11-12 PROCEDURE — 1160F RVW MEDS BY RX/DR IN RCRD: CPT | Mod: S$GLB,,, | Performed by: PHYSICIAN ASSISTANT

## 2021-11-12 PROCEDURE — 1160F PR REVIEW ALL MEDS BY PRESCRIBER/CLIN PHARMACIST DOCUMENTED: ICD-10-PCS | Mod: S$GLB,,, | Performed by: PHYSICIAN ASSISTANT

## 2021-11-12 PROCEDURE — 3008F PR BODY MASS INDEX (BMI) DOCUMENTED: ICD-10-PCS | Mod: S$GLB,,, | Performed by: PHYSICIAN ASSISTANT

## 2021-11-12 PROCEDURE — 90682 RIV4 VACC RECOMBINANT DNA IM: CPT | Mod: S$GLB,,, | Performed by: PHYSICIAN ASSISTANT

## 2021-11-12 PROCEDURE — 90682 FLU VACCINE - QUADRIVALENT (RECOMBINANT) PRESERVATIVE FREE: ICD-10-PCS | Mod: S$GLB,,, | Performed by: PHYSICIAN ASSISTANT

## 2021-11-12 PROCEDURE — 99213 PR OFFICE/OUTPT VISIT, EST, LEVL III, 20-29 MIN: ICD-10-PCS | Mod: 25,S$GLB,, | Performed by: PHYSICIAN ASSISTANT

## 2021-11-12 RX ORDER — DEXTROAMPHETAMINE SACCHARATE, AMPHETAMINE ASPARTATE, DEXTROAMPHETAMINE SULFATE AND AMPHETAMINE SULFATE 5; 5; 5; 5 MG/1; MG/1; MG/1; MG/1
1 TABLET ORAL 2 TIMES DAILY
Qty: 60 TABLET | Refills: 0 | Status: SHIPPED | OUTPATIENT
Start: 2022-01-12 | End: 2022-03-15 | Stop reason: SDUPTHER

## 2021-11-12 RX ORDER — DEXTROAMPHETAMINE SACCHARATE, AMPHETAMINE ASPARTATE, DEXTROAMPHETAMINE SULFATE AND AMPHETAMINE SULFATE 5; 5; 5; 5 MG/1; MG/1; MG/1; MG/1
1 TABLET ORAL 2 TIMES DAILY
Qty: 60 TABLET | Refills: 0 | Status: SHIPPED | OUTPATIENT
Start: 2021-11-12 | End: 2021-12-12

## 2021-11-12 RX ORDER — DEXTROAMPHETAMINE SACCHARATE, AMPHETAMINE ASPARTATE, DEXTROAMPHETAMINE SULFATE AND AMPHETAMINE SULFATE 5; 5; 5; 5 MG/1; MG/1; MG/1; MG/1
1 TABLET ORAL 2 TIMES DAILY
Qty: 60 TABLET | Refills: 0 | Status: SHIPPED | OUTPATIENT
Start: 2021-12-12 | End: 2022-01-11

## 2021-11-15 ENCOUNTER — PATIENT MESSAGE (OUTPATIENT)
Dept: FAMILY MEDICINE | Facility: CLINIC | Age: 36
End: 2021-11-15
Payer: COMMERCIAL

## 2022-05-06 ENCOUNTER — OFFICE VISIT (OUTPATIENT)
Dept: FAMILY MEDICINE | Facility: CLINIC | Age: 37
End: 2022-05-06
Payer: COMMERCIAL

## 2022-05-06 VITALS
SYSTOLIC BLOOD PRESSURE: 118 MMHG | HEIGHT: 69 IN | DIASTOLIC BLOOD PRESSURE: 82 MMHG | WEIGHT: 225.38 LBS | BODY MASS INDEX: 33.38 KG/M2 | HEART RATE: 80 BPM

## 2022-05-06 DIAGNOSIS — Z79.899 ENCOUNTER FOR LONG-TERM (CURRENT) USE OF OTHER MEDICATIONS: ICD-10-CM

## 2022-05-06 DIAGNOSIS — Z51.81 ENCOUNTER FOR THERAPEUTIC DRUG MONITORING: ICD-10-CM

## 2022-05-06 DIAGNOSIS — F90.9 ATTENTION DEFICIT HYPERACTIVITY DISORDER (ADHD), UNSPECIFIED ADHD TYPE: Primary | ICD-10-CM

## 2022-05-06 PROCEDURE — 3079F DIAST BP 80-89 MM HG: CPT | Mod: CPTII,S$GLB,, | Performed by: PHYSICIAN ASSISTANT

## 2022-05-06 PROCEDURE — 3079F PR MOST RECENT DIASTOLIC BLOOD PRESSURE 80-89 MM HG: ICD-10-PCS | Mod: CPTII,S$GLB,, | Performed by: PHYSICIAN ASSISTANT

## 2022-05-06 PROCEDURE — 1159F PR MEDICATION LIST DOCUMENTED IN MEDICAL RECORD: ICD-10-PCS | Mod: CPTII,S$GLB,, | Performed by: PHYSICIAN ASSISTANT

## 2022-05-06 PROCEDURE — 99395 PREV VISIT EST AGE 18-39: CPT | Mod: S$GLB,,, | Performed by: PHYSICIAN ASSISTANT

## 2022-05-06 PROCEDURE — 3074F PR MOST RECENT SYSTOLIC BLOOD PRESSURE < 130 MM HG: ICD-10-PCS | Mod: CPTII,S$GLB,, | Performed by: PHYSICIAN ASSISTANT

## 2022-05-06 PROCEDURE — 3008F BODY MASS INDEX DOCD: CPT | Mod: CPTII,S$GLB,, | Performed by: PHYSICIAN ASSISTANT

## 2022-05-06 PROCEDURE — 3074F SYST BP LT 130 MM HG: CPT | Mod: CPTII,S$GLB,, | Performed by: PHYSICIAN ASSISTANT

## 2022-05-06 PROCEDURE — 3008F PR BODY MASS INDEX (BMI) DOCUMENTED: ICD-10-PCS | Mod: CPTII,S$GLB,, | Performed by: PHYSICIAN ASSISTANT

## 2022-05-06 PROCEDURE — 99395 PR PREVENTIVE VISIT,EST,18-39: ICD-10-PCS | Mod: S$GLB,,, | Performed by: PHYSICIAN ASSISTANT

## 2022-05-06 PROCEDURE — 1159F MED LIST DOCD IN RCRD: CPT | Mod: CPTII,S$GLB,, | Performed by: PHYSICIAN ASSISTANT

## 2022-05-06 RX ORDER — DEXTROAMPHETAMINE SACCHARATE, AMPHETAMINE ASPARTATE MONOHYDRATE, DEXTROAMPHETAMINE SULFATE AND AMPHETAMINE SULFATE 6.25; 6.25; 6.25; 6.25 MG/1; MG/1; MG/1; MG/1
25 CAPSULE, EXTENDED RELEASE ORAL EVERY MORNING
Qty: 30 CAPSULE | Refills: 0 | Status: SHIPPED | OUTPATIENT
Start: 2022-05-06 | End: 2022-07-11 | Stop reason: SDUPTHER

## 2022-05-06 RX ORDER — DEXTROAMPHETAMINE SACCHARATE, AMPHETAMINE ASPARTATE, DEXTROAMPHETAMINE SULFATE AND AMPHETAMINE SULFATE 2.5; 2.5; 2.5; 2.5 MG/1; MG/1; MG/1; MG/1
10 TABLET ORAL DAILY
Qty: 30 TABLET | Refills: 0 | Status: SHIPPED | OUTPATIENT
Start: 2022-05-06 | End: 2022-06-24 | Stop reason: SDUPTHER

## 2022-05-06 NOTE — PROGRESS NOTES
SUBJECTIVE:    Patient ID: Owen Gonzalez is a 36 y.o. male.    Chief Complaint: Follow-up (No bottles// would like to discuss changing adderall-MJ)    This is a 36-year-old male who presents today for regular ADHD follow-up.  Has been managed with Adderall 20 mg twice a day for at least a few years.  Previously was doing well. Now he has a new job and he works earlier in the morning until later in the evening. Finds that the adderall dosing now does not adequately cover him. Thinks mood may be negatively affected as well.      No visits with results within 6 Month(s) from this visit.   Latest known visit with results is:   Office Visit on 01/29/2021   Component Date Value Ref Range Status    WBC 01/29/2021 7.1  3.8 - 10.8 Thousand/uL Final    RBC 01/29/2021 5.32  4.20 - 5.80 Million/uL Final    Hemoglobin 01/29/2021 16.2  13.2 - 17.1 g/dL Final    Hematocrit 01/29/2021 49.7  38.5 - 50.0 % Final    MCV 01/29/2021 93.4  80.0 - 100.0 fL Final    MCH 01/29/2021 30.5  27.0 - 33.0 pg Final    MCHC 01/29/2021 32.6  32.0 - 36.0 g/dL Final    RDW 01/29/2021 11.8  11.0 - 15.0 % Final    Platelets 01/29/2021 240  140 - 400 Thousand/uL Final    MPV 01/29/2021 10.5  7.5 - 12.5 fL Final    Neutrophils, Abs 01/29/2021 4,601  1,500 - 7,800 cells/uL Final    Lymph # 01/29/2021 1,874  850 - 3,900 cells/uL Final    Mono # 01/29/2021 398  200 - 950 cells/uL Final    Eos # 01/29/2021 178  15 - 500 cells/uL Final    Baso # 01/29/2021 50  0 - 200 cells/uL Final    Neutrophils Relative 01/29/2021 64.8  % Final    Lymph % 01/29/2021 26.4  % Final    Mono % 01/29/2021 5.6  % Final    Eosinophil % 01/29/2021 2.5  % Final    Basophil % 01/29/2021 0.7  % Final    Glucose 01/29/2021 96  65 - 99 mg/dL Final    BUN 01/29/2021 10  7 - 25 mg/dL Final    Creatinine 01/29/2021 1.01  0.60 - 1.35 mg/dL Final    eGFR if non African American 01/29/2021 97  > OR = 60 mL/min/1.73m2 Final    eGFR if   01/29/2021 112  > OR = 60 mL/min/1.73m2 Final    BUN/Creatinine Ratio 01/29/2021 NOT APPLICABLE  6 - 22 (calc) Final    Sodium 01/29/2021 139  135 - 146 mmol/L Final    Potassium 01/29/2021 4.0  3.5 - 5.3 mmol/L Final    Chloride 01/29/2021 102  98 - 110 mmol/L Final    CO2 01/29/2021 31  20 - 32 mmol/L Final    Calcium 01/29/2021 9.5  8.6 - 10.3 mg/dL Final    Total Protein 01/29/2021 7.6  6.1 - 8.1 g/dL Final    Albumin 01/29/2021 4.5  3.6 - 5.1 g/dL Final    Globulin, Total 01/29/2021 3.1  1.9 - 3.7 g/dL (calc) Final    Albumin/Globulin Ratio 01/29/2021 1.5  1.0 - 2.5 (calc) Final    Total Bilirubin 01/29/2021 0.7  0.2 - 1.2 mg/dL Final    Alkaline Phosphatase 01/29/2021 70  36 - 130 U/L Final    AST 01/29/2021 18  10 - 40 U/L Final    ALT 01/29/2021 21  9 - 46 U/L Final    Cholesterol 01/29/2021 199  <200 mg/dL Final    HDL 01/29/2021 65  > OR = 40 mg/dL Final    Triglycerides 01/29/2021 51  <150 mg/dL Final    LDL Cholesterol 01/29/2021 120 (A) mg/dL (calc) Final    HDL/Cholesterol Ratio 01/29/2021 3.1  <5.0 (calc) Final    Non HDL Chol. (LDL+VLDL) 01/29/2021 134 (A) <130 mg/dL (calc) Final    TSH w/reflex to FT4 01/29/2021 1.83  0.40 - 4.50 mIU/L Final    Color, UA 01/29/2021 YELLOW  YELLOW Final    Appearance, UA 01/29/2021 CLEAR  CLEAR Final    Specific Gravity, UA 01/29/2021 1.022  1.001 - 1.035 Final    pH, UA 01/29/2021 6.5  5.0 - 8.0 Final    Glucose, UA 01/29/2021 NEGATIVE  NEGATIVE Final    Bilirubin, UA 01/29/2021 NEGATIVE  NEGATIVE Final    Ketones, UA 01/29/2021 NEGATIVE  NEGATIVE Final    Occult Blood UA 01/29/2021 NEGATIVE  NEGATIVE Final    Protein, UA 01/29/2021 NEGATIVE  NEGATIVE Final    Nitrite, UA 01/29/2021 NEGATIVE  NEGATIVE Final    Leukocytes, UA 01/29/2021 NEGATIVE  NEGATIVE Final    WBC Casts, UA 01/29/2021 NONE SEEN  < OR = 5 /HPF Final    RBC Casts, UA 01/29/2021 NONE SEEN  < OR = 2 /HPF Final    Squam Epithel, UA 01/29/2021 NONE SEEN  < OR = 5 /HPF  Final    Bacteria, UA 01/29/2021 NONE SEEN  NONE SEEN /HPF Final    Hyaline Casts, UA 01/29/2021 NONE SEEN  NONE SEEN /LPF Final    Reflexive Urine Culture 01/29/2021    Final    Prescribed Drug 01/29/2021 Adderall(TM)   Final    Creatinine 01/29/2021 179.4  > or = 20.0 mg/dL Final    pH 01/29/2021 5.8  4.5 - 9.0 Final    Oxidants, Urine (Tox) 01/29/2021 NEGATIVE  <200 mcg/mL Final    Amphetamines 01/29/2021 POSITIVE (A) <500 ng/mL Final    Amphetamine 01/29/2021 2,725 (A) <250 ng/mL Final    Amphetamine 01/29/2021 CONSISTENT   Final    Methamphetamine 01/29/2021 NEGATIVE  <250 ng/mL Final    Methamphetamine 01/29/2021 CONSISTENT   Final    Barbiturates 01/29/2021 NEGATIVE  <300 ng/mL Final    Barbiturates 01/29/2021 CONSISTENT   Final    Benzodiazepines 01/29/2021 NEGATIVE  <100 ng/mL Final    Benzodiazepines 01/29/2021 CONSISTENT   Final    Cocaine Metabolites 01/29/2021 NEGATIVE  <150 ng/mL Final    Cocaine Metabolites 01/29/2021 CONSISTENT   Final    Methadone 01/29/2021 NEGATIVE  <100 ng/mL Final    Methadone 01/29/2021 CONSISTENT   Final    Opiates 01/29/2021 NEGATIVE  <100 ng/mL Final    Opiates 01/29/2021 CONSISTENT   Final    Oxycodone 01/29/2021 NEGATIVE  <100 ng/mL Final    Oxycodone 01/29/2021 CONSISTENT   Final    Phencyclidine 01/29/2021 NEGATIVE  <25 ng/mL Final    Phencyclidine 01/29/2021 CONSISTENT   Final    Comment 01/29/2021    Final       Past Medical History:   Diagnosis Date    ADHD      Past Surgical History:   Procedure Laterality Date    ANKLE FRACTURE SURGERY Left 2008    left ankle surgery 2008      OPEN REDUCTION AND INTERNAL FIXATION (ORIF) OF FRACTURE OF CLAVICLE Right 8/18/2020    Procedure: ORIF, FRACTURE, CLAVICLE;  Surgeon: Murtaza Adkins Jr., MD;  Location: [facility];  Service: Orthopedics;  Laterality: Right;  Accumed     Family History   Problem Relation Age of Onset    Diabetes Maternal Grandfather        Marital Status: Single  Alcohol History:   "reports current alcohol use.  Tobacco History:  reports that he has quit smoking. He quit smokeless tobacco use about 6 years ago.  Drug History:  reports no history of drug use.    Review of patient's allergies indicates:  No Known Allergies    Current Outpatient Medications:     dextroamphetamine-amphetamine (ADDERALL XR) 25 MG 24 hr capsule, Take 1 capsule (25 mg total) by mouth every morning., Disp: 30 capsule, Rfl: 0    dextroamphetamine-amphetamine 10 mg Tab, Take 1 tablet (10 mg total) by mouth once daily at 6am., Disp: 30 tablet, Rfl: 0    Review of Systems   Constitutional: Negative for activity change, fatigue, fever and unexpected weight change.   HENT: Negative for congestion.    Respiratory: Negative for apnea, cough, chest tightness and shortness of breath.    Cardiovascular: Negative for chest pain and palpitations.   Gastrointestinal: Negative for abdominal distention and abdominal pain.   Genitourinary: Negative for difficulty urinating and dysuria.   Musculoskeletal: Negative for arthralgias and back pain.   Neurological: Negative for dizziness and weakness.          Objective:      Vitals:    05/06/22 0743   BP: 118/82   Pulse: 80   Weight: 102.2 kg (225 lb 6.4 oz)   Height: 5' 9" (1.753 m)     Physical Exam  Constitutional:       General: He is not in acute distress.     Appearance: He is well-developed.   HENT:      Head: Normocephalic and atraumatic.   Eyes:      Pupils: Pupils are equal, round, and reactive to light.   Neck:      Thyroid: No thyromegaly.   Cardiovascular:      Rate and Rhythm: Normal rate and regular rhythm.      Heart sounds: Normal heart sounds.   Pulmonary:      Effort: Pulmonary effort is normal.      Breath sounds: Normal breath sounds.   Abdominal:      General: Bowel sounds are normal. There is no distension.      Palpations: Abdomen is soft.      Tenderness: There is no abdominal tenderness.   Musculoskeletal:         General: Normal range of motion.      Cervical " back: Normal range of motion and neck supple.   Skin:     General: Skin is warm and dry.      Findings: No erythema or rash.   Neurological:      Mental Status: He is alert and oriented to person, place, and time.      Cranial Nerves: No cranial nerve deficit.           Assessment:       1. Attention deficit hyperactivity disorder (ADHD), unspecified ADHD type    2. Encounter for long-term (current) use of other medications    3. Encounter for therapeutic drug monitoring         Plan:       Attention deficit hyperactivity disorder (ADHD), unspecified ADHD type  Comments:  Changing dosage form to 25mg XR and 10mg afternoon if needed. Labs today. will let me know about efficacy and can refill if appropriate  Orders:  -     DRUG MONITOR, PANEL 4, W/CONF, URINE; Future; Expected date: 05/06/2022  -     dextroamphetamine-amphetamine (ADDERALL XR) 25 MG 24 hr capsule; Take 1 capsule (25 mg total) by mouth every morning.  Dispense: 30 capsule; Refill: 0  -     dextroamphetamine-amphetamine 10 mg Tab; Take 1 tablet (10 mg total) by mouth once daily at 6am.  Dispense: 30 tablet; Refill: 0    Encounter for long-term (current) use of other medications  -     DRUG MONITOR, PANEL 4, W/CONF, URINE; Future; Expected date: 05/06/2022  -     CBC Auto Differential; Future; Expected date: 05/06/2022  -     Comprehensive Metabolic Panel; Future; Expected date: 05/06/2022  -     Lipid Panel; Future; Expected date: 05/06/2022  -     TSH w/reflex to FT4; Future; Expected date: 05/06/2022  -     Urinalysis, Reflex to Urine Culture Urine, Clean Catch  -     Hepatitis C Antibody; Future; Expected date: 05/06/2022  -     HIV 1/2 Ag/Ab (4th Gen); Future; Expected date: 05/06/2022    Encounter for therapeutic drug monitoring  -     DRUG MONITOR, PANEL 4, W/CONF, URINE; Future; Expected date: 05/06/2022      Follow up in about 6 months (around 11/6/2022).        5/6/2022 Johnathan Gill PA-C

## 2022-05-08 LAB
AMPHET UR-MCNC: 3652 NG/ML
AMPHETAMINES UR QL: POSITIVE NG/ML
BARBITURATES UR QL: NEGATIVE NG/ML
BENZODIAZ UR QL: NEGATIVE NG/ML
BZE UR QL: NEGATIVE NG/ML
CREAT UR-MCNC: 263.4 MG/DL
DRUG SCREEN COMMENT UR-IMP: ABNORMAL
METHADONE UR QL: NEGATIVE NG/ML
METHAMPHET UR-MCNC: NEGATIVE NG/ML
NOTES AND COMMENTS: ABNORMAL
OPIATES UR QL: NEGATIVE NG/ML
OXIDANTS UR QL: NEGATIVE MCG/ML
OXYCODONE UR QL: NEGATIVE NG/ML
PCP UR QL: NEGATIVE NG/ML
PH UR: 6.1 [PH] (ref 4.5–9)

## 2022-05-09 LAB
ALBUMIN SERPL-MCNC: 4.4 G/DL (ref 3.6–5.1)
ALBUMIN/GLOB SERPL: 1.3 (CALC) (ref 1–2.5)
ALP SERPL-CCNC: 67 U/L (ref 36–130)
ALT SERPL-CCNC: 33 U/L (ref 9–46)
AST SERPL-CCNC: 23 U/L (ref 10–40)
BASOPHILS # BLD AUTO: 58 CELLS/UL (ref 0–200)
BASOPHILS NFR BLD AUTO: 0.8 %
BILIRUB SERPL-MCNC: 0.7 MG/DL (ref 0.2–1.2)
BUN SERPL-MCNC: 14 MG/DL (ref 7–25)
BUN/CREAT SERPL: ABNORMAL (CALC) (ref 6–22)
CALCIUM SERPL-MCNC: 9.4 MG/DL (ref 8.6–10.3)
CHLORIDE SERPL-SCNC: 102 MMOL/L (ref 98–110)
CHOLEST SERPL-MCNC: 187 MG/DL
CHOLEST/HDLC SERPL: 2.9 (CALC)
CO2 SERPL-SCNC: 30 MMOL/L (ref 20–32)
CREAT SERPL-MCNC: 0.96 MG/DL (ref 0.6–1.35)
EOSINOPHIL # BLD AUTO: 197 CELLS/UL (ref 15–500)
EOSINOPHIL NFR BLD AUTO: 2.7 %
ERYTHROCYTE [DISTWIDTH] IN BLOOD BY AUTOMATED COUNT: 11.8 % (ref 11–15)
GLOBULIN SER CALC-MCNC: 3.3 G/DL (CALC) (ref 1.9–3.7)
GLUCOSE SERPL-MCNC: 103 MG/DL (ref 65–99)
HCT VFR BLD AUTO: 46.7 % (ref 38.5–50)
HCV AB S/CO SERPL IA: 0.03
HCV AB SERPL QL IA: NORMAL
HDLC SERPL-MCNC: 64 MG/DL
HGB BLD-MCNC: 15.8 G/DL (ref 13.2–17.1)
HIV 1+2 AB+HIV1 P24 AG SERPL QL IA: NORMAL
LDLC SERPL CALC-MCNC: 103 MG/DL (CALC)
LYMPHOCYTES # BLD AUTO: 1862 CELLS/UL (ref 850–3900)
LYMPHOCYTES NFR BLD AUTO: 25.5 %
MCH RBC QN AUTO: 31.9 PG (ref 27–33)
MCHC RBC AUTO-ENTMCNC: 33.8 G/DL (ref 32–36)
MCV RBC AUTO: 94.3 FL (ref 80–100)
MONOCYTES # BLD AUTO: 518 CELLS/UL (ref 200–950)
MONOCYTES NFR BLD AUTO: 7.1 %
NEUTROPHILS # BLD AUTO: 4665 CELLS/UL (ref 1500–7800)
NEUTROPHILS NFR BLD AUTO: 63.9 %
NONHDLC SERPL-MCNC: 123 MG/DL (CALC)
PLATELET # BLD AUTO: 219 THOUSAND/UL (ref 140–400)
PMV BLD REES-ECKER: 9.8 FL (ref 7.5–12.5)
POTASSIUM SERPL-SCNC: 4 MMOL/L (ref 3.5–5.3)
PROT SERPL-MCNC: 7.7 G/DL (ref 6.1–8.1)
RBC # BLD AUTO: 4.95 MILLION/UL (ref 4.2–5.8)
SODIUM SERPL-SCNC: 137 MMOL/L (ref 135–146)
TRIGL SERPL-MCNC: 103 MG/DL
TSH SERPL-ACNC: 1.14 MIU/L (ref 0.4–4.5)
WBC # BLD AUTO: 7.3 THOUSAND/UL (ref 3.8–10.8)

## 2022-06-24 ENCOUNTER — PATIENT MESSAGE (OUTPATIENT)
Dept: FAMILY MEDICINE | Facility: CLINIC | Age: 37
End: 2022-06-24

## 2022-07-11 ENCOUNTER — PATIENT MESSAGE (OUTPATIENT)
Dept: FAMILY MEDICINE | Facility: CLINIC | Age: 37
End: 2022-07-11

## 2022-07-11 DIAGNOSIS — F90.9 ATTENTION DEFICIT HYPERACTIVITY DISORDER (ADHD), UNSPECIFIED ADHD TYPE: ICD-10-CM

## 2022-07-11 RX ORDER — DEXTROAMPHETAMINE SACCHARATE, AMPHETAMINE ASPARTATE MONOHYDRATE, DEXTROAMPHETAMINE SULFATE AND AMPHETAMINE SULFATE 6.25; 6.25; 6.25; 6.25 MG/1; MG/1; MG/1; MG/1
25 CAPSULE, EXTENDED RELEASE ORAL EVERY MORNING
Qty: 30 CAPSULE | Refills: 0 | Status: SHIPPED | OUTPATIENT
Start: 2022-07-11 | End: 2022-09-07 | Stop reason: SDUPTHER

## 2022-09-30 DIAGNOSIS — F90.9 ATTENTION DEFICIT HYPERACTIVITY DISORDER (ADHD), UNSPECIFIED ADHD TYPE: ICD-10-CM

## 2022-09-30 RX ORDER — DEXTROAMPHETAMINE SACCHARATE, AMPHETAMINE ASPARTATE MONOHYDRATE, DEXTROAMPHETAMINE SULFATE AND AMPHETAMINE SULFATE 6.25; 6.25; 6.25; 6.25 MG/1; MG/1; MG/1; MG/1
25 CAPSULE, EXTENDED RELEASE ORAL EVERY MORNING
Qty: 30 CAPSULE | Refills: 0 | Status: SHIPPED | OUTPATIENT
Start: 2022-09-30 | End: 2022-11-18 | Stop reason: SDUPTHER

## 2022-11-18 DIAGNOSIS — F90.9 ATTENTION DEFICIT HYPERACTIVITY DISORDER (ADHD), UNSPECIFIED ADHD TYPE: ICD-10-CM

## 2022-11-18 RX ORDER — DEXTROAMPHETAMINE SACCHARATE, AMPHETAMINE ASPARTATE MONOHYDRATE, DEXTROAMPHETAMINE SULFATE AND AMPHETAMINE SULFATE 6.25; 6.25; 6.25; 6.25 MG/1; MG/1; MG/1; MG/1
25 CAPSULE, EXTENDED RELEASE ORAL EVERY MORNING
Qty: 30 CAPSULE | Refills: 0 | Status: SHIPPED | OUTPATIENT
Start: 2022-11-18 | End: 2023-01-09 | Stop reason: SDUPTHER

## 2022-11-18 RX ORDER — DEXTROAMPHETAMINE SACCHARATE, AMPHETAMINE ASPARTATE MONOHYDRATE, DEXTROAMPHETAMINE SULFATE AND AMPHETAMINE SULFATE 6.25; 6.25; 6.25; 6.25 MG/1; MG/1; MG/1; MG/1
25 CAPSULE, EXTENDED RELEASE ORAL EVERY MORNING
Qty: 30 CAPSULE | Refills: 0 | Status: CANCELLED | OUTPATIENT
Start: 2022-11-18 | End: 2022-12-18

## 2022-12-12 ENCOUNTER — HOSPITAL ENCOUNTER (EMERGENCY)
Facility: HOSPITAL | Age: 37
Discharge: HOME | End: 2022-12-12
Attending: EMERGENCY MEDICINE
Payer: COMMERCIAL

## 2022-12-12 VITALS
TEMPERATURE: 99 F | DIASTOLIC BLOOD PRESSURE: 94 MMHG | HEIGHT: 69 IN | BODY MASS INDEX: 32.58 KG/M2 | SYSTOLIC BLOOD PRESSURE: 144 MMHG | HEART RATE: 83 BPM | RESPIRATION RATE: 18 BRPM | OXYGEN SATURATION: 96 % | WEIGHT: 220 LBS

## 2022-12-12 DIAGNOSIS — M25.512 ACUTE PAIN OF LEFT SHOULDER: Primary | ICD-10-CM

## 2022-12-12 LAB
ALBUMIN SERPL BCP-MCNC: 4.7 G/DL (ref 3.5–5.2)
ALP SERPL-CCNC: 65 U/L (ref 55–135)
ALT SERPL W/O P-5'-P-CCNC: 31 U/L (ref 10–44)
ANION GAP SERPL CALC-SCNC: 7 MMOL/L (ref 8–16)
AST SERPL-CCNC: 32 U/L (ref 10–40)
BASOPHILS # BLD AUTO: 0.06 K/UL (ref 0–0.2)
BASOPHILS NFR BLD: 0.6 % (ref 0–1.9)
BILIRUB SERPL-MCNC: 1.2 MG/DL (ref 0.1–1)
BNP SERPL-MCNC: 6 PG/ML (ref 0–99)
BUN SERPL-MCNC: 9 MG/DL (ref 6–20)
CALCIUM SERPL-MCNC: 9.5 MG/DL (ref 8.7–10.5)
CHLORIDE SERPL-SCNC: 99 MMOL/L (ref 95–110)
CO2 SERPL-SCNC: 31 MMOL/L (ref 23–29)
CREAT SERPL-MCNC: 1 MG/DL (ref 0.5–1.4)
D DIMER PPP IA.FEU-MCNC: 0.36 UG/ML FEU
DIFFERENTIAL METHOD: ABNORMAL
EOSINOPHIL # BLD AUTO: 0.1 K/UL (ref 0–0.5)
EOSINOPHIL NFR BLD: 1.2 % (ref 0–8)
ERYTHROCYTE [DISTWIDTH] IN BLOOD BY AUTOMATED COUNT: 11.9 % (ref 11.5–14.5)
EST. GFR  (NO RACE VARIABLE): >60 ML/MIN/1.73 M^2
GLUCOSE SERPL-MCNC: 92 MG/DL (ref 70–110)
HCT VFR BLD AUTO: 48.2 % (ref 40–54)
HGB BLD-MCNC: 16.3 G/DL (ref 14–18)
IMM GRANULOCYTES # BLD AUTO: 0.04 K/UL (ref 0–0.04)
IMM GRANULOCYTES NFR BLD AUTO: 0.4 % (ref 0–0.5)
LYMPHOCYTES # BLD AUTO: 2.2 K/UL (ref 1–4.8)
LYMPHOCYTES NFR BLD: 23.1 % (ref 18–48)
MCH RBC QN AUTO: 31.8 PG (ref 27–31)
MCHC RBC AUTO-ENTMCNC: 33.8 G/DL (ref 32–36)
MCV RBC AUTO: 94 FL (ref 82–98)
MONOCYTES # BLD AUTO: 0.5 K/UL (ref 0.3–1)
MONOCYTES NFR BLD: 5.1 % (ref 4–15)
NEUTROPHILS # BLD AUTO: 6.5 K/UL (ref 1.8–7.7)
NEUTROPHILS NFR BLD: 69.6 % (ref 38–73)
NRBC BLD-RTO: 0 /100 WBC
PLATELET # BLD AUTO: 243 K/UL (ref 150–450)
PMV BLD AUTO: 10.3 FL (ref 9.2–12.9)
POTASSIUM SERPL-SCNC: 3.7 MMOL/L (ref 3.5–5.1)
PROT SERPL-MCNC: 8.5 G/DL (ref 6–8.4)
RBC # BLD AUTO: 5.12 M/UL (ref 4.6–6.2)
SODIUM SERPL-SCNC: 137 MMOL/L (ref 136–145)
TROPONIN I SERPL HS-MCNC: 3.2 PG/ML (ref 0–14.9)
WBC # BLD AUTO: 9.37 K/UL (ref 3.9–12.7)

## 2022-12-12 PROCEDURE — 93010 EKG 12-LEAD: ICD-10-PCS | Mod: ,,, | Performed by: INTERNAL MEDICINE

## 2022-12-12 PROCEDURE — 93010 ELECTROCARDIOGRAM REPORT: CPT | Mod: ,,, | Performed by: INTERNAL MEDICINE

## 2022-12-12 PROCEDURE — 63600175 PHARM REV CODE 636 W HCPCS: Performed by: STUDENT IN AN ORGANIZED HEALTH CARE EDUCATION/TRAINING PROGRAM

## 2022-12-12 PROCEDURE — 99284 EMERGENCY DEPT VISIT MOD MDM: CPT | Mod: 25

## 2022-12-12 PROCEDURE — 83880 ASSAY OF NATRIURETIC PEPTIDE: CPT | Performed by: EMERGENCY MEDICINE

## 2022-12-12 PROCEDURE — 84484 ASSAY OF TROPONIN QUANT: CPT | Performed by: EMERGENCY MEDICINE

## 2022-12-12 PROCEDURE — 93005 ELECTROCARDIOGRAM TRACING: CPT | Performed by: INTERNAL MEDICINE

## 2022-12-12 PROCEDURE — 25000003 PHARM REV CODE 250: Performed by: STUDENT IN AN ORGANIZED HEALTH CARE EDUCATION/TRAINING PROGRAM

## 2022-12-12 PROCEDURE — 85025 COMPLETE CBC W/AUTO DIFF WBC: CPT | Performed by: EMERGENCY MEDICINE

## 2022-12-12 PROCEDURE — 80053 COMPREHEN METABOLIC PANEL: CPT | Performed by: EMERGENCY MEDICINE

## 2022-12-12 PROCEDURE — 85379 FIBRIN DEGRADATION QUANT: CPT | Performed by: EMERGENCY MEDICINE

## 2022-12-12 PROCEDURE — 96374 THER/PROPH/DIAG INJ IV PUSH: CPT

## 2022-12-12 RX ORDER — METHOCARBAMOL 500 MG/1
1000 TABLET, FILM COATED ORAL 3 TIMES DAILY
Qty: 30 TABLET | Refills: 0 | Status: SHIPPED | OUTPATIENT
Start: 2022-12-12 | End: 2022-12-17

## 2022-12-12 RX ORDER — METHOCARBAMOL 500 MG/1
1000 TABLET, FILM COATED ORAL
Status: COMPLETED | OUTPATIENT
Start: 2022-12-12 | End: 2022-12-12

## 2022-12-12 RX ORDER — KETOROLAC TROMETHAMINE 30 MG/ML
15 INJECTION, SOLUTION INTRAMUSCULAR; INTRAVENOUS
Status: COMPLETED | OUTPATIENT
Start: 2022-12-12 | End: 2022-12-12

## 2022-12-12 RX ADMIN — KETOROLAC TROMETHAMINE 15 MG: 30 INJECTION, SOLUTION INTRAMUSCULAR; INTRAVENOUS at 05:12

## 2022-12-12 RX ADMIN — METHOCARBAMOL 1000 MG: 500 TABLET ORAL at 05:12

## 2022-12-12 NOTE — FIRST PROVIDER EVALUATION
"Medical screening examination initiated.  I have conducted a focused provider triage encounter, findings are as follows:    Brief history of present illness: chest pain     Vitals:    12/12/22 1619   BP: (!) 165/106   Pulse: 92   Resp: 17   Temp: 98.3 °F (36.8 °C)   TempSrc: Oral   SpO2: 97%   Weight: 99.8 kg (220 lb)   Height: 5' 9" (1.753 m)       Pertinent physical exam: c/o chest pain off and on for last few day c/o pain with breathing  to left anterior chest . Also however reports pain increased with movement as well     Brief workup plan: labs ekg     Preliminary workup initiated; this workup will be continued and followed by the physician or advanced practice provider that is assigned to the patient when roomed.  "

## 2022-12-12 NOTE — ED PROVIDER NOTES
Encounter Date: 12/12/2022       History     Chief Complaint   Patient presents with    Shoulder Pain     L sine yesterday with radiation into back and chest      37-year-old male presents emergency room today for evaluation of 2 days of left shoulder pain.  Pain is now radiating into the anterior left chest wall.  Patient describes pain as dull, intermittent.  It is worse with abduction of the left upper extremity.  Can not recall any injury but does report that he feels like he has been lifting more work recently.  No cardiac history.    Review of patient's allergies indicates:  No Known Allergies  Past Medical History:   Diagnosis Date    ADHD      Past Surgical History:   Procedure Laterality Date    ANKLE FRACTURE SURGERY Left 2008    left ankle surgery 2008      OPEN REDUCTION AND INTERNAL FIXATION (ORIF) OF FRACTURE OF CLAVICLE Right 8/18/2020    Procedure: ORIF, FRACTURE, CLAVICLE;  Surgeon: Murtaza Adkins Jr., MD;  Location: [facility];  Service: Orthopedics;  Laterality: Right;  Accumed     Family History   Problem Relation Age of Onset    Diabetes Maternal Grandfather      Social History     Tobacco Use    Smoking status: Former    Smokeless tobacco: Former     Quit date: 10/1/2015   Substance Use Topics    Alcohol use: Yes     Comment: socially    Drug use: No     Review of Systems   Constitutional:  Negative for chills, fatigue and fever.   HENT:  Negative for congestion, hearing loss, sore throat and trouble swallowing.    Eyes:  Negative for visual disturbance.   Respiratory:  Negative for cough, chest tightness and shortness of breath.    Cardiovascular:  Negative for palpitations.   Gastrointestinal:  Negative for abdominal pain and nausea.   Endocrine: Negative for polyuria.   Genitourinary:  Negative for difficulty urinating.   Musculoskeletal:  Negative for arthralgias and myalgias.   Skin:  Negative for rash.   Neurological:  Negative for dizziness and headaches.   Psychiatric/Behavioral:  The  patient is not nervous/anxious.      Physical Exam     Initial Vitals [12/12/22 1619]   BP Pulse Resp Temp SpO2   (!) 165/106 92 17 98.3 °F (36.8 °C) 97 %      MAP       --         Physical Exam    Nursing note and vitals reviewed.  Constitutional: He appears well-developed and well-nourished.   HENT:   Head: Normocephalic and atraumatic.   Eyes: Conjunctivae and EOM are normal.   Neck: Neck supple.   Cardiovascular:            Otherwise chest wall is stable, nontender. No overlying skin changes. No masses or crepitus.   Radial and Pedal pulses 2+ b/l. Capillary refill <2sec. Skin is pink/warm/dry without peripheral cyanosis. There is no dependent edema. No carotid bruits.  Heart has regular rate and rhythm without murmurs/gallops/rubs. No increased work of breathing. Breath sounds full and equal in all four quadrants. Chest rise and fall equal. No adventitious sounds.      Pulmonary/Chest: No respiratory distress.   Musculoskeletal:         General: Normal range of motion.      Cervical back: Neck supple.     Neurological: He is alert and oriented to person, place, and time. GCS score is 15. GCS eye subscore is 4. GCS verbal subscore is 5. GCS motor subscore is 6.   Skin: Skin is warm and dry. Capillary refill takes less than 2 seconds.   Psychiatric: He has a normal mood and affect. His behavior is normal. Judgment and thought content normal.       ED Course   Procedures  Labs Reviewed   CBC W/ AUTO DIFFERENTIAL - Abnormal; Notable for the following components:       Result Value    MCH 31.8 (*)     All other components within normal limits   COMPREHENSIVE METABOLIC PANEL - Abnormal; Notable for the following components:    CO2 31 (*)     Total Protein 8.5 (*)     Total Bilirubin 1.2 (*)     Anion Gap 7 (*)     All other components within normal limits   TROPONIN I HIGH SENSITIVITY   B-TYPE NATRIURETIC PEPTIDE   D DIMER, QUANTITATIVE   TROPONIN I HIGH SENSITIVITY   DRUG SCREEN PANEL, URINE EMERGENCY        ECG  Results              EKG 12-lead (In process)  Result time 12/12/22 16:33:43      In process by Interface, Lab In [facility] (12/12/22 16:33:43)                   Narrative:    Test Reason : R07.9,    Vent. Rate : 081 BPM     Atrial Rate : 081 BPM     P-R Int : 126 ms          QRS Dur : 094 ms      QT Int : 364 ms       P-R-T Axes : 030 001 -06 degrees     QTc Int : 422 ms    Normal sinus rhythm with sinus arrhythmia  Voltage criteria for left ventricular hypertrophy  Abnormal ECG  No previous ECGs available    Referred By: [email or web address]   SELF           Confirmed By:                                   Imaging Results              X-Ray Chest PA And Lateral (Final result)  Result time 12/12/22 17:34:54      Final result by Christina Palacios MD (12/12/22 17:34:54)                   Narrative:    Reason: Chest Pain Chest Pain and shoulder pain, started lastnight    FINDINGS:  PA and lateral chest is compared to 5/23/2020 show normal cardiomediastinal silhouette. There has been prior internal fixation of a right clavicular fracture with cortical plate and fixating screws.    Lungs are clear. Pulmonary vasculature is normal. Bones are normal.    IMPRESSION:  Normal chest.    Electronically signed by:  Christina Palacios MD  12/12/2022 5:34 PM CST Workstation: [id number]                                     Medications   ketorolac injection 15 mg (15 mg Intravenous Given 12/12/22 1753)   methocarbamoL tablet 1,000 mg (1,000 mg Oral Given 12/12/22 1752)     Medical Decision Making:   Initial Assessment:   Nontoxic, well-appearing and in no acute distress.  ED Management:  37-year-old male without significant past medical history presents emergency room today for evaluation of 2 days of left shoulder pain.  History and physical consistent with musculoskeletal injury.  EKG is nonischemic, chest x-ray without acute cardiopulmonary abnormality.  Labs are largely unremarkable.  Unconcerning troponin, negative D-dimer.  No evidence of  anemia, infection, significant metabolic abnormality.  Patient had significant improvement with Toradol and Robaxin.  I will discharge him home with a short course of Robaxin.  Patient also noted to have asymptomatic hypertension in the emergency room.  Given this is 1st occurrence, will not initiate antihypertensives at this time.  Recommend he continue to monitor and follow-up with his primary care provider.    Discharged home in stable condition.      Disposition:  Improved, discharged  Plan to discharge home with appropriate follow-up, including primary care manager.    I discussed the findings and plan of care with this patient.  All questions were answered to the patient's satisfaction.  Disposition plan as above.  Verbal and written discharge instructions provided to the patient on discharge.  Return precautions discussed prior to discharge.     I discuss this patient case with the cosigning physician, who agrees with diagnosis and plan of care. This note was written using the assistance of a dictation program and may contain grammatical errors.   Additional MDM:   PERC Rule:   Age is greater than or equal to 50 = 0.0  Heart Rate is greater than or equal to 100 = 0.0  SaO2 on room air < 95% = 0.0  Unilateral leg swelling = 0.0  Hemoptysis = 0.0  Recent surgery or trauma = 0.0  Prior PE or DVT =  0.0  Hormone use = 0.00  PERC Score = 0                     Clinical Impression:   Final diagnoses:  [M25.512] Acute pain of left shoulder (Primary)        ED Disposition Condition    Discharge Stable          ED Prescriptions       Medication Sig Dispense Start Date End Date Auth. Provider    methocarbamoL (ROBAXIN) 500 MG Tab Take 2 tablets (1,000 mg total) by mouth 3 (three) times daily. for 5 days 30 tablet 12/12/2022 12/17/2022 Mulugeta Paulino PA-C          Follow-up Information       Follow up With Specialties Details Why Contact Info Additional Information    Johnathan Gill PA-C Family Medicine Schedule  an appointment as soon as possible for a visit in 1 week  [address]  [address]  [place] [facility] [address]  [phone]       [facility] - Emergency Dept Emergency Medicine Go to  As needed, If symptoms worsen [address] [address]  [phone] 1st floor             Mulugeta Paulino PA-C  12/12/22 8723

## 2023-01-09 ENCOUNTER — OFFICE VISIT (OUTPATIENT)
Dept: FAMILY MEDICINE | Facility: CLINIC | Age: 38
End: 2023-01-09
Payer: COMMERCIAL

## 2023-01-09 ENCOUNTER — PATIENT MESSAGE (OUTPATIENT)
Dept: FAMILY MEDICINE | Facility: CLINIC | Age: 38
End: 2023-01-09

## 2023-01-09 VITALS
HEIGHT: 69 IN | SYSTOLIC BLOOD PRESSURE: 138 MMHG | DIASTOLIC BLOOD PRESSURE: 86 MMHG | HEART RATE: 84 BPM | OXYGEN SATURATION: 99 % | WEIGHT: 222.81 LBS | BODY MASS INDEX: 33 KG/M2

## 2023-01-09 DIAGNOSIS — K21.9 GASTROESOPHAGEAL REFLUX DISEASE, UNSPECIFIED WHETHER ESOPHAGITIS PRESENT: ICD-10-CM

## 2023-01-09 DIAGNOSIS — Z23 NEED FOR INFLUENZA VACCINATION: ICD-10-CM

## 2023-01-09 DIAGNOSIS — F90.9 ATTENTION DEFICIT HYPERACTIVITY DISORDER (ADHD), UNSPECIFIED ADHD TYPE: Primary | ICD-10-CM

## 2023-01-09 PROCEDURE — 99214 PR OFFICE/OUTPT VISIT, EST, LEVL IV, 30-39 MIN: ICD-10-PCS | Mod: 25,S$GLB,, | Performed by: PHYSICIAN ASSISTANT

## 2023-01-09 PROCEDURE — 90471 IMMUNIZATION ADMIN: CPT | Mod: S$GLB,,, | Performed by: PHYSICIAN ASSISTANT

## 2023-01-09 PROCEDURE — 90682 FLU VACCINE - QUADRIVALENT (RECOMBINANT) PRESERVATIVE FREE: ICD-10-PCS | Mod: S$GLB,,, | Performed by: PHYSICIAN ASSISTANT

## 2023-01-09 PROCEDURE — 99214 OFFICE O/P EST MOD 30 MIN: CPT | Mod: 25,S$GLB,, | Performed by: PHYSICIAN ASSISTANT

## 2023-01-09 PROCEDURE — 3008F BODY MASS INDEX DOCD: CPT | Mod: CPTII,S$GLB,, | Performed by: PHYSICIAN ASSISTANT

## 2023-01-09 PROCEDURE — 90471 FLU VACCINE - QUADRIVALENT (RECOMBINANT) PRESERVATIVE FREE: ICD-10-PCS | Mod: S$GLB,,, | Performed by: PHYSICIAN ASSISTANT

## 2023-01-09 PROCEDURE — 1159F MED LIST DOCD IN RCRD: CPT | Mod: CPTII,S$GLB,, | Performed by: PHYSICIAN ASSISTANT

## 2023-01-09 PROCEDURE — 3008F PR BODY MASS INDEX (BMI) DOCUMENTED: ICD-10-PCS | Mod: CPTII,S$GLB,, | Performed by: PHYSICIAN ASSISTANT

## 2023-01-09 PROCEDURE — 1159F PR MEDICATION LIST DOCUMENTED IN MEDICAL RECORD: ICD-10-PCS | Mod: CPTII,S$GLB,, | Performed by: PHYSICIAN ASSISTANT

## 2023-01-09 PROCEDURE — 90682 RIV4 VACC RECOMBINANT DNA IM: CPT | Mod: S$GLB,,, | Performed by: PHYSICIAN ASSISTANT

## 2023-01-09 RX ORDER — DEXTROAMPHETAMINE SACCHARATE, AMPHETAMINE ASPARTATE, DEXTROAMPHETAMINE SULFATE AND AMPHETAMINE SULFATE 2.5; 2.5; 2.5; 2.5 MG/1; MG/1; MG/1; MG/1
1 TABLET ORAL DAILY
COMMUNITY
Start: 2022-07-18 | End: 2023-01-09 | Stop reason: SDUPTHER

## 2023-01-09 RX ORDER — DEXTROAMPHETAMINE SACCHARATE, AMPHETAMINE ASPARTATE MONOHYDRATE, DEXTROAMPHETAMINE SULFATE AND AMPHETAMINE SULFATE 2.5; 2.5; 2.5; 2.5 MG/1; MG/1; MG/1; MG/1
10 CAPSULE, EXTENDED RELEASE ORAL EVERY MORNING
Qty: 30 CAPSULE | Refills: 0 | Status: CANCELLED | OUTPATIENT
Start: 2023-01-09

## 2023-01-09 RX ORDER — DEXTROAMPHETAMINE SACCHARATE, AMPHETAMINE ASPARTATE, DEXTROAMPHETAMINE SULFATE AND AMPHETAMINE SULFATE 2.5; 2.5; 2.5; 2.5 MG/1; MG/1; MG/1; MG/1
1 TABLET ORAL DAILY
Qty: 30 TABLET | Refills: 0 | Status: SHIPPED | OUTPATIENT
Start: 2023-03-08 | End: 2023-04-21 | Stop reason: SDUPTHER

## 2023-01-09 RX ORDER — DEXTROAMPHETAMINE SACCHARATE, AMPHETAMINE ASPARTATE MONOHYDRATE, DEXTROAMPHETAMINE SULFATE AND AMPHETAMINE SULFATE 6.25; 6.25; 6.25; 6.25 MG/1; MG/1; MG/1; MG/1
25 CAPSULE, EXTENDED RELEASE ORAL EVERY MORNING
Qty: 30 CAPSULE | Refills: 0 | Status: SHIPPED | OUTPATIENT
Start: 2023-01-09 | End: 2023-02-08

## 2023-01-09 RX ORDER — DEXTROAMPHETAMINE SACCHARATE, AMPHETAMINE ASPARTATE, DEXTROAMPHETAMINE SULFATE AND AMPHETAMINE SULFATE 2.5; 2.5; 2.5; 2.5 MG/1; MG/1; MG/1; MG/1
1 TABLET ORAL DAILY
Qty: 30 TABLET | Refills: 0 | Status: SHIPPED | OUTPATIENT
Start: 2023-01-09 | End: 2023-04-21 | Stop reason: SDUPTHER

## 2023-01-09 RX ORDER — DEXTROAMPHETAMINE SACCHARATE, AMPHETAMINE ASPARTATE MONOHYDRATE, DEXTROAMPHETAMINE SULFATE AND AMPHETAMINE SULFATE 2.5; 2.5; 2.5; 2.5 MG/1; MG/1; MG/1; MG/1
10 CAPSULE, EXTENDED RELEASE ORAL EVERY MORNING
Qty: 30 CAPSULE | Refills: 0 | Status: CANCELLED | OUTPATIENT
Start: 2023-03-10

## 2023-01-09 RX ORDER — DEXTROAMPHETAMINE SACCHARATE, AMPHETAMINE ASPARTATE MONOHYDRATE, DEXTROAMPHETAMINE SULFATE AND AMPHETAMINE SULFATE 2.5; 2.5; 2.5; 2.5 MG/1; MG/1; MG/1; MG/1
10 CAPSULE, EXTENDED RELEASE ORAL EVERY MORNING
Qty: 30 CAPSULE | Refills: 0 | Status: CANCELLED | OUTPATIENT
Start: 2023-02-08

## 2023-01-09 RX ORDER — PANTOPRAZOLE SODIUM 40 MG/1
40 TABLET, DELAYED RELEASE ORAL DAILY
Qty: 30 TABLET | Refills: 5 | Status: SHIPPED | OUTPATIENT
Start: 2023-01-09 | End: 2023-04-21 | Stop reason: SDUPTHER

## 2023-01-09 RX ORDER — DEXTROAMPHETAMINE SACCHARATE, AMPHETAMINE ASPARTATE MONOHYDRATE, DEXTROAMPHETAMINE SULFATE AND AMPHETAMINE SULFATE 6.25; 6.25; 6.25; 6.25 MG/1; MG/1; MG/1; MG/1
25 CAPSULE, EXTENDED RELEASE ORAL EVERY MORNING
Qty: 30 CAPSULE | Refills: 0 | Status: SHIPPED | OUTPATIENT
Start: 2023-02-08 | End: 2023-03-17 | Stop reason: SDUPTHER

## 2023-01-09 RX ORDER — DEXTROAMPHETAMINE SACCHARATE, AMPHETAMINE ASPARTATE MONOHYDRATE, DEXTROAMPHETAMINE SULFATE AND AMPHETAMINE SULFATE 6.25; 6.25; 6.25; 6.25 MG/1; MG/1; MG/1; MG/1
25 CAPSULE, EXTENDED RELEASE ORAL EVERY MORNING
Qty: 30 CAPSULE | Refills: 0 | Status: SHIPPED | OUTPATIENT
Start: 2023-03-10 | End: 2023-04-21 | Stop reason: SDUPTHER

## 2023-01-09 RX ORDER — DEXTROAMPHETAMINE SACCHARATE, AMPHETAMINE ASPARTATE, DEXTROAMPHETAMINE SULFATE AND AMPHETAMINE SULFATE 2.5; 2.5; 2.5; 2.5 MG/1; MG/1; MG/1; MG/1
1 TABLET ORAL DAILY
Qty: 30 TABLET | Refills: 0 | Status: SHIPPED | OUTPATIENT
Start: 2023-02-08 | End: 2023-04-21 | Stop reason: SDUPTHER

## 2023-01-09 NOTE — PROGRESS NOTES
SUBJECTIVE:    Patient ID: Owen Gonzalez is a 37 y.o. male.    Chief Complaint: Annual Exam (No bottles/Flu Wanted/BG)    This is a 37-year-old male who presents today for annual exam.  ER visit noted in the chart from December.  Felt to be musculoskeletal left shoulder and chest pain.  Patient's pressure was noted to be elevated in the ER and again today in clinic. He does report that he has had some worsening reflux of late. So bad that it does awaken him from sleeping at night with bad reflux/acid. Mindful of triggers and still really bad for him. TUMS is the only thing he has tried. Pressure does look better today in clinic.      Admission on 12/12/2022, Discharged on 12/12/2022   Component Date Value Ref Range Status    WBC 12/12/2022 9.37  3.90 - 12.70 K/uL Final    RBC 12/12/2022 5.12  4.60 - 6.20 M/uL Final    Hemoglobin 12/12/2022 16.3  14.0 - 18.0 g/dL Final    Hematocrit 12/12/2022 48.2  40.0 - 54.0 % Final    MCV 12/12/2022 94  82 - 98 fL Final    MCH 12/12/2022 31.8 (H)  27.0 - 31.0 pg Final    MCHC 12/12/2022 33.8  32.0 - 36.0 g/dL Final    RDW 12/12/2022 11.9  11.5 - 14.5 % Final    Platelets 12/12/2022 243  150 - 450 K/uL Final    MPV 12/12/2022 10.3  9.2 - 12.9 fL Final    Immature Granulocytes 12/12/2022 0.4  0.0 - 0.5 % Final    Gran # (ANC) 12/12/2022 6.5  1.8 - 7.7 K/uL Final    Immature Grans (Abs) 12/12/2022 0.04  0.00 - 0.04 K/uL Final    Lymph # 12/12/2022 2.2  1.0 - 4.8 K/uL Final    Mono # 12/12/2022 0.5  0.3 - 1.0 K/uL Final    Eos # 12/12/2022 0.1  0.0 - 0.5 K/uL Final    Baso # 12/12/2022 0.06  0.00 - 0.20 K/uL Final    nRBC 12/12/2022 0  0 /100 WBC Final    Gran % 12/12/2022 69.6  38.0 - 73.0 % Final    Lymph % 12/12/2022 23.1  18.0 - 48.0 % Final    Mono % 12/12/2022 5.1  4.0 - 15.0 % Final    Eosinophil % 12/12/2022 1.2  0.0 - 8.0 % Final    Basophil % 12/12/2022 0.6  0.0 - 1.9 % Final    Differential Method 12/12/2022 Automated   Final    Sodium 12/12/2022 137  136 - 145  mmol/L Final    Potassium 12/12/2022 3.7  3.5 - 5.1 mmol/L Final    Chloride 12/12/2022 99  95 - 110 mmol/L Final    CO2 12/12/2022 31 (H)  23 - 29 mmol/L Final    Glucose 12/12/2022 92  70 - 110 mg/dL Final    BUN 12/12/2022 9  6 - 20 mg/dL Final    Creatinine 12/12/2022 1.0  0.5 - 1.4 mg/dL Final    Calcium 12/12/2022 9.5  8.7 - 10.5 mg/dL Final    Total Protein 12/12/2022 8.5 (H)  6.0 - 8.4 g/dL Final    Albumin 12/12/2022 4.7  3.5 - 5.2 g/dL Final    Total Bilirubin 12/12/2022 1.2 (H)  0.1 - 1.0 mg/dL Final    Alkaline Phosphatase 12/12/2022 65  55 - 135 U/L Final    AST 12/12/2022 32  10 - 40 U/L Final    ALT 12/12/2022 31  10 - 44 U/L Final    Anion Gap 12/12/2022 7 (L)  8 - 16 mmol/L Final    eGFR 12/12/2022 >60.0  >60 mL/min/1.73 m^2 Final    Troponin I High Sensitivity 12/12/2022 3.2  0.0 - 14.9 pg/mL Final    BNP 12/12/2022 6  0 - 99 pg/mL Final    D-Dimer 12/12/2022 0.36  <0.50 ug/mL FEU Final       Past Medical History:   Diagnosis Date    ADHD      Past Surgical History:   Procedure Laterality Date    ANKLE FRACTURE SURGERY Left 2008    left ankle surgery 2008      OPEN REDUCTION AND INTERNAL FIXATION (ORIF) OF FRACTURE OF CLAVICLE Right 8/18/2020    Procedure: ORIF, FRACTURE, CLAVICLE;  Surgeon: Murtaza Adkins Jr., MD;  Location: [facility];  Service: Orthopedics;  Laterality: Right;  Accumed     Family History   Problem Relation Age of Onset    Diabetes Maternal Grandfather        Marital Status: Single  Alcohol History:  reports current alcohol use.  Tobacco History:  reports that he has quit smoking. He quit smokeless tobacco use about 7 years ago.  Drug History:  reports no history of drug use.    Review of patient's allergies indicates:  No Known Allergies    Current Outpatient Medications:     dextroamphetamine-amphetamine (ADDERALL XR) 25 MG 24 hr capsule, Take 1 capsule (25 mg total) by mouth every morning., Disp: 30 capsule, Rfl: 0    [START ON 2/8/2023] dextroamphetamine-amphetamine (ADDERALL  "XR) 25 MG 24 hr capsule, Take 1 capsule (25 mg total) by mouth every morning., Disp: 30 capsule, Rfl: 0    [START ON 3/10/2023] dextroamphetamine-amphetamine (ADDERALL XR) 25 MG 24 hr capsule, Take 1 capsule (25 mg total) by mouth every morning., Disp: 30 capsule, Rfl: 0    dextroamphetamine-amphetamine 10 mg Tab, Take 1 tablet (10 mg total) by mouth once daily., Disp: 30 tablet, Rfl: 0    [START ON 2/8/2023] dextroamphetamine-amphetamine 10 mg Tab, Take 1 tablet (10 mg total) by mouth once daily., Disp: 30 tablet, Rfl: 0    [START ON 3/8/2023] dextroamphetamine-amphetamine 10 mg Tab, Take 1 tablet (10 mg total) by mouth once daily., Disp: 30 tablet, Rfl: 0    pantoprazole (PROTONIX) 40 MG tablet, Take 1 tablet (40 mg total) by mouth once daily., Disp: 30 tablet, Rfl: 5    Review of Systems   Constitutional:  Negative for activity change, fatigue, fever and unexpected weight change.   HENT:  Negative for congestion.    Respiratory:  Negative for apnea, cough, chest tightness and shortness of breath.    Cardiovascular:  Negative for chest pain and palpitations.   Gastrointestinal:  Negative for abdominal distention and abdominal pain.        GERD   Genitourinary:  Negative for difficulty urinating and dysuria.   Musculoskeletal:  Negative for arthralgias and back pain.   Neurological:  Negative for dizziness and weakness.        Objective:      Vitals:    01/09/23 0849   BP: 138/86   Pulse: 84   SpO2: 99%   Weight: 101.1 kg (222 lb 12.8 oz)   Height: 5' 9" (1.753 m)     Physical Exam  Constitutional:       General: He is not in acute distress.     Appearance: He is well-developed. He is obese.   HENT:      Head: Normocephalic and atraumatic.   Eyes:      Pupils: Pupils are equal, round, and reactive to light.   Neck:      Thyroid: No thyromegaly.   Cardiovascular:      Rate and Rhythm: Normal rate and regular rhythm.      Heart sounds: Normal heart sounds.   Pulmonary:      Effort: Pulmonary effort is normal.      " Breath sounds: Normal breath sounds.   Abdominal:      General: Bowel sounds are normal. There is no distension.      Palpations: Abdomen is soft.      Tenderness: There is no abdominal tenderness.   Musculoskeletal:         General: Normal range of motion.      Cervical back: Normal range of motion and neck supple.   Skin:     General: Skin is warm and dry.      Findings: No erythema or rash.   Neurological:      Mental Status: He is alert and oriented to person, place, and time.      Cranial Nerves: No cranial nerve deficit.         Assessment:       1. Attention deficit hyperactivity disorder (ADHD), unspecified ADHD type    2. Need for influenza vaccination    3. Gastroesophageal reflux disease, unspecified whether esophagitis present           Plan:       Attention deficit hyperactivity disorder (ADHD), unspecified ADHD type  Comments:  Changing dosage form to 25mg XR and 10mg afternoon if needed. Labs today. will let me know about efficacy and can refill if appropriate  Orders:  -     dextroamphetamine-amphetamine (ADDERALL XR) 25 MG 24 hr capsule; Take 1 capsule (25 mg total) by mouth every morning.  Dispense: 30 capsule; Refill: 0  -     dextroamphetamine-amphetamine (ADDERALL XR) 25 MG 24 hr capsule; Take 1 capsule (25 mg total) by mouth every morning.  Dispense: 30 capsule; Refill: 0  -     dextroamphetamine-amphetamine (ADDERALL XR) 25 MG 24 hr capsule; Take 1 capsule (25 mg total) by mouth every morning.  Dispense: 30 capsule; Refill: 0  -     dextroamphetamine-amphetamine 10 mg Tab; Take 1 tablet (10 mg total) by mouth once daily.  Dispense: 30 tablet; Refill: 0  -     dextroamphetamine-amphetamine 10 mg Tab; Take 1 tablet (10 mg total) by mouth once daily.  Dispense: 30 tablet; Refill: 0  -     dextroamphetamine-amphetamine 10 mg Tab; Take 1 tablet (10 mg total) by mouth once daily.  Dispense: 30 tablet; Refill: 0    Need for influenza vaccination  -     Influenza - Quadrivalent (Recombinant)  (PF)    Gastroesophageal reflux disease, unspecified whether esophagitis present  Comments:  add PPI today. discussed with pt proper dosing. f/u in 2-3 mos to discuss efficacy.  Orders:  -     pantoprazole (PROTONIX) 40 MG tablet; Take 1 tablet (40 mg total) by mouth once daily.  Dispense: 30 tablet; Refill: 5      Follow up in about 3 months (around 4/9/2023) for gerd followup.        1/9/2023 Johnathan Gill PA-C

## 2023-04-21 ENCOUNTER — OFFICE VISIT (OUTPATIENT)
Dept: FAMILY MEDICINE | Facility: CLINIC | Age: 38
End: 2023-04-21
Payer: COMMERCIAL

## 2023-04-21 VITALS
HEART RATE: 72 BPM | SYSTOLIC BLOOD PRESSURE: 116 MMHG | BODY MASS INDEX: 32.14 KG/M2 | DIASTOLIC BLOOD PRESSURE: 84 MMHG | WEIGHT: 217 LBS | HEIGHT: 69 IN

## 2023-04-21 DIAGNOSIS — F90.9 ATTENTION DEFICIT HYPERACTIVITY DISORDER (ADHD), UNSPECIFIED ADHD TYPE: ICD-10-CM

## 2023-04-21 DIAGNOSIS — K21.9 GASTROESOPHAGEAL REFLUX DISEASE, UNSPECIFIED WHETHER ESOPHAGITIS PRESENT: ICD-10-CM

## 2023-04-21 PROCEDURE — 3008F PR BODY MASS INDEX (BMI) DOCUMENTED: ICD-10-PCS | Mod: CPTII,S$GLB,, | Performed by: PHYSICIAN ASSISTANT

## 2023-04-21 PROCEDURE — 1159F PR MEDICATION LIST DOCUMENTED IN MEDICAL RECORD: ICD-10-PCS | Mod: CPTII,S$GLB,, | Performed by: PHYSICIAN ASSISTANT

## 2023-04-21 PROCEDURE — 3079F DIAST BP 80-89 MM HG: CPT | Mod: CPTII,S$GLB,, | Performed by: PHYSICIAN ASSISTANT

## 2023-04-21 PROCEDURE — 99214 PR OFFICE/OUTPT VISIT, EST, LEVL IV, 30-39 MIN: ICD-10-PCS | Mod: S$GLB,,, | Performed by: PHYSICIAN ASSISTANT

## 2023-04-21 PROCEDURE — 1159F MED LIST DOCD IN RCRD: CPT | Mod: CPTII,S$GLB,, | Performed by: PHYSICIAN ASSISTANT

## 2023-04-21 PROCEDURE — 3008F BODY MASS INDEX DOCD: CPT | Mod: CPTII,S$GLB,, | Performed by: PHYSICIAN ASSISTANT

## 2023-04-21 PROCEDURE — 99214 OFFICE O/P EST MOD 30 MIN: CPT | Mod: S$GLB,,, | Performed by: PHYSICIAN ASSISTANT

## 2023-04-21 PROCEDURE — 3074F SYST BP LT 130 MM HG: CPT | Mod: CPTII,S$GLB,, | Performed by: PHYSICIAN ASSISTANT

## 2023-04-21 PROCEDURE — 3079F PR MOST RECENT DIASTOLIC BLOOD PRESSURE 80-89 MM HG: ICD-10-PCS | Mod: CPTII,S$GLB,, | Performed by: PHYSICIAN ASSISTANT

## 2023-04-21 PROCEDURE — 3074F PR MOST RECENT SYSTOLIC BLOOD PRESSURE < 130 MM HG: ICD-10-PCS | Mod: CPTII,S$GLB,, | Performed by: PHYSICIAN ASSISTANT

## 2023-04-21 RX ORDER — PANTOPRAZOLE SODIUM 40 MG/1
40 TABLET, DELAYED RELEASE ORAL DAILY
Qty: 30 TABLET | Refills: 5 | Status: SHIPPED | OUTPATIENT
Start: 2023-04-21 | End: 2023-07-27 | Stop reason: SDUPTHER

## 2023-04-21 RX ORDER — DEXTROAMPHETAMINE SACCHARATE, AMPHETAMINE ASPARTATE MONOHYDRATE, DEXTROAMPHETAMINE SULFATE AND AMPHETAMINE SULFATE 6.25; 6.25; 6.25; 6.25 MG/1; MG/1; MG/1; MG/1
CAPSULE, EXTENDED RELEASE ORAL EVERY MORNING
COMMUNITY
Start: 2023-03-17 | End: 2023-04-21 | Stop reason: SDUPTHER

## 2023-04-21 RX ORDER — DEXTROAMPHETAMINE SACCHARATE, AMPHETAMINE ASPARTATE, DEXTROAMPHETAMINE SULFATE AND AMPHETAMINE SULFATE 2.5; 2.5; 2.5; 2.5 MG/1; MG/1; MG/1; MG/1
1 TABLET ORAL DAILY
Qty: 30 TABLET | Refills: 0 | Status: SHIPPED | OUTPATIENT
Start: 2023-05-21 | End: 2023-07-27 | Stop reason: SDUPTHER

## 2023-04-21 RX ORDER — DEXTROAMPHETAMINE SACCHARATE, AMPHETAMINE ASPARTATE MONOHYDRATE, DEXTROAMPHETAMINE SULFATE AND AMPHETAMINE SULFATE 7.5; 7.5; 7.5; 7.5 MG/1; MG/1; MG/1; MG/1
30 CAPSULE, EXTENDED RELEASE ORAL EVERY MORNING
Qty: 30 CAPSULE | Refills: 0 | Status: SHIPPED | OUTPATIENT
Start: 2023-06-20 | End: 2023-07-27 | Stop reason: SDUPTHER

## 2023-04-21 RX ORDER — DEXTROAMPHETAMINE SACCHARATE, AMPHETAMINE ASPARTATE, DEXTROAMPHETAMINE SULFATE AND AMPHETAMINE SULFATE 2.5; 2.5; 2.5; 2.5 MG/1; MG/1; MG/1; MG/1
1 TABLET ORAL DAILY
Qty: 30 TABLET | Refills: 0 | Status: SHIPPED | OUTPATIENT
Start: 2023-04-21 | End: 2023-06-06 | Stop reason: SDUPTHER

## 2023-04-21 RX ORDER — DEXTROAMPHETAMINE SACCHARATE, AMPHETAMINE ASPARTATE MONOHYDRATE, DEXTROAMPHETAMINE SULFATE AND AMPHETAMINE SULFATE 7.5; 7.5; 7.5; 7.5 MG/1; MG/1; MG/1; MG/1
30 CAPSULE, EXTENDED RELEASE ORAL EVERY MORNING
Qty: 30 CAPSULE | Refills: 0 | Status: SHIPPED | OUTPATIENT
Start: 2023-04-21 | End: 2023-05-21

## 2023-04-21 RX ORDER — DEXTROAMPHETAMINE SACCHARATE, AMPHETAMINE ASPARTATE, DEXTROAMPHETAMINE SULFATE AND AMPHETAMINE SULFATE 2.5; 2.5; 2.5; 2.5 MG/1; MG/1; MG/1; MG/1
1 TABLET ORAL DAILY
Qty: 30 TABLET | Refills: 0 | Status: SHIPPED | OUTPATIENT
Start: 2023-06-20 | End: 2023-07-20

## 2023-04-21 RX ORDER — DEXTROAMPHETAMINE SACCHARATE, AMPHETAMINE ASPARTATE MONOHYDRATE, DEXTROAMPHETAMINE SULFATE AND AMPHETAMINE SULFATE 6.25; 6.25; 6.25; 6.25 MG/1; MG/1; MG/1; MG/1
CAPSULE, EXTENDED RELEASE ORAL
Status: CANCELLED | OUTPATIENT
Start: 2023-04-21

## 2023-04-21 RX ORDER — DEXTROAMPHETAMINE SACCHARATE, AMPHETAMINE ASPARTATE MONOHYDRATE, DEXTROAMPHETAMINE SULFATE AND AMPHETAMINE SULFATE 6.25; 6.25; 6.25; 6.25 MG/1; MG/1; MG/1; MG/1
CAPSULE, EXTENDED RELEASE ORAL
COMMUNITY
End: 2023-04-21

## 2023-04-21 RX ORDER — DEXTROAMPHETAMINE SACCHARATE, AMPHETAMINE ASPARTATE MONOHYDRATE, DEXTROAMPHETAMINE SULFATE AND AMPHETAMINE SULFATE 7.5; 7.5; 7.5; 7.5 MG/1; MG/1; MG/1; MG/1
30 CAPSULE, EXTENDED RELEASE ORAL EVERY MORNING
Qty: 30 CAPSULE | Refills: 0 | Status: SHIPPED | OUTPATIENT
Start: 2023-05-21 | End: 2023-06-06 | Stop reason: SDUPTHER

## 2023-04-21 NOTE — PROGRESS NOTES
SUBJECTIVE:    Patient ID: Owen Gonzalez is a 37 y.o. male.    Chief Complaint: Gastroesophageal Reflux (3mth, went over meds verbally// SW)    37-year-old male presents today for follow-up regarding GERD and ADHD.  Up-to-date on all screening measures and labs.  His blood pressure is looking much better today.  He reports doing well but finding that the 25XR wears off prior to lunch. Requests a slight increase in dose.      Admission on 12/12/2022, Discharged on 12/12/2022   Component Date Value Ref Range Status    WBC 12/12/2022 9.37  3.90 - 12.70 K/uL Final    RBC 12/12/2022 5.12  4.60 - 6.20 M/uL Final    Hemoglobin 12/12/2022 16.3  14.0 - 18.0 g/dL Final    Hematocrit 12/12/2022 48.2  40.0 - 54.0 % Final    MCV 12/12/2022 94  82 - 98 fL Final    MCH 12/12/2022 31.8 (H)  27.0 - 31.0 pg Final    MCHC 12/12/2022 33.8  32.0 - 36.0 g/dL Final    RDW 12/12/2022 11.9  11.5 - 14.5 % Final    Platelets 12/12/2022 243  150 - 450 K/uL Final    MPV 12/12/2022 10.3  9.2 - 12.9 fL Final    Immature Granulocytes 12/12/2022 0.4  0.0 - 0.5 % Final    Gran # (ANC) 12/12/2022 6.5  1.8 - 7.7 K/uL Final    Immature Grans (Abs) 12/12/2022 0.04  0.00 - 0.04 K/uL Final    Lymph # 12/12/2022 2.2  1.0 - 4.8 K/uL Final    Mono # 12/12/2022 0.5  0.3 - 1.0 K/uL Final    Eos # 12/12/2022 0.1  0.0 - 0.5 K/uL Final    Baso # 12/12/2022 0.06  0.00 - 0.20 K/uL Final    nRBC 12/12/2022 0  0 /100 WBC Final    Gran % 12/12/2022 69.6  38.0 - 73.0 % Final    Lymph % 12/12/2022 23.1  18.0 - 48.0 % Final    Mono % 12/12/2022 5.1  4.0 - 15.0 % Final    Eosinophil % 12/12/2022 1.2  0.0 - 8.0 % Final    Basophil % 12/12/2022 0.6  0.0 - 1.9 % Final    Differential Method 12/12/2022 Automated   Final    Sodium 12/12/2022 137  136 - 145 mmol/L Final    Potassium 12/12/2022 3.7  3.5 - 5.1 mmol/L Final    Chloride 12/12/2022 99  95 - 110 mmol/L Final    CO2 12/12/2022 31 (H)  23 - 29 mmol/L Final    Glucose 12/12/2022 92  70 - 110 mg/dL Final    BUN  12/12/2022 9  6 - 20 mg/dL Final    Creatinine 12/12/2022 1.0  0.5 - 1.4 mg/dL Final    Calcium 12/12/2022 9.5  8.7 - 10.5 mg/dL Final    Total Protein 12/12/2022 8.5 (H)  6.0 - 8.4 g/dL Final    Albumin 12/12/2022 4.7  3.5 - 5.2 g/dL Final    Total Bilirubin 12/12/2022 1.2 (H)  0.1 - 1.0 mg/dL Final    Alkaline Phosphatase 12/12/2022 65  55 - 135 U/L Final    AST 12/12/2022 32  10 - 40 U/L Final    ALT 12/12/2022 31  10 - 44 U/L Final    Anion Gap 12/12/2022 7 (L)  8 - 16 mmol/L Final    eGFR 12/12/2022 >60.0  >60 mL/min/1.73 m^2 Final    Troponin I High Sensitivity 12/12/2022 3.2  0.0 - 14.9 pg/mL Final    BNP 12/12/2022 6  0 - 99 pg/mL Final    D-Dimer 12/12/2022 0.36  <0.50 ug/mL FEU Final       Past Medical History:   Diagnosis Date    ADHD      Past Surgical History:   Procedure Laterality Date    ANKLE FRACTURE SURGERY Left 2008    left ankle surgery 2008      OPEN REDUCTION AND INTERNAL FIXATION (ORIF) OF FRACTURE OF CLAVICLE Right 8/18/2020    Procedure: ORIF, FRACTURE, CLAVICLE;  Surgeon: Murtaza Adkins Jr., MD;  Location: [facility];  Service: Orthopedics;  Laterality: Right;  Accumed     Family History   Problem Relation Age of Onset    Diabetes Maternal Grandfather        Marital Status: Single  Alcohol History:  reports current alcohol use.  Tobacco History:  reports that he has quit smoking. He has been exposed to tobacco smoke. He quit smokeless tobacco use about 7 years ago.  Drug History:  reports no history of drug use.    Review of patient's allergies indicates:  No Known Allergies    Current Outpatient Medications:     dextroamphetamine-amphetamine (ADDERALL XR) 30 MG 24 hr capsule, Take 1 capsule (30 mg total) by mouth every morning., Disp: 30 capsule, Rfl: 0    [START ON 5/21/2023] dextroamphetamine-amphetamine (ADDERALL XR) 30 MG 24 hr capsule, Take 1 capsule (30 mg total) by mouth every morning., Disp: 30 capsule, Rfl: 0    [START ON 6/20/2023] dextroamphetamine-amphetamine (ADDERALL XR) 30 MG  "24 hr capsule, Take 1 capsule (30 mg total) by mouth every morning., Disp: 30 capsule, Rfl: 0    dextroamphetamine-amphetamine 10 mg Tab, Take 1 tablet (10 mg total) by mouth once daily., Disp: 30 tablet, Rfl: 0    [START ON 5/21/2023] dextroamphetamine-amphetamine 10 mg Tab, Take 1 tablet (10 mg total) by mouth once daily., Disp: 30 tablet, Rfl: 0    [START ON 6/20/2023] dextroamphetamine-amphetamine 10 mg Tab, Take 1 tablet (10 mg total) by mouth once daily., Disp: 30 tablet, Rfl: 0    pantoprazole (PROTONIX) 40 MG tablet, Take 1 tablet (40 mg total) by mouth once daily., Disp: 30 tablet, Rfl: 5    Review of Systems   Constitutional:  Negative for activity change, fatigue, fever and unexpected weight change.   HENT:  Negative for congestion.    Respiratory:  Negative for apnea, cough, chest tightness and shortness of breath.    Cardiovascular:  Negative for chest pain and palpitations.   Gastrointestinal:  Negative for abdominal distention and abdominal pain.   Genitourinary:  Negative for difficulty urinating and dysuria.   Musculoskeletal:  Negative for arthralgias and back pain.   Neurological:  Negative for dizziness and weakness.        Objective:      Vitals:    04/21/23 0804   BP: 116/84   Pulse: 72   Weight: 98.4 kg (217 lb)   Height: 5' 9" (1.753 m)     Physical Exam  Constitutional:       General: He is not in acute distress.     Appearance: He is well-developed.   HENT:      Head: Normocephalic and atraumatic.   Eyes:      Pupils: Pupils are equal, round, and reactive to light.   Neck:      Thyroid: No thyromegaly.   Cardiovascular:      Rate and Rhythm: Normal rate and regular rhythm.      Heart sounds: Normal heart sounds.   Pulmonary:      Effort: Pulmonary effort is normal.      Breath sounds: Normal breath sounds.   Abdominal:      General: Bowel sounds are normal. There is no distension.      Palpations: Abdomen is soft.      Tenderness: There is no abdominal tenderness.   Musculoskeletal:         " General: Normal range of motion.      Cervical back: Normal range of motion and neck supple.   Skin:     General: Skin is warm and dry.      Findings: No erythema or rash.   Neurological:      Mental Status: He is alert and oriented to person, place, and time.      Cranial Nerves: No cranial nerve deficit.         Assessment:       1. Attention deficit hyperactivity disorder (ADHD), unspecified ADHD type    2. Gastroesophageal reflux disease, unspecified whether esophagitis present         Plan:       Attention deficit hyperactivity disorder (ADHD), unspecified ADHD type  Comments:  Changing dosage form to 25mg XR and 10mg afternoon if needed. Labs today. will let me know about efficacy and can refill if appropriate  Orders:  -     dextroamphetamine-amphetamine (ADDERALL XR) 30 MG 24 hr capsule; Take 1 capsule (30 mg total) by mouth every morning.  Dispense: 30 capsule; Refill: 0  -     dextroamphetamine-amphetamine (ADDERALL XR) 30 MG 24 hr capsule; Take 1 capsule (30 mg total) by mouth every morning.  Dispense: 30 capsule; Refill: 0  -     dextroamphetamine-amphetamine (ADDERALL XR) 30 MG 24 hr capsule; Take 1 capsule (30 mg total) by mouth every morning.  Dispense: 30 capsule; Refill: 0  -     dextroamphetamine-amphetamine 10 mg Tab; Take 1 tablet (10 mg total) by mouth once daily.  Dispense: 30 tablet; Refill: 0  -     dextroamphetamine-amphetamine 10 mg Tab; Take 1 tablet (10 mg total) by mouth once daily.  Dispense: 30 tablet; Refill: 0  -     dextroamphetamine-amphetamine 10 mg Tab; Take 1 tablet (10 mg total) by mouth once daily.  Dispense: 30 tablet; Refill: 0    Gastroesophageal reflux disease, unspecified whether esophagitis present  Comments:  add PPI today. discussed with pt proper dosing. f/u in 2-3 mos to discuss efficacy.  Orders:  -     pantoprazole (PROTONIX) 40 MG tablet; Take 1 tablet (40 mg total) by mouth once daily.  Dispense: 30 tablet; Refill: 5      Follow up in about 6 months (around  10/21/2023).        4/21/2023 Johnathan Gill PA-C

## 2023-06-06 DIAGNOSIS — F90.9 ATTENTION DEFICIT HYPERACTIVITY DISORDER (ADHD), UNSPECIFIED ADHD TYPE: ICD-10-CM

## 2023-06-06 RX ORDER — DEXTROAMPHETAMINE SACCHARATE, AMPHETAMINE ASPARTATE, DEXTROAMPHETAMINE SULFATE AND AMPHETAMINE SULFATE 2.5; 2.5; 2.5; 2.5 MG/1; MG/1; MG/1; MG/1
1 TABLET ORAL DAILY
Qty: 30 TABLET | Refills: 0 | Status: SHIPPED | OUTPATIENT
Start: 2023-06-06 | End: 2023-10-02 | Stop reason: SDUPTHER

## 2023-06-06 RX ORDER — DEXTROAMPHETAMINE SACCHARATE, AMPHETAMINE ASPARTATE MONOHYDRATE, DEXTROAMPHETAMINE SULFATE AND AMPHETAMINE SULFATE 7.5; 7.5; 7.5; 7.5 MG/1; MG/1; MG/1; MG/1
30 CAPSULE, EXTENDED RELEASE ORAL EVERY MORNING
Qty: 30 CAPSULE | Refills: 0 | Status: SHIPPED | OUTPATIENT
Start: 2023-06-06 | End: 2023-07-06

## 2023-07-27 DIAGNOSIS — F90.9 ATTENTION DEFICIT HYPERACTIVITY DISORDER (ADHD), UNSPECIFIED ADHD TYPE: ICD-10-CM

## 2023-07-27 DIAGNOSIS — K21.9 GASTROESOPHAGEAL REFLUX DISEASE, UNSPECIFIED WHETHER ESOPHAGITIS PRESENT: ICD-10-CM

## 2023-07-27 RX ORDER — DEXTROAMPHETAMINE SACCHARATE, AMPHETAMINE ASPARTATE, DEXTROAMPHETAMINE SULFATE AND AMPHETAMINE SULFATE 2.5; 2.5; 2.5; 2.5 MG/1; MG/1; MG/1; MG/1
1 TABLET ORAL DAILY
Qty: 30 TABLET | Refills: 0 | Status: SHIPPED | OUTPATIENT
Start: 2023-07-27 | End: 2023-08-29 | Stop reason: SDUPTHER

## 2023-07-27 RX ORDER — PANTOPRAZOLE SODIUM 40 MG/1
40 TABLET, DELAYED RELEASE ORAL DAILY
Qty: 30 TABLET | Refills: 5 | Status: SHIPPED | OUTPATIENT
Start: 2023-07-27 | End: 2023-11-14 | Stop reason: SDUPTHER

## 2023-07-27 RX ORDER — DEXTROAMPHETAMINE SACCHARATE, AMPHETAMINE ASPARTATE MONOHYDRATE, DEXTROAMPHETAMINE SULFATE AND AMPHETAMINE SULFATE 7.5; 7.5; 7.5; 7.5 MG/1; MG/1; MG/1; MG/1
30 CAPSULE, EXTENDED RELEASE ORAL EVERY MORNING
Qty: 30 CAPSULE | Refills: 0 | Status: SHIPPED | OUTPATIENT
Start: 2023-07-27 | End: 2023-08-29 | Stop reason: SDUPTHER

## 2023-10-02 DIAGNOSIS — F90.9 ATTENTION DEFICIT HYPERACTIVITY DISORDER (ADHD), UNSPECIFIED ADHD TYPE: ICD-10-CM

## 2023-10-02 RX ORDER — DEXTROAMPHETAMINE SACCHARATE, AMPHETAMINE ASPARTATE MONOHYDRATE, DEXTROAMPHETAMINE SULFATE AND AMPHETAMINE SULFATE 7.5; 7.5; 7.5; 7.5 MG/1; MG/1; MG/1; MG/1
30 CAPSULE, EXTENDED RELEASE ORAL EVERY MORNING
Qty: 30 CAPSULE | Refills: 0 | Status: SHIPPED | OUTPATIENT
Start: 2023-10-02 | End: 2023-10-23 | Stop reason: SDUPTHER

## 2023-10-02 RX ORDER — DEXTROAMPHETAMINE SACCHARATE, AMPHETAMINE ASPARTATE, DEXTROAMPHETAMINE SULFATE AND AMPHETAMINE SULFATE 2.5; 2.5; 2.5; 2.5 MG/1; MG/1; MG/1; MG/1
1 TABLET ORAL DAILY
Qty: 30 TABLET | Refills: 0 | Status: SHIPPED | OUTPATIENT
Start: 2023-10-02 | End: 2023-10-23 | Stop reason: SDUPTHER

## 2023-10-02 NOTE — TELEPHONE ENCOUNTER
Pt is needing a refill on his Adderall XR 30 mg and 10 mg. Last office visit 04/21/2023. Next office visit 10/23/2023. Chart shows last dispense 08/30/2023. Last UDS done on 05/06/2022.

## 2023-10-23 ENCOUNTER — OFFICE VISIT (OUTPATIENT)
Dept: FAMILY MEDICINE | Facility: CLINIC | Age: 38
End: 2023-10-23
Payer: COMMERCIAL

## 2023-10-23 VITALS
HEART RATE: 73 BPM | HEIGHT: 69 IN | WEIGHT: 216 LBS | BODY MASS INDEX: 31.99 KG/M2 | OXYGEN SATURATION: 97 % | SYSTOLIC BLOOD PRESSURE: 126 MMHG | DIASTOLIC BLOOD PRESSURE: 72 MMHG

## 2023-10-23 DIAGNOSIS — Z23 NEED FOR VACCINATION: ICD-10-CM

## 2023-10-23 DIAGNOSIS — Z79.899 ENCOUNTER FOR LONG-TERM (CURRENT) USE OF OTHER MEDICATIONS: ICD-10-CM

## 2023-10-23 DIAGNOSIS — F90.9 ATTENTION DEFICIT HYPERACTIVITY DISORDER (ADHD), UNSPECIFIED ADHD TYPE: Primary | ICD-10-CM

## 2023-10-23 PROCEDURE — 99395 PR PREVENTIVE VISIT,EST,18-39: ICD-10-PCS | Mod: 25,S$GLB,, | Performed by: PHYSICIAN ASSISTANT

## 2023-10-23 PROCEDURE — 99395 PREV VISIT EST AGE 18-39: CPT | Mod: 25,S$GLB,, | Performed by: PHYSICIAN ASSISTANT

## 2023-10-23 PROCEDURE — 90471 IMMUNIZATION ADMIN: CPT | Mod: S$GLB,,, | Performed by: PHYSICIAN ASSISTANT

## 2023-10-23 PROCEDURE — 3074F SYST BP LT 130 MM HG: CPT | Mod: CPTII,S$GLB,, | Performed by: PHYSICIAN ASSISTANT

## 2023-10-23 PROCEDURE — 90686 IIV4 VACC NO PRSV 0.5 ML IM: CPT | Mod: S$GLB,,, | Performed by: PHYSICIAN ASSISTANT

## 2023-10-23 PROCEDURE — 3074F PR MOST RECENT SYSTOLIC BLOOD PRESSURE < 130 MM HG: ICD-10-PCS | Mod: CPTII,S$GLB,, | Performed by: PHYSICIAN ASSISTANT

## 2023-10-23 PROCEDURE — 3008F PR BODY MASS INDEX (BMI) DOCUMENTED: ICD-10-PCS | Mod: CPTII,S$GLB,, | Performed by: PHYSICIAN ASSISTANT

## 2023-10-23 PROCEDURE — 90471 FLU VACCINE (QUAD) GREATER THAN OR EQUAL TO 3YO PRESERVATIVE FREE IM: ICD-10-PCS | Mod: S$GLB,,, | Performed by: PHYSICIAN ASSISTANT

## 2023-10-23 PROCEDURE — 3078F DIAST BP <80 MM HG: CPT | Mod: CPTII,S$GLB,, | Performed by: PHYSICIAN ASSISTANT

## 2023-10-23 PROCEDURE — 90686 FLU VACCINE (QUAD) GREATER THAN OR EQUAL TO 3YO PRESERVATIVE FREE IM: ICD-10-PCS | Mod: S$GLB,,, | Performed by: PHYSICIAN ASSISTANT

## 2023-10-23 PROCEDURE — 1159F PR MEDICATION LIST DOCUMENTED IN MEDICAL RECORD: ICD-10-PCS | Mod: CPTII,S$GLB,, | Performed by: PHYSICIAN ASSISTANT

## 2023-10-23 PROCEDURE — 3008F BODY MASS INDEX DOCD: CPT | Mod: CPTII,S$GLB,, | Performed by: PHYSICIAN ASSISTANT

## 2023-10-23 PROCEDURE — 3078F PR MOST RECENT DIASTOLIC BLOOD PRESSURE < 80 MM HG: ICD-10-PCS | Mod: CPTII,S$GLB,, | Performed by: PHYSICIAN ASSISTANT

## 2023-10-23 PROCEDURE — 1159F MED LIST DOCD IN RCRD: CPT | Mod: CPTII,S$GLB,, | Performed by: PHYSICIAN ASSISTANT

## 2023-10-23 RX ORDER — DEXTROAMPHETAMINE SACCHARATE, AMPHETAMINE ASPARTATE, DEXTROAMPHETAMINE SULFATE AND AMPHETAMINE SULFATE 2.5; 2.5; 2.5; 2.5 MG/1; MG/1; MG/1; MG/1
1 TABLET ORAL DAILY
Qty: 30 TABLET | Refills: 0 | Status: SHIPPED | OUTPATIENT
Start: 2023-12-23 | End: 2023-11-14

## 2023-10-23 RX ORDER — DEXTROAMPHETAMINE SACCHARATE, AMPHETAMINE ASPARTATE, DEXTROAMPHETAMINE SULFATE AND AMPHETAMINE SULFATE 2.5; 2.5; 2.5; 2.5 MG/1; MG/1; MG/1; MG/1
1 TABLET ORAL DAILY
Qty: 30 TABLET | Refills: 0 | Status: SHIPPED | OUTPATIENT
Start: 2023-10-23 | End: 2023-11-14

## 2023-10-23 RX ORDER — DEXTROAMPHETAMINE SACCHARATE, AMPHETAMINE ASPARTATE, DEXTROAMPHETAMINE SULFATE AND AMPHETAMINE SULFATE 2.5; 2.5; 2.5; 2.5 MG/1; MG/1; MG/1; MG/1
1 TABLET ORAL DAILY
Qty: 30 TABLET | Refills: 0 | Status: SHIPPED | OUTPATIENT
Start: 2023-11-23 | End: 2023-11-14

## 2023-10-23 RX ORDER — DEXTROAMPHETAMINE SACCHARATE, AMPHETAMINE ASPARTATE MONOHYDRATE, DEXTROAMPHETAMINE SULFATE AND AMPHETAMINE SULFATE 7.5; 7.5; 7.5; 7.5 MG/1; MG/1; MG/1; MG/1
30 CAPSULE, EXTENDED RELEASE ORAL EVERY MORNING
Qty: 30 CAPSULE | Refills: 0 | Status: SHIPPED | OUTPATIENT
Start: 2023-11-23 | End: 2023-11-14

## 2023-10-23 RX ORDER — DEXTROAMPHETAMINE SACCHARATE, AMPHETAMINE ASPARTATE MONOHYDRATE, DEXTROAMPHETAMINE SULFATE AND AMPHETAMINE SULFATE 7.5; 7.5; 7.5; 7.5 MG/1; MG/1; MG/1; MG/1
30 CAPSULE, EXTENDED RELEASE ORAL EVERY MORNING
Qty: 30 CAPSULE | Refills: 0 | Status: SHIPPED | OUTPATIENT
Start: 2023-12-23 | End: 2023-11-14

## 2023-10-23 RX ORDER — DEXTROAMPHETAMINE SACCHARATE, AMPHETAMINE ASPARTATE MONOHYDRATE, DEXTROAMPHETAMINE SULFATE AND AMPHETAMINE SULFATE 7.5; 7.5; 7.5; 7.5 MG/1; MG/1; MG/1; MG/1
30 CAPSULE, EXTENDED RELEASE ORAL EVERY MORNING
Qty: 30 CAPSULE | Refills: 0 | Status: SHIPPED | OUTPATIENT
Start: 2023-10-23 | End: 2023-11-14

## 2023-10-23 NOTE — PROGRESS NOTES
SUBJECTIVE:    Patient ID: Owen Gonzalez is a 38 y.o. male.    Chief Complaint: Follow-up (No bottles, Flu ordered, No complaints-BA )    This is a 38-year-old male who presents today for regular follow-up. Reports that he has been doing well with current adderall dose. The second 10mg dose does seem to be helping. No SE to note. Does need labs today for ongoing pt care.        No visits with results within 6 Month(s) from this visit.   Latest known visit with results is:   Admission on 12/12/2022, Discharged on 12/12/2022   Component Date Value Ref Range Status    WBC 12/12/2022 9.37  3.90 - 12.70 K/uL Final    RBC 12/12/2022 5.12  4.60 - 6.20 M/uL Final    Hemoglobin 12/12/2022 16.3  14.0 - 18.0 g/dL Final    Hematocrit 12/12/2022 48.2  40.0 - 54.0 % Final    MCV 12/12/2022 94  82 - 98 fL Final    MCH 12/12/2022 31.8 (H)  27.0 - 31.0 pg Final    MCHC 12/12/2022 33.8  32.0 - 36.0 g/dL Final    RDW 12/12/2022 11.9  11.5 - 14.5 % Final    Platelets 12/12/2022 243  150 - 450 K/uL Final    MPV 12/12/2022 10.3  9.2 - 12.9 fL Final    Immature Granulocytes 12/12/2022 0.4  0.0 - 0.5 % Final    Gran # (ANC) 12/12/2022 6.5  1.8 - 7.7 K/uL Final    Immature Grans (Abs) 12/12/2022 0.04  0.00 - 0.04 K/uL Final    Lymph # 12/12/2022 2.2  1.0 - 4.8 K/uL Final    Mono # 12/12/2022 0.5  0.3 - 1.0 K/uL Final    Eos # 12/12/2022 0.1  0.0 - 0.5 K/uL Final    Baso # 12/12/2022 0.06  0.00 - 0.20 K/uL Final    nRBC 12/12/2022 0  0 /100 WBC Final    Gran % 12/12/2022 69.6  38.0 - 73.0 % Final    Lymph % 12/12/2022 23.1  18.0 - 48.0 % Final    Mono % 12/12/2022 5.1  4.0 - 15.0 % Final    Eosinophil % 12/12/2022 1.2  0.0 - 8.0 % Final    Basophil % 12/12/2022 0.6  0.0 - 1.9 % Final    Differential Method 12/12/2022 Automated   Final    Sodium 12/12/2022 137  136 - 145 mmol/L Final    Potassium 12/12/2022 3.7  3.5 - 5.1 mmol/L Final    Chloride 12/12/2022 99  95 - 110 mmol/L Final    CO2 12/12/2022 31 (H)  23 - 29 mmol/L Final     Glucose 12/12/2022 92  70 - 110 mg/dL Final    BUN 12/12/2022 9  6 - 20 mg/dL Final    Creatinine 12/12/2022 1.0  0.5 - 1.4 mg/dL Final    Calcium 12/12/2022 9.5  8.7 - 10.5 mg/dL Final    Total Protein 12/12/2022 8.5 (H)  6.0 - 8.4 g/dL Final    Albumin 12/12/2022 4.7  3.5 - 5.2 g/dL Final    Total Bilirubin 12/12/2022 1.2 (H)  0.1 - 1.0 mg/dL Final    Alkaline Phosphatase 12/12/2022 65  55 - 135 U/L Final    AST 12/12/2022 32  10 - 40 U/L Final    ALT 12/12/2022 31  10 - 44 U/L Final    Anion Gap 12/12/2022 7 (L)  8 - 16 mmol/L Final    eGFR 12/12/2022 >60.0  >60 mL/min/1.73 m^2 Final    Troponin I High Sensitivity 12/12/2022 3.2  0.0 - 14.9 pg/mL Final    BNP 12/12/2022 6  0 - 99 pg/mL Final    D-Dimer 12/12/2022 0.36  <0.50 ug/mL FEU Final       Past Medical History:   Diagnosis Date    ADHD      Past Surgical History:   Procedure Laterality Date    ANKLE FRACTURE SURGERY Left 2008    left ankle surgery 2008      OPEN REDUCTION AND INTERNAL FIXATION (ORIF) OF FRACTURE OF CLAVICLE Right 8/18/2020    Procedure: ORIF, FRACTURE, CLAVICLE;  Surgeon: Murtaza Adkins Jr., MD;  Location: [facility];  Service: Orthopedics;  Laterality: Right;  Accumed     Family History   Problem Relation Age of Onset    Diabetes Maternal Grandfather        Marital Status: Single  Alcohol History:  reports current alcohol use.  Tobacco History:  reports that he has quit smoking. He has been exposed to tobacco smoke. He quit smokeless tobacco use about 8 years ago.  Drug History:  reports no history of drug use.    Review of patient's allergies indicates:  No Known Allergies    Current Outpatient Medications:     pantoprazole (PROTONIX) 40 MG tablet, Take 1 tablet (40 mg total) by mouth once daily., Disp: 30 tablet, Rfl: 5    dextroamphetamine-amphetamine (ADDERALL XR) 30 MG 24 hr capsule, Take 1 capsule (30 mg total) by mouth every morning., Disp: 30 capsule, Rfl: 0    [START ON 11/23/2023] dextroamphetamine-amphetamine (ADDERALL XR) 30 MG  "24 hr capsule, Take 1 capsule (30 mg total) by mouth every morning., Disp: 30 capsule, Rfl: 0    [START ON 12/23/2023] dextroamphetamine-amphetamine (ADDERALL XR) 30 MG 24 hr capsule, Take 1 capsule (30 mg total) by mouth every morning., Disp: 30 capsule, Rfl: 0    dextroamphetamine-amphetamine 10 mg Tab, Take 1 tablet (10 mg total) by mouth once daily., Disp: 30 tablet, Rfl: 0    [START ON 11/23/2023] dextroamphetamine-amphetamine 10 mg Tab, Take 1 tablet (10 mg total) by mouth once daily., Disp: 30 tablet, Rfl: 0    [START ON 12/23/2023] dextroamphetamine-amphetamine 10 mg Tab, Take 1 tablet (10 mg total) by mouth once daily., Disp: 30 tablet, Rfl: 0    Review of Systems   Constitutional:  Negative for activity change, fatigue, fever and unexpected weight change.   HENT:  Negative for congestion.    Respiratory:  Negative for apnea, cough, chest tightness and shortness of breath.    Cardiovascular:  Negative for chest pain and palpitations.   Gastrointestinal:  Negative for abdominal distention and abdominal pain.   Genitourinary:  Negative for difficulty urinating and dysuria.   Musculoskeletal:  Negative for arthralgias and back pain.   Neurological:  Negative for dizziness and weakness.          Objective:      Vitals:    10/23/23 0808   BP: 126/72   Pulse: 73   SpO2: 97%   Weight: 98 kg (216 lb)   Height: 5' 9" (1.753 m)     Physical Exam  Constitutional:       General: He is not in acute distress.     Appearance: He is well-developed.   HENT:      Head: Normocephalic and atraumatic.   Eyes:      Pupils: Pupils are equal, round, and reactive to light.   Neck:      Thyroid: No thyromegaly.   Cardiovascular:      Rate and Rhythm: Normal rate and regular rhythm.      Heart sounds: Normal heart sounds.   Pulmonary:      Effort: Pulmonary effort is normal.      Breath sounds: Normal breath sounds.   Abdominal:      General: Bowel sounds are normal. There is no distension.      Palpations: Abdomen is soft.      " Tenderness: There is no abdominal tenderness.   Musculoskeletal:         General: Normal range of motion.      Cervical back: Normal range of motion and neck supple.   Skin:     General: Skin is warm and dry.      Findings: No erythema or rash.   Neurological:      Mental Status: He is alert and oriented to person, place, and time.      Cranial Nerves: No cranial nerve deficit.           Assessment:       1. Attention deficit hyperactivity disorder (ADHD), unspecified ADHD type    2. Need for vaccination    3. Encounter for long-term (current) use of other medications         Plan:       Attention deficit hyperactivity disorder (ADHD), unspecified ADHD type  Comments:  Doing MUCH better on current dosage regemin. will refill as needed.  Orders:  -     dextroamphetamine-amphetamine (ADDERALL XR) 30 MG 24 hr capsule; Take 1 capsule (30 mg total) by mouth every morning.  Dispense: 30 capsule; Refill: 0  -     dextroamphetamine-amphetamine (ADDERALL XR) 30 MG 24 hr capsule; Take 1 capsule (30 mg total) by mouth every morning.  Dispense: 30 capsule; Refill: 0  -     dextroamphetamine-amphetamine (ADDERALL XR) 30 MG 24 hr capsule; Take 1 capsule (30 mg total) by mouth every morning.  Dispense: 30 capsule; Refill: 0  -     dextroamphetamine-amphetamine 10 mg Tab; Take 1 tablet (10 mg total) by mouth once daily.  Dispense: 30 tablet; Refill: 0  -     dextroamphetamine-amphetamine 10 mg Tab; Take 1 tablet (10 mg total) by mouth once daily.  Dispense: 30 tablet; Refill: 0  -     dextroamphetamine-amphetamine 10 mg Tab; Take 1 tablet (10 mg total) by mouth once daily.  Dispense: 30 tablet; Refill: 0    Need for vaccination  -     Influenza - Quadrivalent *Preferred* (6 months+) (PF)    Encounter for long-term (current) use of other medications  Comments:  Labs today for ongoing pt care.   Orders:  -     CBC Auto Differential; Future; Expected date: 10/23/2023  -     Comprehensive Metabolic Panel; Future; Expected date:  10/23/2023  -     Lipid Panel; Future; Expected date: 10/23/2023  -     TSH; Future; Expected date: 10/23/2023  -     Urinalysis, Reflex to Urine Culture Urine, Clean Catch; Future; Expected date: 10/23/2023      Follow up in about 6 months (around 4/23/2024).        10/23/2023 Johnathan Gill PA-C

## 2023-10-24 LAB
ALBUMIN SERPL-MCNC: 4.5 G/DL (ref 3.6–5.1)
ALBUMIN/GLOB SERPL: 1.4 (CALC) (ref 1–2.5)
ALP SERPL-CCNC: 62 U/L (ref 36–130)
ALT SERPL-CCNC: 21 U/L (ref 9–46)
APPEARANCE UR: CLEAR
AST SERPL-CCNC: 15 U/L (ref 10–40)
BACTERIA #/AREA URNS HPF: NORMAL /HPF
BACTERIA UR CULT: NORMAL
BASOPHILS # BLD AUTO: 58 CELLS/UL (ref 0–200)
BASOPHILS NFR BLD AUTO: 0.8 %
BILIRUB SERPL-MCNC: 0.5 MG/DL (ref 0.2–1.2)
BILIRUB UR QL STRIP: NEGATIVE
BUN SERPL-MCNC: 9 MG/DL (ref 7–25)
BUN/CREAT SERPL: NORMAL (CALC) (ref 6–22)
CALCIUM SERPL-MCNC: 9.7 MG/DL (ref 8.6–10.3)
CHLORIDE SERPL-SCNC: 104 MMOL/L (ref 98–110)
CHOLEST SERPL-MCNC: 199 MG/DL
CHOLEST/HDLC SERPL: 2.9 (CALC)
CO2 SERPL-SCNC: 28 MMOL/L (ref 20–32)
COLOR UR: NORMAL
CREAT SERPL-MCNC: 0.92 MG/DL (ref 0.6–1.26)
EGFR: 110 ML/MIN/1.73M2
EOSINOPHIL # BLD AUTO: 153 CELLS/UL (ref 15–500)
EOSINOPHIL NFR BLD AUTO: 2.1 %
ERYTHROCYTE [DISTWIDTH] IN BLOOD BY AUTOMATED COUNT: 11.6 % (ref 11–15)
GLOBULIN SER CALC-MCNC: 3.2 G/DL (CALC) (ref 1.9–3.7)
GLUCOSE SERPL-MCNC: 99 MG/DL (ref 65–99)
GLUCOSE UR QL STRIP: NEGATIVE
HCT VFR BLD AUTO: 48.5 % (ref 38.5–50)
HDLC SERPL-MCNC: 68 MG/DL
HGB BLD-MCNC: 15.9 G/DL (ref 13.2–17.1)
HGB UR QL STRIP: NEGATIVE
HYALINE CASTS #/AREA URNS LPF: NORMAL /LPF
KETONES UR QL STRIP: NEGATIVE
LDLC SERPL CALC-MCNC: 116 MG/DL (CALC)
LEUKOCYTE ESTERASE UR QL STRIP: NEGATIVE
LYMPHOCYTES # BLD AUTO: 1716 CELLS/UL (ref 850–3900)
LYMPHOCYTES NFR BLD AUTO: 23.5 %
MCH RBC QN AUTO: 31.4 PG (ref 27–33)
MCHC RBC AUTO-ENTMCNC: 32.8 G/DL (ref 32–36)
MCV RBC AUTO: 95.7 FL (ref 80–100)
MONOCYTES # BLD AUTO: 475 CELLS/UL (ref 200–950)
MONOCYTES NFR BLD AUTO: 6.5 %
NEUTROPHILS # BLD AUTO: 4898 CELLS/UL (ref 1500–7800)
NEUTROPHILS NFR BLD AUTO: 67.1 %
NITRITE UR QL STRIP: NEGATIVE
NONHDLC SERPL-MCNC: 131 MG/DL (CALC)
PH UR STRIP: 5.5 [PH] (ref 5–8)
PLATELET # BLD AUTO: 224 THOUSAND/UL (ref 140–400)
PMV BLD REES-ECKER: 10.7 FL (ref 7.5–12.5)
POTASSIUM SERPL-SCNC: 4.6 MMOL/L (ref 3.5–5.3)
PROT SERPL-MCNC: 7.7 G/DL (ref 6.1–8.1)
PROT UR QL STRIP: NEGATIVE
RBC # BLD AUTO: 5.07 MILLION/UL (ref 4.2–5.8)
RBC #/AREA URNS HPF: NORMAL /HPF
SERVICE CMNT-IMP: NORMAL
SODIUM SERPL-SCNC: 139 MMOL/L (ref 135–146)
SP GR UR STRIP: 1.02 (ref 1–1.03)
SQUAMOUS #/AREA URNS HPF: NORMAL /HPF
TRIGL SERPL-MCNC: 62 MG/DL
TSH SERPL-ACNC: 1.49 MIU/L (ref 0.4–4.5)
WBC # BLD AUTO: 7.3 THOUSAND/UL (ref 3.8–10.8)
WBC #/AREA URNS HPF: NORMAL /HPF

## 2023-10-26 ENCOUNTER — PATIENT MESSAGE (OUTPATIENT)
Dept: FAMILY MEDICINE | Facility: CLINIC | Age: 38
End: 2023-10-26

## 2023-11-14 ENCOUNTER — PATIENT MESSAGE (OUTPATIENT)
Dept: FAMILY MEDICINE | Facility: CLINIC | Age: 38
End: 2023-11-14

## 2023-11-14 DIAGNOSIS — F90.9 ATTENTION DEFICIT HYPERACTIVITY DISORDER (ADHD), UNSPECIFIED ADHD TYPE: ICD-10-CM

## 2023-11-14 DIAGNOSIS — K21.9 GASTROESOPHAGEAL REFLUX DISEASE, UNSPECIFIED WHETHER ESOPHAGITIS PRESENT: ICD-10-CM

## 2023-11-14 RX ORDER — DEXTROAMPHETAMINE SACCHARATE, AMPHETAMINE ASPARTATE MONOHYDRATE, DEXTROAMPHETAMINE SULFATE AND AMPHETAMINE SULFATE 7.5; 7.5; 7.5; 7.5 MG/1; MG/1; MG/1; MG/1
30 CAPSULE, EXTENDED RELEASE ORAL EVERY MORNING
Qty: 30 CAPSULE | Refills: 0 | Status: SHIPPED | OUTPATIENT
Start: 2023-11-14 | End: 2023-12-26 | Stop reason: SDUPTHER

## 2023-11-14 RX ORDER — PANTOPRAZOLE SODIUM 40 MG/1
40 TABLET, DELAYED RELEASE ORAL DAILY
Qty: 30 TABLET | Refills: 5 | Status: SHIPPED | OUTPATIENT
Start: 2023-11-14 | End: 2024-05-12

## 2023-11-14 RX ORDER — DEXTROAMPHETAMINE SACCHARATE, AMPHETAMINE ASPARTATE MONOHYDRATE, DEXTROAMPHETAMINE SULFATE AND AMPHETAMINE SULFATE 7.5; 7.5; 7.5; 7.5 MG/1; MG/1; MG/1; MG/1
30 CAPSULE, EXTENDED RELEASE ORAL EVERY MORNING
Qty: 30 CAPSULE | Refills: 0 | Status: CANCELLED | OUTPATIENT
Start: 2023-11-14 | End: 2023-12-14

## 2023-11-14 RX ORDER — DEXTROAMPHETAMINE SACCHARATE, AMPHETAMINE ASPARTATE, DEXTROAMPHETAMINE SULFATE AND AMPHETAMINE SULFATE 2.5; 2.5; 2.5; 2.5 MG/1; MG/1; MG/1; MG/1
1 TABLET ORAL DAILY
Qty: 30 TABLET | Refills: 0 | Status: CANCELLED | OUTPATIENT
Start: 2023-11-14

## 2023-11-14 RX ORDER — DEXTROAMPHETAMINE SACCHARATE, AMPHETAMINE ASPARTATE, DEXTROAMPHETAMINE SULFATE AND AMPHETAMINE SULFATE 2.5; 2.5; 2.5; 2.5 MG/1; MG/1; MG/1; MG/1
1 TABLET ORAL DAILY
Qty: 30 TABLET | Refills: 0 | Status: SHIPPED | OUTPATIENT
Start: 2023-11-14 | End: 2023-12-26 | Stop reason: SDUPTHER

## 2023-12-26 DIAGNOSIS — F90.9 ATTENTION DEFICIT HYPERACTIVITY DISORDER (ADHD), UNSPECIFIED ADHD TYPE: ICD-10-CM

## 2023-12-26 RX ORDER — DEXTROAMPHETAMINE SACCHARATE, AMPHETAMINE ASPARTATE MONOHYDRATE, DEXTROAMPHETAMINE SULFATE AND AMPHETAMINE SULFATE 7.5; 7.5; 7.5; 7.5 MG/1; MG/1; MG/1; MG/1
30 CAPSULE, EXTENDED RELEASE ORAL EVERY MORNING
Qty: 30 CAPSULE | Refills: 0 | Status: SHIPPED | OUTPATIENT
Start: 2023-12-26 | End: 2024-02-01 | Stop reason: SDUPTHER

## 2023-12-26 RX ORDER — DEXTROAMPHETAMINE SACCHARATE, AMPHETAMINE ASPARTATE, DEXTROAMPHETAMINE SULFATE AND AMPHETAMINE SULFATE 2.5; 2.5; 2.5; 2.5 MG/1; MG/1; MG/1; MG/1
1 TABLET ORAL DAILY
Qty: 30 TABLET | Refills: 0 | Status: SHIPPED | OUTPATIENT
Start: 2023-12-26 | End: 2024-02-01 | Stop reason: SDUPTHER

## 2024-02-01 DIAGNOSIS — F90.9 ATTENTION DEFICIT HYPERACTIVITY DISORDER (ADHD), UNSPECIFIED ADHD TYPE: ICD-10-CM

## 2024-02-01 RX ORDER — DEXTROAMPHETAMINE SACCHARATE, AMPHETAMINE ASPARTATE, DEXTROAMPHETAMINE SULFATE AND AMPHETAMINE SULFATE 2.5; 2.5; 2.5; 2.5 MG/1; MG/1; MG/1; MG/1
1 TABLET ORAL DAILY
Qty: 30 TABLET | Refills: 0 | Status: SHIPPED | OUTPATIENT
Start: 2024-02-01 | End: 2024-03-02

## 2024-02-01 RX ORDER — DEXTROAMPHETAMINE SACCHARATE, AMPHETAMINE ASPARTATE MONOHYDRATE, DEXTROAMPHETAMINE SULFATE AND AMPHETAMINE SULFATE 7.5; 7.5; 7.5; 7.5 MG/1; MG/1; MG/1; MG/1
30 CAPSULE, EXTENDED RELEASE ORAL EVERY MORNING
Qty: 30 CAPSULE | Refills: 0 | Status: SHIPPED | OUTPATIENT
Start: 2024-02-01 | End: 2024-04-30 | Stop reason: SDUPTHER

## 2024-04-30 ENCOUNTER — OFFICE VISIT (OUTPATIENT)
Dept: FAMILY MEDICINE | Facility: CLINIC | Age: 39
End: 2024-04-30
Payer: COMMERCIAL

## 2024-04-30 VITALS
SYSTOLIC BLOOD PRESSURE: 138 MMHG | BODY MASS INDEX: 33.47 KG/M2 | HEART RATE: 98 BPM | WEIGHT: 226 LBS | DIASTOLIC BLOOD PRESSURE: 88 MMHG | OXYGEN SATURATION: 96 % | HEIGHT: 69 IN | RESPIRATION RATE: 18 BRPM

## 2024-04-30 DIAGNOSIS — F90.9 ATTENTION DEFICIT HYPERACTIVITY DISORDER (ADHD), UNSPECIFIED ADHD TYPE: ICD-10-CM

## 2024-04-30 DIAGNOSIS — K21.9 GASTROESOPHAGEAL REFLUX DISEASE, UNSPECIFIED WHETHER ESOPHAGITIS PRESENT: ICD-10-CM

## 2024-04-30 DIAGNOSIS — J40 BRONCHITIS: Primary | ICD-10-CM

## 2024-04-30 PROCEDURE — 1159F MED LIST DOCD IN RCRD: CPT | Mod: CPTII,S$GLB,, | Performed by: PHYSICIAN ASSISTANT

## 2024-04-30 PROCEDURE — 3075F SYST BP GE 130 - 139MM HG: CPT | Mod: CPTII,S$GLB,, | Performed by: PHYSICIAN ASSISTANT

## 2024-04-30 PROCEDURE — 3008F BODY MASS INDEX DOCD: CPT | Mod: CPTII,S$GLB,, | Performed by: PHYSICIAN ASSISTANT

## 2024-04-30 PROCEDURE — 99214 OFFICE O/P EST MOD 30 MIN: CPT | Mod: S$GLB,,, | Performed by: PHYSICIAN ASSISTANT

## 2024-04-30 PROCEDURE — 3079F DIAST BP 80-89 MM HG: CPT | Mod: CPTII,S$GLB,, | Performed by: PHYSICIAN ASSISTANT

## 2024-04-30 RX ORDER — DEXTROAMPHETAMINE SACCHARATE, AMPHETAMINE ASPARTATE MONOHYDRATE, DEXTROAMPHETAMINE SULFATE AND AMPHETAMINE SULFATE 7.5; 7.5; 7.5; 7.5 MG/1; MG/1; MG/1; MG/1
30 CAPSULE, EXTENDED RELEASE ORAL EVERY MORNING
Qty: 30 CAPSULE | Refills: 0 | Status: SHIPPED | OUTPATIENT
Start: 2024-05-30 | End: 2024-06-29

## 2024-04-30 RX ORDER — DEXTROAMPHETAMINE SACCHARATE, AMPHETAMINE ASPARTATE MONOHYDRATE, DEXTROAMPHETAMINE SULFATE AND AMPHETAMINE SULFATE 7.5; 7.5; 7.5; 7.5 MG/1; MG/1; MG/1; MG/1
30 CAPSULE, EXTENDED RELEASE ORAL EVERY MORNING
Qty: 30 CAPSULE | Refills: 0 | Status: SHIPPED | OUTPATIENT
Start: 2024-04-30 | End: 2024-05-01 | Stop reason: SDUPTHER

## 2024-04-30 RX ORDER — PANTOPRAZOLE SODIUM 40 MG/1
40 TABLET, DELAYED RELEASE ORAL DAILY
Qty: 30 TABLET | Refills: 5 | Status: SHIPPED | OUTPATIENT
Start: 2024-04-30 | End: 2024-06-11 | Stop reason: SDUPTHER

## 2024-04-30 RX ORDER — CODEINE PHOSPHATE AND GUAIFENESIN 10; 100 MG/5ML; MG/5ML
5 SOLUTION ORAL 3 TIMES DAILY PRN
Qty: 118 ML | Refills: 0 | Status: SHIPPED | OUTPATIENT
Start: 2024-04-30 | End: 2024-05-01 | Stop reason: SDUPTHER

## 2024-04-30 RX ORDER — DEXTROAMPHETAMINE SACCHARATE, AMPHETAMINE ASPARTATE, DEXTROAMPHETAMINE SULFATE AND AMPHETAMINE SULFATE 2.5; 2.5; 2.5; 2.5 MG/1; MG/1; MG/1; MG/1
10 TABLET ORAL DAILY
Qty: 30 TABLET | Refills: 0 | Status: SHIPPED | OUTPATIENT
Start: 2024-04-30 | End: 2024-06-11 | Stop reason: SDUPTHER

## 2024-04-30 RX ORDER — DEXTROAMPHETAMINE SACCHARATE, AMPHETAMINE ASPARTATE, DEXTROAMPHETAMINE SULFATE AND AMPHETAMINE SULFATE 2.5; 2.5; 2.5; 2.5 MG/1; MG/1; MG/1; MG/1
10 TABLET ORAL DAILY
Qty: 30 TABLET | Refills: 0 | Status: SHIPPED | OUTPATIENT
Start: 2024-06-30 | End: 2024-07-30

## 2024-04-30 RX ORDER — AZITHROMYCIN 250 MG/1
TABLET, FILM COATED ORAL
Qty: 6 TABLET | Refills: 0 | Status: SHIPPED | OUTPATIENT
Start: 2024-04-30 | End: 2024-05-05

## 2024-04-30 RX ORDER — DEXTROAMPHETAMINE SACCHARATE, AMPHETAMINE ASPARTATE, DEXTROAMPHETAMINE SULFATE AND AMPHETAMINE SULFATE 2.5; 2.5; 2.5; 2.5 MG/1; MG/1; MG/1; MG/1
10 TABLET ORAL DAILY
Qty: 30 TABLET | Refills: 0 | Status: SHIPPED | OUTPATIENT
Start: 2024-05-30 | End: 2024-06-29

## 2024-04-30 RX ORDER — DEXTROAMPHETAMINE SACCHARATE, AMPHETAMINE ASPARTATE MONOHYDRATE, DEXTROAMPHETAMINE SULFATE AND AMPHETAMINE SULFATE 7.5; 7.5; 7.5; 7.5 MG/1; MG/1; MG/1; MG/1
30 CAPSULE, EXTENDED RELEASE ORAL EVERY MORNING
Qty: 30 CAPSULE | Refills: 0 | Status: SHIPPED | OUTPATIENT
Start: 2024-06-30 | End: 2024-07-30

## 2024-04-30 NOTE — PROGRESS NOTES
SUBJECTIVE:    Patient ID: Owen Gonzalez is a 38 y.o. male.    Chief Complaint: Follow-up (No bottles// refills needed// pt states he's been having a dry cough going on for 3 weeks no headache no sore throat and no runny nose)    39 yo male who presents for regular checkup and refills. Treated for ADHD and doing really well overall. Staying busy with work. Starts early in the morning and takes meds. No trouble sleeping at night. Has had dry cough now for about 3 weeks. Started acutely. Fever/chills, bodyaches, cough. All symptoms better except for the cough. Has not taken anything recently otc. Theraflu and nyquel initially.    Follow-up  Associated symptoms include coughing. Pertinent negatives include no arthralgias, chest pain, headaches, joint swelling, neck pain, vomiting or weakness.       No visits with results within 6 Month(s) from this visit.   Latest known visit with results is:   Office Visit on 10/23/2023   Component Date Value Ref Range Status    WBC 10/23/2023 7.3  3.8 - 10.8 Thousand/uL Final    RBC 10/23/2023 5.07  4.20 - 5.80 Million/uL Final    Hemoglobin 10/23/2023 15.9  13.2 - 17.1 g/dL Final    Hematocrit 10/23/2023 48.5  38.5 - 50.0 % Final    MCV 10/23/2023 95.7  80.0 - 100.0 fL Final    MCH 10/23/2023 31.4  27.0 - 33.0 pg Final    MCHC 10/23/2023 32.8  32.0 - 36.0 g/dL Final    RDW 10/23/2023 11.6  11.0 - 15.0 % Final    Platelets 10/23/2023 224  140 - 400 Thousand/uL Final    MPV 10/23/2023 10.7  7.5 - 12.5 fL Final    Neutrophils, Abs 10/23/2023 4,898  1,500 - 7,800 cells/uL Final    Lymph # 10/23/2023 1,716  850 - 3,900 cells/uL Final    Mono # 10/23/2023 475  200 - 950 cells/uL Final    Eos # 10/23/2023 153  15 - 500 cells/uL Final    Baso # 10/23/2023 58  0 - 200 cells/uL Final    Neutrophils Relative 10/23/2023 67.1  % Final    Lymph % 10/23/2023 23.5  % Final    Mono % 10/23/2023 6.5  % Final    Eosinophil % 10/23/2023 2.1  % Final    Basophil % 10/23/2023 0.8  % Final     Glucose 10/23/2023 99  65 - 99 mg/dL Final    BUN 10/23/2023 9  7 - 25 mg/dL Final    Creatinine 10/23/2023 0.92  0.60 - 1.26 mg/dL Final    eGFR 10/23/2023 110  > OR = 60 mL/min/1.73m2 Final    BUN/Creatinine Ratio 10/23/2023 SEE NOTE:  6 - 22 (calc) Final    Sodium 10/23/2023 139  135 - 146 mmol/L Final    Potassium 10/23/2023 4.6  3.5 - 5.3 mmol/L Final    Chloride 10/23/2023 104  98 - 110 mmol/L Final    CO2 10/23/2023 28  20 - 32 mmol/L Final    Calcium 10/23/2023 9.7  8.6 - 10.3 mg/dL Final    Total Protein 10/23/2023 7.7  6.1 - 8.1 g/dL Final    Albumin 10/23/2023 4.5  3.6 - 5.1 g/dL Final    Globulin, Total 10/23/2023 3.2  1.9 - 3.7 g/dL (calc) Final    Albumin/Globulin Ratio 10/23/2023 1.4  1.0 - 2.5 (calc) Final    Total Bilirubin 10/23/2023 0.5  0.2 - 1.2 mg/dL Final    Alkaline Phosphatase 10/23/2023 62  36 - 130 U/L Final    AST 10/23/2023 15  10 - 40 U/L Final    ALT 10/23/2023 21  9 - 46 U/L Final    Cholesterol 10/23/2023 199  <200 mg/dL Final    HDL 10/23/2023 68  > OR = 40 mg/dL Final    Triglycerides 10/23/2023 62  <150 mg/dL Final    LDL Cholesterol 10/23/2023 116 (H)  mg/dL (calc) Final    HDL/Cholesterol Ratio 10/23/2023 2.9  <5.0 (calc) Final    Non HDL Chol. (LDL+VLDL) 10/23/2023 131 (H)  <130 mg/dL (calc) Final    TSH 10/23/2023 1.49  0.40 - 4.50 mIU/L Final    Color, UA 10/23/2023 DARK YELLOW  YELLOW Final    Appearance, UA 10/23/2023 CLEAR  CLEAR Final    Specific Gravity, UA 10/23/2023 1.023  1.001 - 1.035 Final    pH, UA 10/23/2023 5.5  5.0 - 8.0 Final    Glucose, UA 10/23/2023 NEGATIVE  NEGATIVE Final    Bilirubin, UA 10/23/2023 NEGATIVE  NEGATIVE Final    Ketones, UA 10/23/2023 NEGATIVE  NEGATIVE Final    Occult Blood UA 10/23/2023 NEGATIVE  NEGATIVE Final    Protein, UA 10/23/2023 NEGATIVE  NEGATIVE Final    Nitrite, UA 10/23/2023 NEGATIVE  NEGATIVE Final    Leukocytes, UA 10/23/2023 NEGATIVE  NEGATIVE Final    WBC Casts, UA 10/23/2023 NONE SEEN  < OR = 5 /HPF Final    RBC Casts, UA  10/23/2023 NONE SEEN  < OR = 2 /HPF Final    Squam Epithel, UA 10/23/2023 NONE SEEN  < OR = 5 /HPF Final    Bacteria, UA 10/23/2023 NONE SEEN  NONE SEEN /HPF Final    Hyaline Casts, UA 10/23/2023 NONE SEEN  NONE SEEN /LPF Final    Service Cmt: 10/23/2023    Final    Reflexive Urine Culture 10/23/2023    Final       Past Medical History:   Diagnosis Date    ADHD      Past Surgical History:   Procedure Laterality Date    ANKLE FRACTURE SURGERY Left 2008    left ankle surgery 2008      OPEN REDUCTION AND INTERNAL FIXATION (ORIF) OF FRACTURE OF CLAVICLE Right 8/18/2020    Procedure: ORIF, FRACTURE, CLAVICLE;  Surgeon: Murtaza Adkins Jr., MD;  Location: [facility];  Service: Orthopedics;  Laterality: Right;  Accumed     Family History   Problem Relation Name Age of Onset    Diabetes Maternal Grandfather         Marital Status:   Alcohol History:  reports current alcohol use.  Tobacco History:  reports that he has quit smoking. He has been exposed to tobacco smoke. He quit smokeless tobacco use about 8 years ago.  Drug History:  reports no history of drug use.    Review of patient's allergies indicates:  No Known Allergies    Current Outpatient Medications:     azithromycin (Z-WAI) 250 MG tablet, Take 2 tablets by mouth on day 1; Take 1 tablet by mouth on days 2-5, Disp: 6 tablet, Rfl: 0    dextroamphetamine-amphetamine (ADDERALL XR) 30 MG 24 hr capsule, Take 1 capsule (30 mg total) by mouth every morning., Disp: 30 capsule, Rfl: 0    [START ON 5/30/2024] dextroamphetamine-amphetamine (ADDERALL XR) 30 MG 24 hr capsule, Take 1 capsule (30 mg total) by mouth every morning., Disp: 30 capsule, Rfl: 0    [START ON 6/30/2024] dextroamphetamine-amphetamine (ADDERALL XR) 30 MG 24 hr capsule, Take 1 capsule (30 mg total) by mouth every morning., Disp: 30 capsule, Rfl: 0    dextroamphetamine-amphetamine (ADDERALL) 10 mg Tab, Take 1 tablet (10 mg total) by mouth once daily., Disp: 30 tablet, Rfl: 0    [START ON 5/30/2024]  "dextroamphetamine-amphetamine (ADDERALL) 10 mg Tab, Take 1 tablet (10 mg total) by mouth once daily., Disp: 30 tablet, Rfl: 0    [START ON 6/30/2024] dextroamphetamine-amphetamine (ADDERALL) 10 mg Tab, Take 1 tablet (10 mg total) by mouth once daily., Disp: 30 tablet, Rfl: 0    guaiFENesin-codeine 100-10 mg/5 ml (TUSSI-ORGANIDIN NR)  mg/5 mL syrup, Take 5 mLs by mouth 3 (three) times daily as needed for Cough., Disp: 118 mL, Rfl: 0    pantoprazole (PROTONIX) 40 MG tablet, Take 1 tablet (40 mg total) by mouth once daily., Disp: 30 tablet, Rfl: 5    Review of Systems   Constitutional:  Negative for activity change and unexpected weight change.   HENT:  Negative for hearing loss, rhinorrhea and trouble swallowing.    Eyes:  Negative for discharge and visual disturbance.   Respiratory:  Positive for cough. Negative for chest tightness and wheezing.    Cardiovascular:  Negative for chest pain and palpitations.   Gastrointestinal:  Negative for blood in stool, constipation, diarrhea and vomiting.   Endocrine: Negative for polydipsia and polyuria.   Genitourinary:  Negative for difficulty urinating, hematuria and urgency.   Musculoskeletal:  Negative for arthralgias, joint swelling and neck pain.   Neurological:  Negative for weakness and headaches.   Psychiatric/Behavioral:  Negative for confusion and dysphoric mood.           Objective:      Vitals:    04/30/24 1516 04/30/24 1550   BP: (!) 139/90 138/88   Pulse: 109 98   Resp: 18    SpO2: 96%    Weight: 102.5 kg (226 lb)    Height: 5' 9" (1.753 m)      Physical Exam  Constitutional:       General: He is not in acute distress.     Appearance: He is well-developed.   HENT:      Head: Normocephalic and atraumatic.   Eyes:      Pupils: Pupils are equal, round, and reactive to light.   Neck:      Thyroid: No thyromegaly.   Cardiovascular:      Rate and Rhythm: Normal rate and regular rhythm.      Heart sounds: Normal heart sounds.   Pulmonary:      Effort: Pulmonary " effort is normal.      Breath sounds: Normal breath sounds.   Abdominal:      General: Bowel sounds are normal. There is no distension.      Palpations: Abdomen is soft.      Tenderness: There is no abdominal tenderness.   Musculoskeletal:         General: Normal range of motion.      Cervical back: Normal range of motion and neck supple.   Skin:     General: Skin is warm and dry.      Findings: No erythema or rash.   Neurological:      Mental Status: He is alert and oriented to person, place, and time.      Cranial Nerves: No cranial nerve deficit.           Assessment:       1. Bronchitis    2. Attention deficit hyperactivity disorder (ADHD), unspecified ADHD type    3. Gastroesophageal reflux disease, unspecified whether esophagitis present         Plan:       Bronchitis  Comments:  treat with azithro/cheratussin. If sxs worsen or persist will let me know.  Orders:  -     azithromycin (Z-WAI) 250 MG tablet; Take 2 tablets by mouth on day 1; Take 1 tablet by mouth on days 2-5  Dispense: 6 tablet; Refill: 0  -     guaiFENesin-codeine 100-10 mg/5 ml (TUSSI-ORGANIDIN NR)  mg/5 mL syrup; Take 5 mLs by mouth 3 (three) times daily as needed for Cough.  Dispense: 118 mL; Refill: 0    Attention deficit hyperactivity disorder (ADHD), unspecified ADHD type  Comments:  Doing MUCH better on current dosage regemin. will refill as needed.  Orders:  -     dextroamphetamine-amphetamine (ADDERALL XR) 30 MG 24 hr capsule; Take 1 capsule (30 mg total) by mouth every morning.  Dispense: 30 capsule; Refill: 0  -     dextroamphetamine-amphetamine (ADDERALL) 10 mg Tab; Take 1 tablet (10 mg total) by mouth once daily.  Dispense: 30 tablet; Refill: 0  -     dextroamphetamine-amphetamine (ADDERALL) 10 mg Tab; Take 1 tablet (10 mg total) by mouth once daily.  Dispense: 30 tablet; Refill: 0  -     dextroamphetamine-amphetamine (ADDERALL) 10 mg Tab; Take 1 tablet (10 mg total) by mouth once daily.  Dispense: 30 tablet; Refill: 0  -      dextroamphetamine-amphetamine (ADDERALL XR) 30 MG 24 hr capsule; Take 1 capsule (30 mg total) by mouth every morning.  Dispense: 30 capsule; Refill: 0  -     dextroamphetamine-amphetamine (ADDERALL XR) 30 MG 24 hr capsule; Take 1 capsule (30 mg total) by mouth every morning.  Dispense: 30 capsule; Refill: 0    Gastroesophageal reflux disease, unspecified whether esophagitis present  Comments:  add PPI today. discussed with pt proper dosing. f/u in 2-3 mos to discuss efficacy.  Orders:  -     pantoprazole (PROTONIX) 40 MG tablet; Take 1 tablet (40 mg total) by mouth once daily.  Dispense: 30 tablet; Refill: 5      Follow up in about 6 months (around 10/30/2024).        4/30/2024 Johnathan Gill PA-C

## 2024-05-01 DIAGNOSIS — J40 BRONCHITIS: ICD-10-CM

## 2024-05-01 DIAGNOSIS — F90.9 ATTENTION DEFICIT HYPERACTIVITY DISORDER (ADHD), UNSPECIFIED ADHD TYPE: ICD-10-CM

## 2024-05-01 RX ORDER — CODEINE PHOSPHATE AND GUAIFENESIN 10; 100 MG/5ML; MG/5ML
5 SOLUTION ORAL 3 TIMES DAILY PRN
Qty: 118 ML | Refills: 0 | Status: SHIPPED | OUTPATIENT
Start: 2024-05-01 | End: 2024-05-11

## 2024-05-01 RX ORDER — DEXTROAMPHETAMINE SACCHARATE, AMPHETAMINE ASPARTATE MONOHYDRATE, DEXTROAMPHETAMINE SULFATE AND AMPHETAMINE SULFATE 7.5; 7.5; 7.5; 7.5 MG/1; MG/1; MG/1; MG/1
30 CAPSULE, EXTENDED RELEASE ORAL EVERY MORNING
Qty: 30 CAPSULE | Refills: 0 | Status: SHIPPED | OUTPATIENT
Start: 2024-05-01 | End: 2024-06-11 | Stop reason: SDUPTHER

## 2024-06-11 DIAGNOSIS — F90.9 ATTENTION DEFICIT HYPERACTIVITY DISORDER (ADHD), UNSPECIFIED ADHD TYPE: ICD-10-CM

## 2024-06-11 DIAGNOSIS — K21.9 GASTROESOPHAGEAL REFLUX DISEASE, UNSPECIFIED WHETHER ESOPHAGITIS PRESENT: ICD-10-CM

## 2024-06-11 RX ORDER — DEXTROAMPHETAMINE SACCHARATE, AMPHETAMINE ASPARTATE, DEXTROAMPHETAMINE SULFATE AND AMPHETAMINE SULFATE 2.5; 2.5; 2.5; 2.5 MG/1; MG/1; MG/1; MG/1
10 TABLET ORAL DAILY
Qty: 30 TABLET | Refills: 0 | Status: SHIPPED | OUTPATIENT
Start: 2024-06-11 | End: 2024-07-11

## 2024-06-11 RX ORDER — PANTOPRAZOLE SODIUM 40 MG/1
40 TABLET, DELAYED RELEASE ORAL DAILY
Qty: 30 TABLET | Refills: 5 | Status: SHIPPED | OUTPATIENT
Start: 2024-06-11 | End: 2024-12-08

## 2024-06-11 RX ORDER — DEXTROAMPHETAMINE SACCHARATE, AMPHETAMINE ASPARTATE MONOHYDRATE, DEXTROAMPHETAMINE SULFATE AND AMPHETAMINE SULFATE 7.5; 7.5; 7.5; 7.5 MG/1; MG/1; MG/1; MG/1
30 CAPSULE, EXTENDED RELEASE ORAL EVERY MORNING
Qty: 30 CAPSULE | Refills: 0 | Status: SHIPPED | OUTPATIENT
Start: 2024-06-11 | End: 2024-07-11

## 2024-07-25 DIAGNOSIS — F90.9 ATTENTION DEFICIT HYPERACTIVITY DISORDER (ADHD), UNSPECIFIED ADHD TYPE: ICD-10-CM

## 2024-07-25 DIAGNOSIS — K21.9 GASTROESOPHAGEAL REFLUX DISEASE, UNSPECIFIED WHETHER ESOPHAGITIS PRESENT: ICD-10-CM

## 2024-07-25 RX ORDER — DEXTROAMPHETAMINE SACCHARATE, AMPHETAMINE ASPARTATE MONOHYDRATE, DEXTROAMPHETAMINE SULFATE AND AMPHETAMINE SULFATE 7.5; 7.5; 7.5; 7.5 MG/1; MG/1; MG/1; MG/1
30 CAPSULE, EXTENDED RELEASE ORAL EVERY MORNING
Qty: 30 CAPSULE | Refills: 0 | Status: SHIPPED | OUTPATIENT
Start: 2024-07-25 | End: 2024-08-24

## 2024-07-25 RX ORDER — DEXTROAMPHETAMINE SACCHARATE, AMPHETAMINE ASPARTATE, DEXTROAMPHETAMINE SULFATE AND AMPHETAMINE SULFATE 2.5; 2.5; 2.5; 2.5 MG/1; MG/1; MG/1; MG/1
10 TABLET ORAL DAILY
Qty: 30 TABLET | Refills: 0 | Status: SHIPPED | OUTPATIENT
Start: 2024-07-25 | End: 2024-08-24

## 2024-07-25 RX ORDER — PANTOPRAZOLE SODIUM 40 MG/1
40 TABLET, DELAYED RELEASE ORAL DAILY
Qty: 30 TABLET | Refills: 5 | Status: SHIPPED | OUTPATIENT
Start: 2024-07-25 | End: 2025-01-21

## 2024-09-13 DIAGNOSIS — K21.9 GASTROESOPHAGEAL REFLUX DISEASE, UNSPECIFIED WHETHER ESOPHAGITIS PRESENT: ICD-10-CM

## 2024-09-13 DIAGNOSIS — F90.9 ATTENTION DEFICIT HYPERACTIVITY DISORDER (ADHD), UNSPECIFIED ADHD TYPE: ICD-10-CM

## 2024-09-13 RX ORDER — DEXTROAMPHETAMINE SACCHARATE, AMPHETAMINE ASPARTATE MONOHYDRATE, DEXTROAMPHETAMINE SULFATE AND AMPHETAMINE SULFATE 7.5; 7.5; 7.5; 7.5 MG/1; MG/1; MG/1; MG/1
30 CAPSULE, EXTENDED RELEASE ORAL EVERY MORNING
Qty: 30 CAPSULE | Refills: 0 | Status: SHIPPED | OUTPATIENT
Start: 2024-09-13 | End: 2024-10-13

## 2024-09-13 RX ORDER — PANTOPRAZOLE SODIUM 40 MG/1
40 TABLET, DELAYED RELEASE ORAL DAILY
Qty: 30 TABLET | Refills: 5 | Status: SHIPPED | OUTPATIENT
Start: 2024-09-13 | End: 2025-03-12

## 2024-10-09 ENCOUNTER — TELEPHONE (OUTPATIENT)
Dept: FAMILY MEDICINE | Facility: CLINIC | Age: 39
End: 2024-10-09
Payer: COMMERCIAL

## 2024-10-09 DIAGNOSIS — Z00.00 WELL ADULT EXAM: Primary | ICD-10-CM

## 2024-10-25 ENCOUNTER — PATIENT MESSAGE (OUTPATIENT)
Dept: RESEARCH | Facility: HOSPITAL | Age: 39
End: 2024-10-25
Payer: COMMERCIAL

## 2024-11-13 DIAGNOSIS — F90.9 ATTENTION DEFICIT HYPERACTIVITY DISORDER (ADHD), UNSPECIFIED ADHD TYPE: ICD-10-CM

## 2024-11-13 DIAGNOSIS — K21.9 GASTROESOPHAGEAL REFLUX DISEASE, UNSPECIFIED WHETHER ESOPHAGITIS PRESENT: ICD-10-CM

## 2024-11-13 RX ORDER — DEXTROAMPHETAMINE SACCHARATE, AMPHETAMINE ASPARTATE MONOHYDRATE, DEXTROAMPHETAMINE SULFATE AND AMPHETAMINE SULFATE 7.5; 7.5; 7.5; 7.5 MG/1; MG/1; MG/1; MG/1
30 CAPSULE, EXTENDED RELEASE ORAL EVERY MORNING
Qty: 30 CAPSULE | Refills: 0 | Status: SHIPPED | OUTPATIENT
Start: 2024-11-13 | End: 2024-12-13

## 2024-11-13 RX ORDER — PANTOPRAZOLE SODIUM 40 MG/1
40 TABLET, DELAYED RELEASE ORAL DAILY
Qty: 30 TABLET | Refills: 5 | Status: SHIPPED | OUTPATIENT
Start: 2024-11-13 | End: 2025-05-12

## 2024-12-09 ENCOUNTER — PATIENT MESSAGE (OUTPATIENT)
Dept: FAMILY MEDICINE | Facility: CLINIC | Age: 39
End: 2024-12-09
Payer: COMMERCIAL

## 2024-12-09 DIAGNOSIS — I10 HYPERTENSION, UNSPECIFIED TYPE: Primary | ICD-10-CM

## 2024-12-09 RX ORDER — HYDROCHLOROTHIAZIDE 25 MG/1
25 TABLET ORAL DAILY
Qty: 30 TABLET | Refills: 2 | Status: SHIPPED | OUTPATIENT
Start: 2024-12-09 | End: 2025-12-09

## 2024-12-09 NOTE — TELEPHONE ENCOUNTER
Lets add HCTZ 25mg daily and see if this helps with getting his pressure to goal. Send me a log in a couple weeks for update

## 2024-12-27 DIAGNOSIS — F90.9 ATTENTION DEFICIT HYPERACTIVITY DISORDER (ADHD), UNSPECIFIED ADHD TYPE: ICD-10-CM

## 2024-12-27 RX ORDER — DEXTROAMPHETAMINE SACCHARATE, AMPHETAMINE ASPARTATE MONOHYDRATE, DEXTROAMPHETAMINE SULFATE AND AMPHETAMINE SULFATE 7.5; 7.5; 7.5; 7.5 MG/1; MG/1; MG/1; MG/1
30 CAPSULE, EXTENDED RELEASE ORAL EVERY MORNING
Qty: 30 CAPSULE | Refills: 0 | Status: CANCELLED | OUTPATIENT
Start: 2024-12-27 | End: 2025-01-26

## 2024-12-30 NOTE — TELEPHONE ENCOUNTER
Spoke with pt and informed him that he has an appt tomorrow. Pt states he is ok to wait until tomorrow for his adderall refill.

## 2024-12-31 ENCOUNTER — OFFICE VISIT (OUTPATIENT)
Dept: FAMILY MEDICINE | Facility: CLINIC | Age: 39
End: 2024-12-31
Payer: COMMERCIAL

## 2024-12-31 VITALS
SYSTOLIC BLOOD PRESSURE: 132 MMHG | BODY MASS INDEX: 33.74 KG/M2 | HEART RATE: 75 BPM | WEIGHT: 227.81 LBS | DIASTOLIC BLOOD PRESSURE: 90 MMHG | OXYGEN SATURATION: 97 % | HEIGHT: 69 IN

## 2024-12-31 DIAGNOSIS — F90.9 ATTENTION DEFICIT HYPERACTIVITY DISORDER (ADHD), UNSPECIFIED ADHD TYPE: ICD-10-CM

## 2024-12-31 DIAGNOSIS — I10 HYPERTENSION, UNSPECIFIED TYPE: ICD-10-CM

## 2024-12-31 DIAGNOSIS — F90.9 ATTENTION DEFICIT HYPERACTIVITY DISORDER (ADHD), UNSPECIFIED ADHD TYPE: Primary | ICD-10-CM

## 2024-12-31 DIAGNOSIS — Z79.899 ENCOUNTER FOR LONG-TERM (CURRENT) USE OF MEDICATIONS: ICD-10-CM

## 2024-12-31 PROCEDURE — 1159F MED LIST DOCD IN RCRD: CPT | Mod: CPTII,S$GLB,, | Performed by: PHYSICIAN ASSISTANT

## 2024-12-31 PROCEDURE — 3075F SYST BP GE 130 - 139MM HG: CPT | Mod: CPTII,S$GLB,, | Performed by: PHYSICIAN ASSISTANT

## 2024-12-31 PROCEDURE — 3080F DIAST BP >= 90 MM HG: CPT | Mod: CPTII,S$GLB,, | Performed by: PHYSICIAN ASSISTANT

## 2024-12-31 PROCEDURE — 3008F BODY MASS INDEX DOCD: CPT | Mod: CPTII,S$GLB,, | Performed by: PHYSICIAN ASSISTANT

## 2024-12-31 PROCEDURE — 99214 OFFICE O/P EST MOD 30 MIN: CPT | Mod: S$GLB,,, | Performed by: PHYSICIAN ASSISTANT

## 2024-12-31 RX ORDER — DEXTROAMPHETAMINE SACCHARATE, AMPHETAMINE ASPARTATE, DEXTROAMPHETAMINE SULFATE AND AMPHETAMINE SULFATE 2.5; 2.5; 2.5; 2.5 MG/1; MG/1; MG/1; MG/1
10 TABLET ORAL DAILY
Qty: 30 TABLET | Refills: 0 | Status: SHIPPED | OUTPATIENT
Start: 2025-02-28 | End: 2025-03-30

## 2024-12-31 RX ORDER — DEXTROAMPHETAMINE SACCHARATE, AMPHETAMINE ASPARTATE MONOHYDRATE, DEXTROAMPHETAMINE SULFATE AND AMPHETAMINE SULFATE 7.5; 7.5; 7.5; 7.5 MG/1; MG/1; MG/1; MG/1
30 CAPSULE, EXTENDED RELEASE ORAL EVERY MORNING
Qty: 30 CAPSULE | Refills: 0 | Status: SHIPPED | OUTPATIENT
Start: 2024-12-31 | End: 2025-01-30

## 2024-12-31 RX ORDER — DEXTROAMPHETAMINE SACCHARATE, AMPHETAMINE ASPARTATE, DEXTROAMPHETAMINE SULFATE AND AMPHETAMINE SULFATE 2.5; 2.5; 2.5; 2.5 MG/1; MG/1; MG/1; MG/1
10 TABLET ORAL DAILY
Qty: 30 TABLET | Refills: 0 | Status: SHIPPED | OUTPATIENT
Start: 2025-01-31 | End: 2025-03-02

## 2024-12-31 RX ORDER — OLMESARTAN MEDOXOMIL AND HYDROCHLOROTHIAZIDE 40/25 40; 25 MG/1; MG/1
1 TABLET ORAL DAILY
Qty: 90 TABLET | Refills: 3 | Status: SHIPPED | OUTPATIENT
Start: 2024-12-31 | End: 2025-12-31

## 2024-12-31 RX ORDER — DEXTROAMPHETAMINE SACCHARATE, AMPHETAMINE ASPARTATE, DEXTROAMPHETAMINE SULFATE AND AMPHETAMINE SULFATE 2.5; 2.5; 2.5; 2.5 MG/1; MG/1; MG/1; MG/1
10 TABLET ORAL DAILY
Qty: 30 TABLET | Refills: 0 | Status: SHIPPED | OUTPATIENT
Start: 2024-12-31 | End: 2025-01-30

## 2024-12-31 RX ORDER — DEXTROAMPHETAMINE SACCHARATE, AMPHETAMINE ASPARTATE MONOHYDRATE, DEXTROAMPHETAMINE SULFATE AND AMPHETAMINE SULFATE 7.5; 7.5; 7.5; 7.5 MG/1; MG/1; MG/1; MG/1
30 CAPSULE, EXTENDED RELEASE ORAL EVERY MORNING
Qty: 30 CAPSULE | Refills: 0 | Status: SHIPPED | OUTPATIENT
Start: 2025-01-31 | End: 2025-03-02

## 2024-12-31 RX ORDER — DEXTROAMPHETAMINE SACCHARATE, AMPHETAMINE ASPARTATE MONOHYDRATE, DEXTROAMPHETAMINE SULFATE AND AMPHETAMINE SULFATE 7.5; 7.5; 7.5; 7.5 MG/1; MG/1; MG/1; MG/1
30 CAPSULE, EXTENDED RELEASE ORAL EVERY MORNING
Qty: 30 CAPSULE | Refills: 0 | Status: SHIPPED | OUTPATIENT
Start: 2025-02-28 | End: 2025-03-30

## 2024-12-31 NOTE — PROGRESS NOTES
SUBJECTIVE:    Patient ID: Owen Gonzalez is a 39 y.o. male.    Chief Complaint: Follow-up (No bottles//Pt is here for blood pressure issues, states it has still been running high despite medication- high of 154/95- low of 130/91//KE)    History of Present Illness    CHIEF COMPLAINT:  Owen presents today for follow-up regarding blood pressure management.    HYPERTENSION:  He reports elevated home blood pressure readings of 132/90 despite previous addition of hydrochlorothiazide.    UPPER RESPIRATORY SYMPTOMS:  He reports cough for the past few days, worse in the morning with improvement throughout the day. He notes associated post-nasal drip. He denies fever, chills, body aches, or ear pain despite presence of fluid in ears.    CURRENT MEDICATIONS:  He takes Adderall 30 mg in the morning and 10 mg in the afternoon.      ROS:  General: -fever, -chills, -fatigue, -weight gain, -weight loss  Eyes: -vision changes, -redness, -discharge  ENT: -ear pain, -nasal congestion, -sore throat, +post nasal drip  Cardiovascular: -chest pain, -palpitations, -lower extremity edema  Respiratory: +cough, -shortness of breath  Gastrointestinal: -abdominal pain, -nausea, -vomiting, -diarrhea, -constipation, -blood in stool  Genitourinary: -dysuria, -hematuria, -frequency  Musculoskeletal: -joint pain, -muscle pain  Skin: -rash, -lesion  Neurological: -headache, -dizziness, -numbness, -tingling  Psychiatric: -anxiety, -depression, -sleep difficulty         No visits with results within 6 Month(s) from this visit.   Latest known visit with results is:   Office Visit on 10/23/2023   Component Date Value Ref Range Status    WBC 10/23/2023 7.3  3.8 - 10.8 Thousand/uL Final    RBC 10/23/2023 5.07  4.20 - 5.80 Million/uL Final    Hemoglobin 10/23/2023 15.9  13.2 - 17.1 g/dL Final    Hematocrit 10/23/2023 48.5  38.5 - 50.0 % Final    MCV 10/23/2023 95.7  80.0 - 100.0 fL Final    MCH 10/23/2023 31.4  27.0 - 33.0 pg Final    MCHC  10/23/2023 32.8  32.0 - 36.0 g/dL Final    RDW 10/23/2023 11.6  11.0 - 15.0 % Final    Platelets 10/23/2023 224  140 - 400 Thousand/uL Final    MPV 10/23/2023 10.7  7.5 - 12.5 fL Final    Neutrophils, Abs 10/23/2023 4,898  1,500 - 7,800 cells/uL Final    Lymph # 10/23/2023 1,716  850 - 3,900 cells/uL Final    Mono # 10/23/2023 475  200 - 950 cells/uL Final    Eos # 10/23/2023 153  15 - 500 cells/uL Final    Baso # 10/23/2023 58  0 - 200 cells/uL Final    Neutrophils Relative 10/23/2023 67.1  % Final    Lymph % 10/23/2023 23.5  % Final    Mono % 10/23/2023 6.5  % Final    Eosinophil % 10/23/2023 2.1  % Final    Basophil % 10/23/2023 0.8  % Final    Glucose 10/23/2023 99  65 - 99 mg/dL Final    BUN 10/23/2023 9  7 - 25 mg/dL Final    Creatinine 10/23/2023 0.92  0.60 - 1.26 mg/dL Final    eGFR 10/23/2023 110  > OR = 60 mL/min/1.73m2 Final    BUN/Creatinine Ratio 10/23/2023 SEE NOTE:  6 - 22 (calc) Final    Sodium 10/23/2023 139  135 - 146 mmol/L Final    Potassium 10/23/2023 4.6  3.5 - 5.3 mmol/L Final    Chloride 10/23/2023 104  98 - 110 mmol/L Final    CO2 10/23/2023 28  20 - 32 mmol/L Final    Calcium 10/23/2023 9.7  8.6 - 10.3 mg/dL Final    Total Protein 10/23/2023 7.7  6.1 - 8.1 g/dL Final    Albumin 10/23/2023 4.5  3.6 - 5.1 g/dL Final    Globulin, Total 10/23/2023 3.2  1.9 - 3.7 g/dL (calc) Final    Albumin/Globulin Ratio 10/23/2023 1.4  1.0 - 2.5 (calc) Final    Total Bilirubin 10/23/2023 0.5  0.2 - 1.2 mg/dL Final    Alkaline Phosphatase 10/23/2023 62  36 - 130 U/L Final    AST 10/23/2023 15  10 - 40 U/L Final    ALT 10/23/2023 21  9 - 46 U/L Final    Cholesterol 10/23/2023 199  <200 mg/dL Final    HDL 10/23/2023 68  > OR = 40 mg/dL Final    Triglycerides 10/23/2023 62  <150 mg/dL Final    LDL Cholesterol 10/23/2023 116 (H)  mg/dL (calc) Final    HDL/Cholesterol Ratio 10/23/2023 2.9  <5.0 (calc) Final    Non HDL Chol. (LDL+VLDL) 10/23/2023 131 (H)  <130 mg/dL (calc) Final    TSH 10/23/2023 1.49  0.40 - 4.50  mIU/L Final    Color, UA 10/23/2023 DARK YELLOW  YELLOW Final    Appearance, UA 10/23/2023 CLEAR  CLEAR Final    Specific Gravity, UA 10/23/2023 1.023  1.001 - 1.035 Final    pH, UA 10/23/2023 5.5  5.0 - 8.0 Final    Glucose, UA 10/23/2023 NEGATIVE  NEGATIVE Final    Bilirubin, UA 10/23/2023 NEGATIVE  NEGATIVE Final    Ketones, UA 10/23/2023 NEGATIVE  NEGATIVE Final    Occult Blood UA 10/23/2023 NEGATIVE  NEGATIVE Final    Protein, UA 10/23/2023 NEGATIVE  NEGATIVE Final    Nitrite, UA 10/23/2023 NEGATIVE  NEGATIVE Final    Leukocytes, UA 10/23/2023 NEGATIVE  NEGATIVE Final    WBC Casts, UA 10/23/2023 NONE SEEN  < OR = 5 /HPF Final    RBC Casts, UA 10/23/2023 NONE SEEN  < OR = 2 /HPF Final    Squam Epithel, UA 10/23/2023 NONE SEEN  < OR = 5 /HPF Final    Bacteria, UA 10/23/2023 NONE SEEN  NONE SEEN /HPF Final    Hyaline Casts, UA 10/23/2023 NONE SEEN  NONE SEEN /LPF Final    Service Cmt: 10/23/2023    Final    Reflexive Urine Culture 10/23/2023    Final       Past Medical History:   Diagnosis Date    ADHD      Past Surgical History:   Procedure Laterality Date    ANKLE FRACTURE SURGERY Left 2008    left ankle surgery 2008      OPEN REDUCTION AND INTERNAL FIXATION (ORIF) OF FRACTURE OF CLAVICLE Right 8/18/2020    Procedure: ORIF, FRACTURE, CLAVICLE;  Surgeon: Murtaza Adkins Jr., MD;  Location: [facility];  Service: Orthopedics;  Laterality: Right;  Accumed     Family History   Problem Relation Name Age of Onset    Diabetes Maternal Grandfather         Marital Status:   Alcohol History:  reports current alcohol use.  Tobacco History:  reports that he has quit smoking. He has been exposed to tobacco smoke. He quit smokeless tobacco use about 9 years ago.  Drug History:  reports no history of drug use.    Review of patient's allergies indicates:  No Known Allergies    Current Outpatient Medications:     dextroamphetamine-amphetamine (ADDERALL XR) 30 MG 24 hr capsule, Take 1 capsule (30 mg total) by mouth every  "morning., Disp: 30 capsule, Rfl: 0    [START ON 1/31/2025] dextroamphetamine-amphetamine (ADDERALL XR) 30 MG 24 hr capsule, Take 1 capsule (30 mg total) by mouth every morning., Disp: 30 capsule, Rfl: 0    [START ON 2/28/2025] dextroamphetamine-amphetamine (ADDERALL XR) 30 MG 24 hr capsule, Take 1 capsule (30 mg total) by mouth every morning., Disp: 30 capsule, Rfl: 0    dextroamphetamine-amphetamine (ADDERALL) 10 mg Tab, Take 1 tablet (10 mg total) by mouth once daily., Disp: 30 tablet, Rfl: 0    [START ON 1/31/2025] dextroamphetamine-amphetamine (ADDERALL) 10 mg Tab, Take 1 tablet (10 mg total) by mouth once daily., Disp: 30 tablet, Rfl: 0    [START ON 2/28/2025] dextroamphetamine-amphetamine (ADDERALL) 10 mg Tab, Take 1 tablet (10 mg total) by mouth once daily., Disp: 30 tablet, Rfl: 0    hydroCHLOROthiazide (HYDRODIURIL) 25 MG tablet, Take 1 tablet (25 mg total) by mouth once daily., Disp: 30 tablet, Rfl: 2    olmesartan-hydrochlorothiazide (BENICAR HCT) 40-25 mg per tablet, Take 1 tablet by mouth once daily., Disp: 90 tablet, Rfl: 3    pantoprazole (PROTONIX) 40 MG tablet, Take 1 tablet (40 mg total) by mouth once daily., Disp: 30 tablet, Rfl: 5    Objective:      Vitals:    12/31/24 0837   BP: (!) 132/90   Pulse: 75   SpO2: 97%   Weight: 103.3 kg (227 lb 12.8 oz)   Height: 5' 9" (1.753 m)     Physical Exam    Vitals: Blood pressure: 132/90.  General: No acute distress. Well-developed. Well-nourished.  Eyes: EOMI. Sclerae anicteric.  HENT: Normocephalic. Atraumatic. Nares patent. Moist oral mucosa. Mild erythema in throat.  Ears: Fluid in ears. Bilateral EACs clear. No pain in ears.  Cardiovascular: Regular rate. Regular rhythm. No murmurs. No rubs. No gallops. Normal S1, S2.  Respiratory: Normal respiratory effort. Clear to auscultation bilaterally. No rales. No rhonchi. No wheezing.  Abdomen: Soft. Non-tender. Non-distended. Normoactive bowel sounds.  Musculoskeletal: No  obvious deformity.  Extremities: No " lower extremity edema.  Neurological: Alert & oriented x3. No slurred speech. Normal gait.  Psychiatric: Normal mood. Normal affect. Good insight. Good judgment.  Skin: Warm. Dry. No rash.         Assessment:       Assessment & Plan    - Assessed patient's blood pressure control, noting inadequate improvement with hydrochlorothiazide alone  - Will switch to combination therapy with olmesartan and HCTZ for improved BP management  - Evaluated patient's cough, noting signs of upper respiratory viral infection with post-nasal drip  - Considered antibiotics if symptoms persist beyond 2 weeks    HYPERTENSION:  - Explained the new blood pressure medication combines 2 components in 1 pill.  - Discussed the upper limit of normal for diastolic blood pressure.  - Owen to monitor blood pressure readings for 1 week after starting new medication.  - Started olmesartan/HCTZ combination pill, 1 tablet daily.  - Discontinued hydrochlorothiazide.  - Ordered fasting labs to check kidneys, liver, and thyroid function.  - Message through [facility] after about 1 week with blood pressure readings on new medication.    ATTENTION-DEFICIT HYPERACTIVITY DISORDER:  - Continued Adderall XR 30 mg daily in the morning.  - Refilled Adderall IR 10 mg as needed, not necessarily daily.    COMMON COLD:  - Contact the office if cough lingers or does not improve.    FOLLOW-UP:  - Follow up in 6 months for regular checkup, or sooner if needed.       Plan:       Attention deficit hyperactivity disorder (ADHD), unspecified ADHD type  -     DRUG MONITOR, PANEL 4, W/CONF, URINE  -     dextroamphetamine-amphetamine (ADDERALL XR) 30 MG 24 hr capsule; Take 1 capsule (30 mg total) by mouth every morning.  Dispense: 30 capsule; Refill: 0  -     dextroamphetamine-amphetamine (ADDERALL) 10 mg Tab; Take 1 tablet (10 mg total) by mouth once daily.  Dispense: 30 tablet; Refill: 0  -     dextroamphetamine-amphetamine (ADDERALL XR) 30 MG 24 hr capsule; Take 1 capsule (30  mg total) by mouth every morning.  Dispense: 30 capsule; Refill: 0  -     dextroamphetamine-amphetamine (ADDERALL XR) 30 MG 24 hr capsule; Take 1 capsule (30 mg total) by mouth every morning.  Dispense: 30 capsule; Refill: 0  -     dextroamphetamine-amphetamine (ADDERALL) 10 mg Tab; Take 1 tablet (10 mg total) by mouth once daily.  Dispense: 30 tablet; Refill: 0  -     dextroamphetamine-amphetamine (ADDERALL) 10 mg Tab; Take 1 tablet (10 mg total) by mouth once daily.  Dispense: 30 tablet; Refill: 0    Encounter for long-term (current) use of medications  -     DRUG MONITOR, PANEL 4, W/CONF, URINE    Hypertension, unspecified type  -     olmesartan-hydrochlorothiazide (BENICAR HCT) 40-25 mg per tablet; Take 1 tablet by mouth once daily.  Dispense: 90 tablet; Refill: 3    Attention deficit hyperactivity disorder (ADHD), unspecified ADHD type  Comments:  Doing MUCH better on current dosage regemin. will refill as needed.  Orders:  -     DRUG MONITOR, PANEL 4, W/CONF, URINE  -     dextroamphetamine-amphetamine (ADDERALL XR) 30 MG 24 hr capsule; Take 1 capsule (30 mg total) by mouth every morning.  Dispense: 30 capsule; Refill: 0  -     dextroamphetamine-amphetamine (ADDERALL) 10 mg Tab; Take 1 tablet (10 mg total) by mouth once daily.  Dispense: 30 tablet; Refill: 0  -     dextroamphetamine-amphetamine (ADDERALL XR) 30 MG 24 hr capsule; Take 1 capsule (30 mg total) by mouth every morning.  Dispense: 30 capsule; Refill: 0  -     dextroamphetamine-amphetamine (ADDERALL XR) 30 MG 24 hr capsule; Take 1 capsule (30 mg total) by mouth every morning.  Dispense: 30 capsule; Refill: 0  -     dextroamphetamine-amphetamine (ADDERALL) 10 mg Tab; Take 1 tablet (10 mg total) by mouth once daily.  Dispense: 30 tablet; Refill: 0  -     dextroamphetamine-amphetamine (ADDERALL) 10 mg Tab; Take 1 tablet (10 mg total) by mouth once daily.  Dispense: 30 tablet; Refill: 0      Follow up in about 6 months (around 6/30/2025).    This note was  generated with the assistance of ambient listening technology. Verbal consent was obtained by the patient and accompanying visitor(s) for the recording of patient appointment to facilitate this note. I attest to having reviewed and edited the generated note for accuracy, though some syntax or spelling errors may persist. Please contact the author of this note for any clarification.          12/31/2024 Johnathan Gill PA-C

## 2025-01-30 ENCOUNTER — E-VISIT (OUTPATIENT)
Dept: FAMILY MEDICINE | Facility: CLINIC | Age: 40
End: 2025-01-30
Payer: COMMERCIAL

## 2025-01-30 ENCOUNTER — TELEPHONE (OUTPATIENT)
Dept: FAMILY MEDICINE | Facility: CLINIC | Age: 40
End: 2025-01-30

## 2025-01-30 DIAGNOSIS — N52.9 ERECTILE DYSFUNCTION, UNSPECIFIED ERECTILE DYSFUNCTION TYPE: Primary | ICD-10-CM

## 2025-01-30 RX ORDER — SILDENAFIL 100 MG/1
100 TABLET, FILM COATED ORAL DAILY PRN
Qty: 12 TABLET | Refills: 2 | Status: SHIPPED | OUTPATIENT
Start: 2025-01-30 | End: 2025-04-30

## 2025-01-30 NOTE — PROGRESS NOTES
Patient ID: Owen Gonzalez is a 39 y.o. male.    Chief Complaint: General Illness (Entered automatically based on patient selection in [employer].)    The patient initiated a request through [employer] on 1/30/2025 for evaluation and management with a chief complaint of General Illness (Entered automatically based on patient selection in [employer].)     I evaluated the questionnaire submission on 01/30/25.    [facility]    1/30/2025  7:20 AM CST - Filed by Patient   What do you need help with? Erectile Dysfunction   Do you agree to participate in an E-Visit? Yes   If you have any of the symptoms below, please do not complete an E-Visit. Instead, go to your local emergency room: I acknowledge   I would like to address: Other concern related to Sexual Dysfunction   Have you ever had an erection? Yes   Have you ever been able to keep an erection through to ejaculation? Yes   In the past month, have you woken up with an erection? No   Are you able to keep an erection for some sexual activities, but not others? No   Do you have any of the following symptoms? Unable to keep erection;  Unable to get erection   Before your sexual symptoms started, did you have any of the following? New medication   Please describe your symptoms I no longer get a morning erection and if i do its not a full erection. Also during sexual activity it no longer remains erect for long.  The ejaculation of the semen is not strong as it once was   How severe are your symptoms? Severe   Have you had these symptoms before? No   How long have you been having these symptoms? For more than a month   Please list any medications or treatments you have used for your condition and indicate if it was effective or not. Na   What makes this feel better? Na   What makes this feel worse? Na   Are these symptoms related to a condition that you currently have? I am not sure   When were you last seen for this condition?    Please describe  any probable cause for these symptoms Na   Provide any additional information you feel is important.    Please attach any relevant images or files    Are you able to take your vital signs? Yes   Systolic Blood Pressure: 118   Diastolic Blood Pressure: 76   Weight: 220   Height: 69   Pulse:    Temperature:    Respiration rate:    Pulse Oxygen:          Encounter Diagnosis   Name Primary?    Erectile dysfunction, unspecified erectile dysfunction type Yes        Orders Placed This Encounter   Procedures    Testosterone, Total, LC/MS/MS     Standing Status:   Future     Number of Occurrences:   1     Standing Expiration Date:   2026     Order Specific Question:   Send normal result to authorizing provider's In Basket if patient is active on MyChart:     Answer:   Yes      Medications Ordered This Encounter   Medications    sildenafiL (VIAGRA) 100 MG tablet     Sig: Take 1 tablet (100 mg total) by mouth daily as needed for Erectile Dysfunction.     Dispense:  12 tablet     Refill:  2        No follow-ups on file.      E-Visit Time Trackinmin

## 2025-02-14 DIAGNOSIS — F90.9 ATTENTION DEFICIT HYPERACTIVITY DISORDER (ADHD), UNSPECIFIED ADHD TYPE: ICD-10-CM

## 2025-02-14 RX ORDER — DEXTROAMPHETAMINE SACCHARATE, AMPHETAMINE ASPARTATE, DEXTROAMPHETAMINE SULFATE AND AMPHETAMINE SULFATE 2.5; 2.5; 2.5; 2.5 MG/1; MG/1; MG/1; MG/1
10 TABLET ORAL DAILY
Qty: 30 TABLET | Refills: 0 | Status: SHIPPED | OUTPATIENT
Start: 2025-02-14 | End: 2025-03-16

## 2025-02-14 RX ORDER — DEXTROAMPHETAMINE SACCHARATE, AMPHETAMINE ASPARTATE MONOHYDRATE, DEXTROAMPHETAMINE SULFATE AND AMPHETAMINE SULFATE 7.5; 7.5; 7.5; 7.5 MG/1; MG/1; MG/1; MG/1
30 CAPSULE, EXTENDED RELEASE ORAL EVERY MORNING
Qty: 30 CAPSULE | Refills: 0 | Status: SHIPPED | OUTPATIENT
Start: 2025-02-14 | End: 2025-03-16

## 2025-02-21 ENCOUNTER — RESULTS FOLLOW-UP (OUTPATIENT)
Dept: FAMILY MEDICINE | Facility: CLINIC | Age: 40
End: 2025-02-21

## 2025-02-25 ENCOUNTER — RESULTS FOLLOW-UP (OUTPATIENT)
Dept: FAMILY MEDICINE | Facility: CLINIC | Age: 40
End: 2025-02-25

## 2025-05-02 DIAGNOSIS — F90.9 ATTENTION DEFICIT HYPERACTIVITY DISORDER (ADHD), UNSPECIFIED ADHD TYPE: ICD-10-CM

## 2025-05-02 RX ORDER — DEXTROAMPHETAMINE SACCHARATE, AMPHETAMINE ASPARTATE MONOHYDRATE, DEXTROAMPHETAMINE SULFATE AND AMPHETAMINE SULFATE 7.5; 7.5; 7.5; 7.5 MG/1; MG/1; MG/1; MG/1
30 CAPSULE, EXTENDED RELEASE ORAL EVERY MORNING
Qty: 30 CAPSULE | Refills: 0 | Status: SHIPPED | OUTPATIENT
Start: 2025-05-02

## 2025-05-02 RX ORDER — DEXTROAMPHETAMINE SACCHARATE, AMPHETAMINE ASPARTATE, DEXTROAMPHETAMINE SULFATE AND AMPHETAMINE SULFATE 2.5; 2.5; 2.5; 2.5 MG/1; MG/1; MG/1; MG/1
10 TABLET ORAL DAILY
Qty: 30 TABLET | Refills: 0 | Status: SHIPPED | OUTPATIENT
Start: 2025-05-02 | End: 2025-06-01

## 2025-06-13 DIAGNOSIS — F90.9 ATTENTION DEFICIT HYPERACTIVITY DISORDER (ADHD), UNSPECIFIED ADHD TYPE: ICD-10-CM

## 2025-06-13 RX ORDER — DEXTROAMPHETAMINE SACCHARATE, AMPHETAMINE ASPARTATE, DEXTROAMPHETAMINE SULFATE AND AMPHETAMINE SULFATE 2.5; 2.5; 2.5; 2.5 MG/1; MG/1; MG/1; MG/1
10 TABLET ORAL DAILY
Qty: 30 TABLET | Refills: 0 | Status: SHIPPED | OUTPATIENT
Start: 2025-06-13 | End: 2025-07-13

## 2025-06-13 RX ORDER — DEXTROAMPHETAMINE SACCHARATE, AMPHETAMINE ASPARTATE MONOHYDRATE, DEXTROAMPHETAMINE SULFATE AND AMPHETAMINE SULFATE 7.5; 7.5; 7.5; 7.5 MG/1; MG/1; MG/1; MG/1
30 CAPSULE, EXTENDED RELEASE ORAL EVERY MORNING
Qty: 30 CAPSULE | Refills: 0 | Status: SHIPPED | OUTPATIENT
Start: 2025-06-13

## 2025-07-11 ENCOUNTER — OFFICE VISIT (OUTPATIENT)
Dept: FAMILY MEDICINE | Facility: CLINIC | Age: 40
End: 2025-07-11
Payer: COMMERCIAL

## 2025-07-11 VITALS
RESPIRATION RATE: 18 BRPM | HEART RATE: 100 BPM | WEIGHT: 229 LBS | HEIGHT: 69 IN | BODY MASS INDEX: 33.92 KG/M2 | OXYGEN SATURATION: 96 % | DIASTOLIC BLOOD PRESSURE: 60 MMHG | SYSTOLIC BLOOD PRESSURE: 108 MMHG

## 2025-07-11 DIAGNOSIS — K21.9 GASTROESOPHAGEAL REFLUX DISEASE, UNSPECIFIED WHETHER ESOPHAGITIS PRESENT: ICD-10-CM

## 2025-07-11 DIAGNOSIS — I10 HYPERTENSION, UNSPECIFIED TYPE: Primary | ICD-10-CM

## 2025-07-11 DIAGNOSIS — F90.9 ATTENTION DEFICIT HYPERACTIVITY DISORDER (ADHD), UNSPECIFIED ADHD TYPE: ICD-10-CM

## 2025-07-11 RX ORDER — DEXTROAMPHETAMINE SACCHARATE, AMPHETAMINE ASPARTATE MONOHYDRATE, DEXTROAMPHETAMINE SULFATE AND AMPHETAMINE SULFATE 7.5; 7.5; 7.5; 7.5 MG/1; MG/1; MG/1; MG/1
30 CAPSULE, EXTENDED RELEASE ORAL EVERY MORNING
Qty: 30 CAPSULE | Refills: 0 | Status: SHIPPED | OUTPATIENT
Start: 2025-07-11

## 2025-07-11 RX ORDER — DEXTROAMPHETAMINE SACCHARATE, AMPHETAMINE ASPARTATE, DEXTROAMPHETAMINE SULFATE AND AMPHETAMINE SULFATE 2.5; 2.5; 2.5; 2.5 MG/1; MG/1; MG/1; MG/1
10 TABLET ORAL DAILY
Qty: 30 TABLET | Refills: 0 | Status: SHIPPED | OUTPATIENT
Start: 2025-09-11 | End: 2025-10-11

## 2025-07-11 RX ORDER — DEXTROAMPHETAMINE SACCHARATE, AMPHETAMINE ASPARTATE MONOHYDRATE, DEXTROAMPHETAMINE SULFATE AND AMPHETAMINE SULFATE 7.5; 7.5; 7.5; 7.5 MG/1; MG/1; MG/1; MG/1
30 CAPSULE, EXTENDED RELEASE ORAL EVERY MORNING
Qty: 30 CAPSULE | Refills: 0 | Status: SHIPPED | OUTPATIENT
Start: 2025-08-11

## 2025-07-11 RX ORDER — DEXTROAMPHETAMINE SACCHARATE, AMPHETAMINE ASPARTATE, DEXTROAMPHETAMINE SULFATE AND AMPHETAMINE SULFATE 2.5; 2.5; 2.5; 2.5 MG/1; MG/1; MG/1; MG/1
10 TABLET ORAL DAILY
Qty: 30 TABLET | Refills: 0 | Status: SHIPPED | OUTPATIENT
Start: 2025-08-11 | End: 2025-09-10

## 2025-07-11 RX ORDER — DEXTROAMPHETAMINE SACCHARATE, AMPHETAMINE ASPARTATE, DEXTROAMPHETAMINE SULFATE AND AMPHETAMINE SULFATE 2.5; 2.5; 2.5; 2.5 MG/1; MG/1; MG/1; MG/1
10 TABLET ORAL DAILY
Qty: 30 TABLET | Refills: 0 | Status: SHIPPED | OUTPATIENT
Start: 2025-07-11 | End: 2025-08-10

## 2025-07-11 RX ORDER — DEXTROAMPHETAMINE SACCHARATE, AMPHETAMINE ASPARTATE MONOHYDRATE, DEXTROAMPHETAMINE SULFATE AND AMPHETAMINE SULFATE 7.5; 7.5; 7.5; 7.5 MG/1; MG/1; MG/1; MG/1
30 CAPSULE, EXTENDED RELEASE ORAL EVERY MORNING
Qty: 30 CAPSULE | Refills: 0 | Status: SHIPPED | OUTPATIENT
Start: 2025-09-11

## 2025-07-11 NOTE — PROGRESS NOTES
SUBJECTIVE:    Patient ID: Owen Gonzalez is a 39 y.o. male.    Chief Complaint: Follow-up (No bottles//refills needed// pt states he have no complaints today //last taken adderall was this am )    History of Present Illness    CHIEF COMPLAINT:  Owen presents today for follow up.    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 His current heart rate is estimated at 80-84 bpm. He experiences lightheaded spells associated with blood pressure medication, with dizziness triggered by dehydration, heat exposure, and positional changes. He reports one fall due to dizziness 1-2 months ago when getting out of bed, with a recent ER visit confirming dehydration. These episodes have become less frequent. He has been advised to track home blood pressure readings.    MEDICATIONS:  He takes Adderall 30mg in morning and 10mg in afternoon as needed, and blood pressure medication (olmesartan/hydrochlorothiazide combination). He denies any side effects including anxiety, palpitations, or appetite changes. Medication adequately covers daily needs without interfering with sleep.    LABS:  February labs showed normal testosterone (507), kidney function, and liver function. LDL cholesterol was slightly elevated at 127.      ROS:  General: -fever, -chills, -fatigue, -weight gain, -weight loss  Eyes: -vision changes, -redness, -discharge  ENT: -ear pain, -nasal congestion, -sore throat  Cardiovascular: -chest pain, -palpitations, -lower extremity edema, +feelings of fast heart rate  Respiratory: -cough, -shortness of breath  Gastrointestinal: -abdominal pain, -nausea, -vomiting, -diarrhea, -constipation, -blood in stool  Genitourinary: -dysuria, -hematuria, -frequency  Musculoskeletal: -joint pain, -muscle pain  Skin: -rash, -lesion  Neurological: -headache, +dizziness, -numbness, -tingling, +lightheadedness  Psychiatric:  -anxiety, -depression, -sleep difficulty         No visits with results within 6 Month(s) from this visit.   Latest known visit with results is:   Office Visit on 12/31/2024   Component Date Value Ref Range Status    Amphetamines 02/17/2025 POSITIVE (A)  <500 ng/mL Final    Amphetamine 02/17/2025 1,153 (H)  <250 ng/mL Final    Methamphetamine 02/17/2025 NEGATIVE  <250 ng/mL Final    Amphetamines Comments 02/17/2025 SEE COMMENT   Final    Barbiturates 02/17/2025 NEGATIVE  <300 ng/mL Final    Benzodiazepines 02/17/2025 NEGATIVE  <100 ng/mL Final    Cocaine Metabolites 02/17/2025 NEGATIVE  <150 ng/mL Final    Methadone 02/17/2025 NEGATIVE  <100 ng/mL Final    Opiates 02/17/2025 NEGATIVE  <100 ng/mL Final    Oxycodone 02/17/2025 NEGATIVE  <100 ng/mL Final    Phencyclidine 02/17/2025 NEGATIVE  <25 ng/mL Final    Creatinine 02/17/2025 281.4  > or = 20.0 mg/dL Final    pH 02/17/2025 6.0  4.5 - 9.0 Final    Oxidants, Urine (Tox) 02/17/2025 NEGATIVE  <200 mcg/mL Final    Notes and Comments 02/17/2025 SEE COMMENT   Final       Past Medical History:   Diagnosis Date    ADHD      Past Surgical History:   Procedure Laterality Date    ANKLE FRACTURE SURGERY Left 2008    left ankle surgery 2008      OPEN REDUCTION AND INTERNAL FIXATION (ORIF) OF FRACTURE OF CLAVICLE Right 8/18/2020    Procedure: ORIF, FRACTURE, CLAVICLE;  Surgeon: Murtaza Adkins Jr., MD;  Location: [facility];  Service: Orthopedics;  Laterality: Right;  Accumed     Family History   Problem Relation Name Age of Onset    Diabetes Maternal Grandfather         Marital Status:   Alcohol History:  reports current alcohol use.  Tobacco History:  reports that he has quit smoking. He has been exposed to tobacco smoke. He quit smokeless tobacco use about 9 years ago.  Drug History:  reports no history of drug use.    Review of patient's allergies indicates:  No Known Allergies  Current Medications[1]    Objective:      Vitals:    07/11/25 0737   BP: 108/60   Pulse: 100  "  Resp: 18   SpO2: 96%   Weight: 103.9 kg (229 lb)   Height: 5' 9" (1.753 m)     Physical Exam    Vitals: Blood pressure: 108/60. Initial heart rate: 100. Current heart rate: 80-84.  General: No acute distress. Well-developed. Well-nourished.  Eyes: EOMI. Sclerae anicteric.  HENT: Normocephalic. Atraumatic. Nares patent. Moist oral mucosa.  Ears: Bilateral TMs clear. Bilateral EACs clear.  Cardiovascular: Regular rate. Regular rhythm. No murmurs. No rubs. No gallops. Normal S1, S2.  Respiratory: Normal respiratory effort. Clear to auscultation bilaterally. No rales. No rhonchi. No wheezing.  Abdomen: Soft. Non-tender. Non-distended. Normoactive bowel sounds.  Musculoskeletal: No  obvious deformity.  Extremities: No lower extremity edema.  Neurological: Alert & oriented x3. No slurred speech. Normal gait.  Psychiatric: Normal mood. Normal affect. Good insight. Good judgment.  Skin: Warm. Dry. No rash.         Assessment:       Assessment & Plan    - Maintained current BP regimen, will monitor closely.  - Evaluated heart rate concerns, considering potential need for Holter monitor if episodes persist.  - Noted slightly elevated LDL cholesterol (127), planning to recheck at next visit.  - Reviewed annual screening protocols based on age and risk factors.    ## ORTHOSTATIC HYPOTENSION:  - Monitored patient's lightheaded spells, especially when transitioning from squatting to standing.  - Noted an episode of dizziness upon getting out of bed that resulted in a fall.  - Blood pressure today is 108/60, which is on the lower side.  - Explained importance of hydration in managing blood pressure and preventing lightheadedness.  - Blood pressure is currently well-managed with medication but sometimes drops too low, causing dizziness.    ## HYPERTENSION MANAGEMENT:  - Planned to remove hydrochlorothiazide component from medication and continue with Olmesartan alone.  - Current blood pressure reading is 108/60.  - Instructed " patient to monitor blood pressure at home and report readings via patient portal in 2 weeks.  - Will reassess medication adjustment if necessary based on these readings.    ## PALPITATIONS / ELEVATED HEART RATE:  - Owen reports high heart rate episodes, sometimes reaching 180 bpm without exercise.  - Current evaluation shows normal sinus rhythm estimated at 80-84 bpm.  - Explained normal heart rate range ( bpm) and when elevated rates are concerning.  - Discussed potential causes including caffeine intake and energy drinks.  - Instructed patient to keep a log of heart rate episodes, including activity level and circumstances when they occur.  - Advised to run ECG on Apple Watch during episodes of elevated heart rate, if possible.  - Will consider Holter monitor if episodes persist.    ## HYPERLIPIDEMIA:  - Cholesterol levels are slightly elevated with LDL at 127.  - Planned for labs, including cholesterol recheck, at next visit (likely early next year).  - Will reassess treatment options after re-evaluation of cholesterol levels.    ## ATTENTION-DEFICIT HYPERACTIVITY DISORDER:  - Owen currently takes Adderall 30 mg in the morning and 10 mg in afternoon as needed.  - Reports no issues with sleep, appetite, anxiety, or palpitations while on medication.  - Assessed medication usage and determined to continue current regimen.  - Refilled Adderall prescription for the next 3 months.    ## DEHYDRATION:  - Owen reports dehydration episodes, especially when outside or in heat, with previous diagnosis during an ER visit.  - Recommend ensuring adequate hydration, especially when working outside or in hot conditions, to prevent dizziness episodes.    ## FALL PREVENTION:  - Owen experienced a fall 1-2 months ago after getting out of bed due to dizziness.  - No similar episodes have occurred since.  - Will continue monitoring for further episodes and emphasized maintaining hydration to prevent falls.    ##  FOLLOW-UP:  - Scheduled follow up in 2 weeks via [employer] message with home blood pressure readings and in approximately 6 months for in-person evaluation.       Plan:       Hypertension, unspecified type    Attention deficit hyperactivity disorder (ADHD), unspecified ADHD type  Comments:  Doing MUCH better on current dosage regemin. will refill as needed.  Orders:  -     DRUG MONITOR, PANEL 4, W/CONF, URINE; Future; Expected date: 07/11/2025  -     dextroamphetamine-amphetamine (ADDERALL XR) 30 MG 24 hr capsule; Take 1 capsule (30 mg total) by mouth every morning.  Dispense: 30 capsule; Refill: 0  -     dextroamphetamine-amphetamine (ADDERALL) 10 mg Tab; Take 1 tablet (10 mg total) by mouth once daily.  Dispense: 30 tablet; Refill: 0  -     dextroamphetamine-amphetamine (ADDERALL XR) 30 MG 24 hr capsule; Take 1 capsule (30 mg total) by mouth every morning.  Dispense: 30 capsule; Refill: 0  -     dextroamphetamine-amphetamine (ADDERALL XR) 30 MG 24 hr capsule; Take 1 capsule (30 mg total) by mouth every morning.  Dispense: 30 capsule; Refill: 0  -     dextroamphetamine-amphetamine (ADDERALL) 10 mg Tab; Take 1 tablet (10 mg total) by mouth once daily.  Dispense: 30 tablet; Refill: 0  -     dextroamphetamine-amphetamine (ADDERALL) 10 mg Tab; Take 1 tablet (10 mg total) by mouth once daily.  Dispense: 30 tablet; Refill: 0    Gastroesophageal reflux disease, unspecified whether esophagitis present      No follow-ups on file.    This note was generated with the assistance of ambient listening technology. Verbal consent was obtained by the patient and accompanying visitor(s) for the recording of patient appointment to facilitate this note. I attest to having reviewed and edited the generated note for accuracy, though some syntax or spelling errors may persist. Please contact the author of this note for any clarification.          7/11/2025 Johnathan Gill PA-C           [1]   Current Outpatient Medications:      hydroCHLOROthiazide (HYDRODIURIL) 25 MG tablet, Take 1 tablet (25 mg total) by mouth once daily., Disp: 30 tablet, Rfl: 2    olmesartan-hydrochlorothiazide (BENICAR HCT) 40-25 mg per tablet, Take 1 tablet by mouth once daily., Disp: 90 tablet, Rfl: 3    pantoprazole (PROTONIX) 40 MG tablet, Take 1 tablet (40 mg total) by mouth once daily., Disp: 30 tablet, Rfl: 5    sildenafiL (VIAGRA) 100 MG tablet, Take 1 tablet (100 mg total) by mouth daily as needed for Erectile Dysfunction., Disp: 12 tablet, Rfl: 2    dextroamphetamine-amphetamine (ADDERALL XR) 30 MG 24 hr capsule, Take 1 capsule (30 mg total) by mouth every morning., Disp: 30 capsule, Rfl: 0    [START ON 8/11/2025] dextroamphetamine-amphetamine (ADDERALL XR) 30 MG 24 hr capsule, Take 1 capsule (30 mg total) by mouth every morning., Disp: 30 capsule, Rfl: 0    [START ON 9/11/2025] dextroamphetamine-amphetamine (ADDERALL XR) 30 MG 24 hr capsule, Take 1 capsule (30 mg total) by mouth every morning., Disp: 30 capsule, Rfl: 0    dextroamphetamine-amphetamine (ADDERALL) 10 mg Tab, Take 1 tablet (10 mg total) by mouth once daily., Disp: 30 tablet, Rfl: 0    [START ON 8/11/2025] dextroamphetamine-amphetamine (ADDERALL) 10 mg Tab, Take 1 tablet (10 mg total) by mouth once daily., Disp: 30 tablet, Rfl: 0    [START ON 9/11/2025] dextroamphetamine-amphetamine (ADDERALL) 10 mg Tab, Take 1 tablet (10 mg total) by mouth once daily., Disp: 30 tablet, Rfl: 0

## 2025-08-21 DIAGNOSIS — K21.9 GASTROESOPHAGEAL REFLUX DISEASE, UNSPECIFIED WHETHER ESOPHAGITIS PRESENT: ICD-10-CM

## 2025-08-21 DIAGNOSIS — I10 HYPERTENSION, UNSPECIFIED TYPE: ICD-10-CM

## 2025-08-21 DIAGNOSIS — F90.9 ATTENTION DEFICIT HYPERACTIVITY DISORDER (ADHD), UNSPECIFIED ADHD TYPE: ICD-10-CM

## 2025-08-21 RX ORDER — DEXTROAMPHETAMINE SACCHARATE, AMPHETAMINE ASPARTATE MONOHYDRATE, DEXTROAMPHETAMINE SULFATE AND AMPHETAMINE SULFATE 7.5; 7.5; 7.5; 7.5 MG/1; MG/1; MG/1; MG/1
30 CAPSULE, EXTENDED RELEASE ORAL EVERY MORNING
Qty: 30 CAPSULE | Refills: 0 | Status: CANCELLED | OUTPATIENT
Start: 2025-08-21

## 2025-08-21 RX ORDER — PANTOPRAZOLE SODIUM 40 MG/1
40 TABLET, DELAYED RELEASE ORAL DAILY
Qty: 30 TABLET | Refills: 5 | Status: SHIPPED | OUTPATIENT
Start: 2025-08-21 | End: 2026-02-17

## 2025-08-21 RX ORDER — OLMESARTAN MEDOXOMIL AND HYDROCHLOROTHIAZIDE 40/25 40; 25 MG/1; MG/1
1 TABLET ORAL DAILY
Qty: 90 TABLET | Refills: 3 | Status: SHIPPED | OUTPATIENT
Start: 2025-08-21 | End: 2026-08-21

## 2025-08-21 RX ORDER — DEXTROAMPHETAMINE SACCHARATE, AMPHETAMINE ASPARTATE, DEXTROAMPHETAMINE SULFATE AND AMPHETAMINE SULFATE 2.5; 2.5; 2.5; 2.5 MG/1; MG/1; MG/1; MG/1
10 TABLET ORAL DAILY
Qty: 30 TABLET | Refills: 0 | Status: CANCELLED | OUTPATIENT
Start: 2025-08-21 | End: 2025-09-20

## (undated) DEVICE — SUT CTD VICRYL 2.0

## (undated) DEVICE — SEE MEDLINE ITEM 157131

## (undated) DEVICE — ALCOHOL 70% ISOP RUBBING 4OZ

## (undated) DEVICE — STRAP OR TABLE 5IN X 72IN

## (undated) DEVICE — SCREW BONE NONLOCK HEX 3.5X10
Type: IMPLANTABLE DEVICE | Site: CLAVICLE | Status: NON-FUNCTIONAL
Removed: 2020-08-18

## (undated) DEVICE — SUT VCRL 2-0 GYN 8-18IN MO4

## (undated) DEVICE — TRAY DRY SPONGE SCRUB W FOAM

## (undated) DEVICE — IMPLANTABLE DEVICE
Type: IMPLANTABLE DEVICE | Site: CLAVICLE | Status: NON-FUNCTIONAL
Removed: 2020-08-18

## (undated) DEVICE — DRILL QUICK RELEASE 2.8MM 5IN

## (undated) DEVICE — SEE MEDLINE ITEM 152622

## (undated) DEVICE — SUT ETHILON 4-0 PS2 18 BLK

## (undated) DEVICE — CLIPPER BLADE MOD 4406 (CAREF)

## (undated) DEVICE — SLING ORTHOPEDIC LARGE

## (undated) DEVICE — SPONGE SUPER KERLIX 6X6.75IN

## (undated) DEVICE — BRUSH SCRUB HIBICLENS 4%

## (undated) DEVICE — DRESSING N ADH OIL EMUL 3X3

## (undated) DEVICE — GLOVE SURG ULTRA TOUCH 8

## (undated) DEVICE — SCRUB HIBICLENS 4% CHG 4OZ

## (undated) DEVICE — DRAPE THYROID WITH ARMBOARD

## (undated) DEVICE — PACK BASIC

## (undated) DEVICE — PACK CUSTOM UNIV BASIN SLI

## (undated) DEVICE — BIT DRILL QUICK RELEASE 2.8MM

## (undated) DEVICE — ELECTRODE REM PLYHSV RETURN 9